# Patient Record
Sex: MALE | Race: ASIAN | NOT HISPANIC OR LATINO | Employment: FULL TIME | URBAN - METROPOLITAN AREA
[De-identification: names, ages, dates, MRNs, and addresses within clinical notes are randomized per-mention and may not be internally consistent; named-entity substitution may affect disease eponyms.]

---

## 2019-02-13 ENCOUNTER — OFFICE VISIT - HEALTHEAST (OUTPATIENT)
Dept: FAMILY MEDICINE | Facility: CLINIC | Age: 44
End: 2019-02-13

## 2019-02-13 ENCOUNTER — COMMUNICATION - HEALTHEAST (OUTPATIENT)
Dept: FAMILY MEDICINE | Facility: CLINIC | Age: 44
End: 2019-02-13

## 2019-02-13 DIAGNOSIS — R03.0 BORDERLINE HIGH BLOOD PRESSURE: ICD-10-CM

## 2019-02-13 DIAGNOSIS — M54.6 ACUTE MIDLINE THORACIC BACK PAIN: ICD-10-CM

## 2019-02-13 ASSESSMENT — MIFFLIN-ST. JEOR: SCORE: 1853

## 2019-02-20 ENCOUNTER — HOSPITAL ENCOUNTER (OUTPATIENT)
Dept: PHYSICAL MEDICINE AND REHAB | Facility: CLINIC | Age: 44
Discharge: HOME | End: 2019-02-20
Attending: FAMILY MEDICINE

## 2019-02-20 DIAGNOSIS — R20.2 PARESTHESIA: ICD-10-CM

## 2019-02-20 DIAGNOSIS — M54.41 ACUTE BILATERAL LOW BACK PAIN WITH BILATERAL SCIATICA: ICD-10-CM

## 2019-02-20 DIAGNOSIS — M54.42 ACUTE BILATERAL LOW BACK PAIN WITH BILATERAL SCIATICA: ICD-10-CM

## 2019-02-22 ENCOUNTER — RECORDS - HEALTHEAST (OUTPATIENT)
Dept: ADMINISTRATIVE | Facility: OTHER | Age: 44
End: 2019-02-22

## 2019-02-22 ENCOUNTER — TRANSFERRED RECORDS (OUTPATIENT)
Dept: HEALTH INFORMATION MANAGEMENT | Facility: CLINIC | Age: 44
End: 2019-02-22

## 2019-02-27 ENCOUNTER — RECORDS - HEALTHEAST (OUTPATIENT)
Dept: ADMINISTRATIVE | Facility: OTHER | Age: 44
End: 2019-02-27

## 2019-02-28 ENCOUNTER — COMMUNICATION - HEALTHEAST (OUTPATIENT)
Dept: ADMINISTRATIVE | Facility: CLINIC | Age: 44
End: 2019-02-28

## 2019-03-05 ENCOUNTER — HOSPITAL ENCOUNTER (OUTPATIENT)
Dept: PHYSICAL MEDICINE AND REHAB | Facility: CLINIC | Age: 44
Discharge: HOME | End: 2019-03-05
Attending: PHYSICIAN ASSISTANT

## 2019-03-05 DIAGNOSIS — M54.42 ACUTE BILATERAL LOW BACK PAIN WITH BILATERAL SCIATICA: ICD-10-CM

## 2019-03-05 DIAGNOSIS — M54.41 ACUTE BILATERAL LOW BACK PAIN WITH BILATERAL SCIATICA: ICD-10-CM

## 2019-03-06 ENCOUNTER — COMMUNICATION - HEALTHEAST (OUTPATIENT)
Dept: PHYSICAL MEDICINE AND REHAB | Facility: CLINIC | Age: 44
End: 2019-03-06

## 2019-03-08 ENCOUNTER — COMMUNICATION - HEALTHEAST (OUTPATIENT)
Dept: PHYSICAL MEDICINE AND REHAB | Facility: CLINIC | Age: 44
End: 2019-03-08

## 2019-03-08 DIAGNOSIS — M54.50 LUMBAR PAIN: ICD-10-CM

## 2019-03-11 ENCOUNTER — HOSPITAL ENCOUNTER (OUTPATIENT)
Dept: PHYSICAL MEDICINE AND REHAB | Facility: CLINIC | Age: 44
Discharge: HOME | End: 2019-03-11
Attending: NURSE PRACTITIONER

## 2019-03-11 DIAGNOSIS — R20.2 PARESTHESIA: ICD-10-CM

## 2019-03-11 DIAGNOSIS — M54.42 ACUTE BILATERAL LOW BACK PAIN WITH BILATERAL SCIATICA: ICD-10-CM

## 2019-03-11 DIAGNOSIS — M54.50 LUMBAR PAIN: ICD-10-CM

## 2019-03-11 DIAGNOSIS — M79.18 MYOFASCIAL PAIN ON RIGHT SIDE: ICD-10-CM

## 2019-03-11 DIAGNOSIS — M54.41 ACUTE BILATERAL LOW BACK PAIN WITH BILATERAL SCIATICA: ICD-10-CM

## 2019-03-26 ENCOUNTER — HOSPITAL ENCOUNTER (OUTPATIENT)
Dept: PHYSICAL MEDICINE AND REHAB | Facility: CLINIC | Age: 44
Discharge: HOME | End: 2019-03-26
Attending: PHYSICIAN ASSISTANT

## 2019-03-26 DIAGNOSIS — M54.16 LUMBAR RADICULAR PAIN: ICD-10-CM

## 2019-03-27 ENCOUNTER — RECORDS - HEALTHEAST (OUTPATIENT)
Dept: RADIOLOGY | Facility: CLINIC | Age: 44
End: 2019-03-27

## 2019-04-09 ENCOUNTER — HOSPITAL ENCOUNTER (OUTPATIENT)
Dept: PHYSICAL MEDICINE AND REHAB | Facility: CLINIC | Age: 44
Discharge: HOME | End: 2019-04-09
Attending: PHYSICIAN ASSISTANT

## 2019-04-09 DIAGNOSIS — M54.16 LUMBAR RADICULAR PAIN: ICD-10-CM

## 2019-04-24 ENCOUNTER — HOSPITAL ENCOUNTER (OUTPATIENT)
Dept: PHYSICAL MEDICINE AND REHAB | Facility: CLINIC | Age: 44
Discharge: HOME | End: 2019-04-24
Attending: PHYSICIAN ASSISTANT

## 2019-04-24 DIAGNOSIS — M54.41 ACUTE BILATERAL LOW BACK PAIN WITH BILATERAL SCIATICA: ICD-10-CM

## 2019-04-24 DIAGNOSIS — M54.16 LUMBAR RADICULAR PAIN: ICD-10-CM

## 2019-04-24 DIAGNOSIS — R20.2 PARESTHESIA: ICD-10-CM

## 2019-04-24 DIAGNOSIS — M51.26 LUMBAR DISC HERNIATION: ICD-10-CM

## 2019-04-24 DIAGNOSIS — M54.2 CERVICALGIA: ICD-10-CM

## 2019-04-24 DIAGNOSIS — M54.42 ACUTE BILATERAL LOW BACK PAIN WITH BILATERAL SCIATICA: ICD-10-CM

## 2019-04-30 ENCOUNTER — HOSPITAL ENCOUNTER (OUTPATIENT)
Dept: PHYSICAL MEDICINE AND REHAB | Facility: CLINIC | Age: 44
Discharge: HOME | End: 2019-04-30
Attending: PHYSICIAN ASSISTANT

## 2019-04-30 DIAGNOSIS — M54.2 CERVICALGIA: ICD-10-CM

## 2019-04-30 DIAGNOSIS — M51.26 LUMBAR DISC HERNIATION: ICD-10-CM

## 2019-04-30 DIAGNOSIS — M53.3 CHRONIC RIGHT SACROILIAC JOINT PAIN: ICD-10-CM

## 2019-04-30 DIAGNOSIS — M54.16 LUMBAR RADICULAR PAIN: ICD-10-CM

## 2019-04-30 DIAGNOSIS — G89.29 CHRONIC RIGHT SACROILIAC JOINT PAIN: ICD-10-CM

## 2019-05-22 ENCOUNTER — AMBULATORY - HEALTHEAST (OUTPATIENT)
Dept: PHYSICAL MEDICINE AND REHAB | Facility: CLINIC | Age: 44
End: 2019-05-22

## 2019-05-22 ENCOUNTER — HOSPITAL ENCOUNTER (OUTPATIENT)
Dept: PHYSICAL MEDICINE AND REHAB | Facility: CLINIC | Age: 44
Discharge: HOME | End: 2019-05-22
Attending: PHYSICIAN ASSISTANT

## 2019-05-22 DIAGNOSIS — G89.29 CHRONIC RIGHT SACROILIAC JOINT PAIN: ICD-10-CM

## 2019-05-22 DIAGNOSIS — M53.3 CHRONIC RIGHT SACROILIAC JOINT PAIN: ICD-10-CM

## 2019-05-22 DIAGNOSIS — M54.16 LUMBAR RADICULAR PAIN: ICD-10-CM

## 2019-05-24 ENCOUNTER — RECORDS - HEALTHEAST (OUTPATIENT)
Dept: ADMINISTRATIVE | Facility: OTHER | Age: 44
End: 2019-05-24

## 2019-05-29 ENCOUNTER — RECORDS - HEALTHEAST (OUTPATIENT)
Dept: ADMINISTRATIVE | Facility: OTHER | Age: 44
End: 2019-05-29

## 2019-06-03 ENCOUNTER — HOSPITAL ENCOUNTER (OUTPATIENT)
Dept: PHYSICAL MEDICINE AND REHAB | Facility: CLINIC | Age: 44
Discharge: HOME | End: 2019-06-03
Attending: PHYSICIAN ASSISTANT

## 2019-06-03 DIAGNOSIS — M51.26 LUMBAR DISC HERNIATION: ICD-10-CM

## 2019-06-03 DIAGNOSIS — M54.16 LUMBAR RADICULAR PAIN: ICD-10-CM

## 2019-06-03 DIAGNOSIS — M53.3 PAIN OF RIGHT SACROILIAC JOINT: ICD-10-CM

## 2019-06-03 DIAGNOSIS — M25.551 HIP PAIN, RIGHT: ICD-10-CM

## 2019-06-04 ENCOUNTER — COMMUNICATION - HEALTHEAST (OUTPATIENT)
Dept: PHYSICAL MEDICINE AND REHAB | Facility: CLINIC | Age: 44
End: 2019-06-04

## 2019-06-07 ENCOUNTER — RECORDS - HEALTHEAST (OUTPATIENT)
Dept: ADMINISTRATIVE | Facility: OTHER | Age: 44
End: 2019-06-07

## 2019-06-10 ENCOUNTER — COMMUNICATION - HEALTHEAST (OUTPATIENT)
Dept: PHYSICAL MEDICINE AND REHAB | Facility: CLINIC | Age: 44
End: 2019-06-10

## 2019-06-10 ENCOUNTER — RECORDS - HEALTHEAST (OUTPATIENT)
Dept: ADMINISTRATIVE | Facility: OTHER | Age: 44
End: 2019-06-10

## 2019-06-10 ENCOUNTER — AMBULATORY - HEALTHEAST (OUTPATIENT)
Dept: PHYSICAL MEDICINE AND REHAB | Facility: CLINIC | Age: 44
End: 2019-06-10

## 2019-06-10 DIAGNOSIS — M53.3 SACROILIAC JOINT PAIN: ICD-10-CM

## 2019-06-12 ENCOUNTER — AMBULATORY - HEALTHEAST (OUTPATIENT)
Dept: LAB | Facility: HOSPITAL | Age: 44
End: 2019-06-12

## 2019-06-12 DIAGNOSIS — M53.3 SACROILIAC JOINT PAIN: ICD-10-CM

## 2019-06-12 LAB
C REACTIVE PROTEIN LHE: 0.1 MG/DL (ref 0–0.8)
ERYTHROCYTE [SEDIMENTATION RATE] IN BLOOD BY WESTERGREN METHOD: 7 MM/HR (ref 0–15)

## 2019-06-18 LAB
B LOCUS: NORMAL
B27TEST METHOD: NORMAL

## 2019-06-20 ENCOUNTER — COMMUNICATION - HEALTHEAST (OUTPATIENT)
Dept: PHYSICAL MEDICINE AND REHAB | Facility: CLINIC | Age: 44
End: 2019-06-20

## 2019-06-24 ENCOUNTER — HOSPITAL ENCOUNTER (OUTPATIENT)
Dept: PHYSICAL MEDICINE AND REHAB | Facility: CLINIC | Age: 44
Discharge: HOME | End: 2019-06-24
Attending: PHYSICIAN ASSISTANT

## 2019-06-24 DIAGNOSIS — M53.3 SACROILIAC JOINT PAIN: ICD-10-CM

## 2019-06-24 DIAGNOSIS — M47.816 LUMBAR FACET ARTHROPATHY: ICD-10-CM

## 2019-06-24 DIAGNOSIS — R20.2 PARESTHESIA: ICD-10-CM

## 2019-06-24 DIAGNOSIS — M54.16 LUMBAR RADICULAR PAIN: ICD-10-CM

## 2019-07-01 ENCOUNTER — COMMUNICATION - HEALTHEAST (OUTPATIENT)
Dept: PHYSICAL MEDICINE AND REHAB | Facility: CLINIC | Age: 44
End: 2019-07-01

## 2019-07-02 ENCOUNTER — COMMUNICATION - HEALTHEAST (OUTPATIENT)
Dept: PHYSICAL MEDICINE AND REHAB | Facility: CLINIC | Age: 44
End: 2019-07-02

## 2019-07-02 ENCOUNTER — HOSPITAL ENCOUNTER (OUTPATIENT)
Dept: PHYSICAL MEDICINE AND REHAB | Facility: CLINIC | Age: 44
Discharge: HOME | End: 2019-07-02
Attending: PHYSICIAN ASSISTANT

## 2019-07-02 DIAGNOSIS — M47.816 LUMBAR FACET ARTHROPATHY: ICD-10-CM

## 2019-07-02 DIAGNOSIS — M53.3 SACROILIAC JOINT PAIN: ICD-10-CM

## 2019-07-02 DIAGNOSIS — M54.16 LUMBAR RADICULAR PAIN: ICD-10-CM

## 2019-07-02 DIAGNOSIS — R32 URINARY INCONTINENCE, UNSPECIFIED TYPE: ICD-10-CM

## 2019-07-08 ENCOUNTER — COMMUNICATION - HEALTHEAST (OUTPATIENT)
Dept: PHYSICAL MEDICINE AND REHAB | Facility: CLINIC | Age: 44
End: 2019-07-08

## 2019-07-08 ENCOUNTER — HOSPITAL ENCOUNTER (OUTPATIENT)
Dept: PHYSICAL MEDICINE AND REHAB | Facility: CLINIC | Age: 44
Discharge: HOME | End: 2019-07-08
Attending: PHYSICIAN ASSISTANT

## 2019-07-08 DIAGNOSIS — M54.16 LUMBAR RADICULAR PAIN: ICD-10-CM

## 2019-07-08 DIAGNOSIS — R20.2 PARESTHESIA: ICD-10-CM

## 2019-07-10 ENCOUNTER — TRANSFERRED RECORDS (OUTPATIENT)
Dept: HEALTH INFORMATION MANAGEMENT | Facility: CLINIC | Age: 44
End: 2019-07-10

## 2019-07-10 ENCOUNTER — HOSPITAL ENCOUNTER (OUTPATIENT)
Dept: PHYSICAL MEDICINE AND REHAB | Facility: CLINIC | Age: 44
Discharge: HOME | End: 2019-07-10
Attending: PHYSICIAN ASSISTANT

## 2019-07-10 DIAGNOSIS — M47.816 LUMBAR FACET ARTHROPATHY: ICD-10-CM

## 2019-07-15 ENCOUNTER — COMMUNICATION - HEALTHEAST (OUTPATIENT)
Dept: PHYSICAL MEDICINE AND REHAB | Facility: CLINIC | Age: 44
End: 2019-07-15

## 2019-07-15 DIAGNOSIS — M47.816 LUMBAR SPONDYLOSIS: ICD-10-CM

## 2019-07-18 ENCOUNTER — COMMUNICATION - HEALTHEAST (OUTPATIENT)
Dept: PHYSICAL MEDICINE AND REHAB | Facility: CLINIC | Age: 44
End: 2019-07-18

## 2019-07-19 ENCOUNTER — OFFICE VISIT - HEALTHEAST (OUTPATIENT)
Dept: FAMILY MEDICINE | Facility: CLINIC | Age: 44
End: 2019-07-19

## 2019-07-19 DIAGNOSIS — M54.16 LUMBAR RADICULOPATHY: Primary | ICD-10-CM

## 2019-07-19 DIAGNOSIS — I10 BENIGN ESSENTIAL HYPERTENSION: ICD-10-CM

## 2019-07-19 DIAGNOSIS — E66.811 OBESITY (BMI 30.0-34.9): ICD-10-CM

## 2019-07-19 DIAGNOSIS — M54.16 LUMBAR RADICULAR PAIN: ICD-10-CM

## 2019-07-19 DIAGNOSIS — J20.9 ACUTE BRONCHITIS, UNSPECIFIED ORGANISM: ICD-10-CM

## 2019-07-19 ASSESSMENT — MIFFLIN-ST. JEOR: SCORE: 1864.3

## 2019-07-22 ENCOUNTER — HOSPITAL ENCOUNTER (OUTPATIENT)
Dept: PHYSICAL MEDICINE AND REHAB | Facility: CLINIC | Age: 44
Discharge: HOME | End: 2019-07-22
Attending: PHYSICIAN ASSISTANT

## 2019-07-22 DIAGNOSIS — M47.816 LUMBAR SPONDYLOSIS: ICD-10-CM

## 2019-07-22 DIAGNOSIS — M47.816 LUMBAR FACET ARTHROPATHY: ICD-10-CM

## 2019-07-22 DIAGNOSIS — M53.3 SACROILIAC JOINT PAIN: ICD-10-CM

## 2019-07-25 ENCOUNTER — ANCILLARY PROCEDURE (OUTPATIENT)
Dept: GENERAL RADIOLOGY | Facility: CLINIC | Age: 44
End: 2019-07-25
Attending: NEUROLOGICAL SURGERY
Payer: OTHER MISCELLANEOUS

## 2019-07-25 ENCOUNTER — OFFICE VISIT (OUTPATIENT)
Dept: NEUROSURGERY | Facility: CLINIC | Age: 44
End: 2019-07-25
Payer: OTHER MISCELLANEOUS

## 2019-07-25 ENCOUNTER — ANCILLARY PROCEDURE (OUTPATIENT)
Dept: GENERAL RADIOLOGY | Facility: CLINIC | Age: 44
End: 2019-07-25
Attending: NEUROLOGICAL SURGERY

## 2019-07-25 VITALS
WEIGHT: 225.7 LBS | HEART RATE: 83 BPM | BODY MASS INDEX: 35.43 KG/M2 | SYSTOLIC BLOOD PRESSURE: 144 MMHG | HEIGHT: 67 IN | OXYGEN SATURATION: 93 % | DIASTOLIC BLOOD PRESSURE: 93 MMHG

## 2019-07-25 DIAGNOSIS — M54.16 LUMBAR RADICULOPATHY: ICD-10-CM

## 2019-07-25 DIAGNOSIS — M53.3 SACROILIAC JOINT PAIN: Primary | ICD-10-CM

## 2019-07-25 RX ORDER — BENZONATATE 200 MG/1
CAPSULE ORAL
Refills: 1 | COMMUNITY
Start: 2019-07-19 | End: 2021-09-23

## 2019-07-25 RX ORDER — ALBUTEROL SULFATE 90 UG/1
2 AEROSOL, METERED RESPIRATORY (INHALATION)
COMMUNITY
Start: 2019-07-19 | End: 2021-09-23

## 2019-07-25 RX ORDER — CYCLOBENZAPRINE HCL 5 MG
5-10 TABLET ORAL
COMMUNITY
Start: 2019-03-08 | End: 2021-09-23

## 2019-07-25 RX ORDER — ACETAMINOPHEN 500 MG
1000 TABLET ORAL
COMMUNITY
Start: 2018-12-09 | End: 2024-07-24

## 2019-07-25 RX ORDER — ONDANSETRON 4 MG/1
4-8 TABLET, ORALLY DISINTEGRATING ORAL
COMMUNITY
Start: 2012-11-15 | End: 2021-09-23

## 2019-07-25 RX ORDER — GABAPENTIN 300 MG/1
1200 CAPSULE ORAL
COMMUNITY
Start: 2019-04-24 | End: 2021-09-23

## 2019-07-25 RX ORDER — HYDROCODONE BITARTRATE AND ACETAMINOPHEN 5; 325 MG/1; MG/1
1-2 TABLET ORAL
COMMUNITY
Start: 2012-11-15 | End: 2021-09-23

## 2019-07-25 ASSESSMENT — ENCOUNTER SYMPTOMS
MUSCLE WEAKNESS: 0
POLYDIPSIA: 1
NECK PAIN: 0
TREMORS: 0
MYALGIAS: 1
FEVER: 0
DYSPNEA ON EXERTION: 0
FATIGUE: 1
HYPOTENSION: 0
DIZZINESS: 0
SPEECH CHANGE: 0
JOINT SWELLING: 0
COUGH: 1
PALPITATIONS: 0
BACK PAIN: 1
WEAKNESS: 1
LIGHT-HEADEDNESS: 1
SINUS CONGESTION: 1
TASTE DISTURBANCE: 0
CHILLS: 0
PARALYSIS: 0
HEMOPTYSIS: 0
ARTHRALGIAS: 1
SEIZURES: 0
SMELL DISTURBANCE: 1
INCREASED ENERGY: 0
WEIGHT GAIN: 0
SHORTNESS OF BREATH: 1
SLEEP DISTURBANCES DUE TO BREATHING: 1
HEADACHES: 1
LOSS OF CONSCIOUSNESS: 0
DISTURBANCES IN COORDINATION: 0
TROUBLE SWALLOWING: 0
SORE THROAT: 0
ORTHOPNEA: 1
STIFFNESS: 0
WHEEZING: 1
HALLUCINATIONS: 0
TINGLING: 1
SNORES LOUDLY: 0
ALTERED TEMPERATURE REGULATION: 0
SPUTUM PRODUCTION: 0
HOARSE VOICE: 0
LEG PAIN: 1
HYPERTENSION: 1
NUMBNESS: 1
POSTURAL DYSPNEA: 1
MUSCLE CRAMPS: 0
DECREASED APPETITE: 1
NECK MASS: 0
EXERCISE INTOLERANCE: 0
NIGHT SWEATS: 0
MEMORY LOSS: 0
WEIGHT LOSS: 1
COUGH DISTURBING SLEEP: 1
POLYPHAGIA: 0
SYNCOPE: 0
SINUS PAIN: 0

## 2019-07-25 ASSESSMENT — PATIENT HEALTH QUESTIONNAIRE - PHQ9
SUM OF ALL RESPONSES TO PHQ QUESTIONS 1-9: 15
SUM OF ALL RESPONSES TO PHQ QUESTIONS 1-9: 15

## 2019-07-25 ASSESSMENT — MIFFLIN-ST. JEOR: SCORE: 1872.4

## 2019-07-25 ASSESSMENT — PAIN SCALES - GENERAL: PAINLEVEL: EXTREME PAIN (8)

## 2019-07-25 NOTE — LETTER
7/25/2019       RE: Dalila Malone  [address]  [place] [address]     Dear Colleague,    Thank you for referring your patient, Dalila Malone, to the [facility] NEUROSURGERY at [facility]. Please see a copy of my visit note below.        Neurosurgery Clinic Note    Patient was mistakenly scheduled for my clinic for sacroiliac joint pain evaluation    On exam, he has positive FFT, PSIS TTP, SRIKANTH, thigh thrust, Gaenslen's  Negative lumbar spine imaging  His EOS xrays are with him leaning on a cane, he has no significant sagittal deformity on angular measurements and no significant findings on lumbar MRI to explain pain  He had prior positive response to SI joint injection at [facility]    I will refer him to Dr. Kevan Cheek of [facility] to discuss further management of SI joint pain.  No charge for this visit.      Radha Valverde MD    [facility] Department of Neurosurgery  Office: [phone]    7/25/2019  1:04 PM            Again, thank you for allowing me to participate in the care of your patient.      Sincerely,    Radha Valverde MD

## 2019-07-25 NOTE — NURSING NOTE
Chief Complaint   Patient presents with     New Patient     UMP NEW RIGHT SACROILIAC JOINT       Kat Gómez, EMT

## 2019-07-25 NOTE — PROGRESS NOTES
Neurosurgery Clinic Note    Patient was mistakenly scheduled for my clinic for sacroiliac joint pain evaluation    On exam, he has positive FFT, PSIS TTP, SRIKANTH, thigh thrust, Gaenslen's  Negative lumbar spine imaging  His EOS xrays are with him leaning on a cane, he has no significant sagittal deformity on angular measurements and no significant findings on lumbar MRI to explain pain  He had prior positive response to SI joint injection at [facility]    I will refer him to Dr. Kevan Cheek of [facility] to discuss further management of SI joint pain.  No charge for this visit.      Radha Valverde MD    [facility] Department of Neurosurgery  Office: [phone]    7/25/2019  1:04 PM

## 2019-07-25 NOTE — LETTER
Date:July 29, 2019      Patient was self referred, no letter generated. Do not send.        [facility] Health Information

## 2019-07-26 ASSESSMENT — PATIENT HEALTH QUESTIONNAIRE - PHQ9: SUM OF ALL RESPONSES TO PHQ QUESTIONS 1-9: 15

## 2019-07-26 NOTE — TELEPHONE ENCOUNTER
"RECORDS RECEIVED FROM: appt layla Helm from Neurosurgery- for SI joint pain. referred by Radha Valverde MD   DATE RECEIVED: Aug 8, 2019    NOTES STATUS DETAILS   OFFICE NOTE from referring provider Internal 7/25/19 Dr. Valverde   OFFICE NOTE from other specialist Care Everywhere  Requested  7/22/19 Yana LEDEZMA    EMG   DISCHARGE SUMMARY from hospital N/A    DISCHARGE REPORT from the ER N/A    OPERATIVE REPORT N/A    MEDICATION LIST Care Everywhere    IMPLANT RECORD/STICKER N/A    LABS     CBC/DIFF N/A    CULTURES N/A    INJECTIONS DONE IN RADIOLOGY Received  7/10/19, 5/22/19, 4/9/19   MRI Received  2/22/19 [facility]   CT SCAN N/A    XRAYS (IMAGES & REPORTS) Received  7/25/19 internal    HE 2/12/19   TUMOR     PATHOLOGY  Slides & report N/A      07/26/19   3:22 PM  Faxed request to  for EMG  Called  film room for images.  3:31 PM called [facility] for MRI  Unclear where injection images are, will call  [facility], referring clinic, on Monday AM. [phone]    08/06/19   9:35 AM  Called spine center. Transferred to [facility]'s larger records department. They state they will put the injection images \"in the queue\" to be transferred to us, but sound doubtful.  9:41 AM called [facility] film room just in case. They \"don't see\" any images.   9:43 AM called spine center. Spoke with RN who can see the injection images in PACS. She will check with the radiology techs and with the referrals team to have the images pushed.   10:04 AM spine center RN called back and they have no way of getting the images to us.  Audeliad Mary for help with images  "

## 2019-07-29 ENCOUNTER — HOSPITAL ENCOUNTER (OUTPATIENT)
Dept: PHYSICAL MEDICINE AND REHAB | Facility: CLINIC | Age: 44
Discharge: HOME | End: 2019-07-29
Attending: PHYSICIAN ASSISTANT

## 2019-07-29 ENCOUNTER — COMMUNICATION - HEALTHEAST (OUTPATIENT)
Dept: PHYSICAL MEDICINE AND REHAB | Facility: CLINIC | Age: 44
End: 2019-07-29

## 2019-07-29 DIAGNOSIS — M54.42 ACUTE BILATERAL LOW BACK PAIN WITH BILATERAL SCIATICA: ICD-10-CM

## 2019-07-29 DIAGNOSIS — M47.816 LUMBAR FACET ARTHROPATHY: ICD-10-CM

## 2019-07-29 DIAGNOSIS — M79.18 MYOFASCIAL PAIN: ICD-10-CM

## 2019-07-29 DIAGNOSIS — M54.41 ACUTE BILATERAL LOW BACK PAIN WITH BILATERAL SCIATICA: ICD-10-CM

## 2019-07-29 DIAGNOSIS — M53.3 SACROILIAC JOINT PAIN: ICD-10-CM

## 2019-07-29 DIAGNOSIS — R20.2 PARESTHESIA: ICD-10-CM

## 2019-07-30 ENCOUNTER — HOSPITAL ENCOUNTER (OUTPATIENT)
Dept: PHYSICAL MEDICINE AND REHAB | Facility: CLINIC | Age: 44
Discharge: HOME | End: 2019-07-30
Attending: PHYSICIAN ASSISTANT

## 2019-07-30 ENCOUNTER — COMMUNICATION - HEALTHEAST (OUTPATIENT)
Dept: PHYSICAL MEDICINE AND REHAB | Facility: CLINIC | Age: 44
End: 2019-07-30

## 2019-07-30 ENCOUNTER — TRANSFERRED RECORDS (OUTPATIENT)
Dept: HEALTH INFORMATION MANAGEMENT | Facility: CLINIC | Age: 44
End: 2019-07-30

## 2019-07-30 DIAGNOSIS — M47.816 LUMBAR SPONDYLOSIS: ICD-10-CM

## 2019-07-31 ENCOUNTER — COMMUNICATION - HEALTHEAST (OUTPATIENT)
Dept: PHYSICAL MEDICINE AND REHAB | Facility: CLINIC | Age: 44
End: 2019-07-31
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
## 2019-07-31 DIAGNOSIS — M54.50 CHRONIC RIGHT-SIDED LOW BACK PAIN WITHOUT SCIATICA: ICD-10-CM

## 2019-07-31 DIAGNOSIS — G89.29 CHRONIC RIGHT-SIDED LOW BACK PAIN WITHOUT SCIATICA: ICD-10-CM

## 2019-08-07 NOTE — PROGRESS NOTES
REASON FOR CONSULTATION: Right sacral iliac joint pain     REFERRING PHYSICIAN: Radha Valverde     PCP:No primary care provider on file.    History of Present Illness:  44/m, accompanied by C, presents with right lower back pain and right buttocks pain radiating to the posterior right thigh. The patient also reports tingling in the right posterolateral thigh. This pain started on 2/11/2019 following a work injury. The patient was working in a [employer] picking up a dock plate chain when he twisted to the right. He felt a pinch in the middle lower back. He was sitting down in his chair approximately 30 minutes later when he had severe pain when he attempted to stand up. He was seen by his PCP the next day and sent to the ED for further management. He had imaging of the upper spine as well as pain management prior to discharge. He was referred to the [facility] were an MRI was performed on 2/22/2019. This showed mild degenerative changes L2-3 and L4-5. He had his first injection following this on 4/9 (epidural) which helped for a few days. A second MRI was performed on 4/25, followed by SI joint injection on 5/22. This helped his pain for a few days. On 7/10 he had a R L3-5 medial branch block which helped for 4 hours. His last injection was 7/30, L4-5, L5-S1 facet injection, helped x1 week. These were done at the [facility] in [place], [place].     He states his back and leg pain have only become worse. He now needs to ambulate with a cane in a bent over position. He is unable to stand up straight due to the pain. The pain is localized to the lower back and the right buttocks. It radiates down the right posterior thigh. It does not go to the left side. He has been taking tylenol, ibuprofen, gabapentin, flexeril and diclofenac for his pain.    He has been working on light duty at the [employer].     Back 75%, Leg 25%,  Right Only  Worse: Getting up in the AM, twisting motion, walking long  "distance  Better: Shifting positions, bending over slightly    Previous treatment:   4/9/2019: Epidural injection L4-L5: helped a few hours  5/22/2019: R SI joint injection: helped for a few days  7/10/2019: R L3-5 medial branch block: helped for 4 hours  7/30/2019: L4-5, L5-S1 facet injection: helped x1 week    He has participated in PT, pool therapy      Oswestry (JO ANN) Questionnaire    OSWESTRY DISABILITY INDEX 7/25/2019   Count 10   Sum 41   Oswestry Score (%) 82          ROS:  A 12-point review of systems was completed and is negative except for otherwise noted above in the history of present illness.    Med Hx:  No past medical history on file.    Surg Hx:  No past surgical history on file.    Allergies:  No Known Allergies    Meds:  Current Outpatient Medications   Medication     acetaminophen (TYLENOL) 500 MG tablet     albuterol (PROVENTIL HFA) 108 (90 Base) MCG/ACT inhaler     aspirin-acetaminophen-caffeine (EXCEDRIN MIGRAINE) 250-250-65 MG tablet     benzonatate (TESSALON) 200 MG capsule     cyclobenzaprine (FLEXERIL) 5 MG tablet     diclofenac (VOLTAREN) 50 MG EC tablet     gabapentin (NEURONTIN) 300 MG capsule     HYDROcodone-acetaminophen (NORCO) 5-325 MG tablet     omeprazole (PRILOSEC) 20 MG DR capsule     ondansetron (ZOFRAN-ODT) 4 MG ODT tab     ranitidine (ZANTAC) 150 MG tablet     No current facility-administered medications for this visit.         Fam Hx:  No family history on file.    P/S Hx:  Social History     Tobacco Use     Smoking status: Current Some Day Smoker     Packs/day: 0.25     Types: Cigarettes     Smokeless tobacco: Never Used     Tobacco comment: six cigs a day   Substance Use Topics     Alcohol use: Not Currently           Physical Exam:  Very pleasant, healthy appearing, alert, oriented x 3, cooperative.  Normal mood and affect.  Not in cardiorespiratory distress.  Ht 1.702 m (5' 7\")   Wt 101.6 kg (224 lb)   BMI 35.08 kg/m    Stands bent over with cane in hand    Antalgic " gait with cane as assistive device. Walks in a hunched over position.  Unable to walk on toes and on heels .  Back: no deformity, no skin lesions or surgical scars.  Localizes pain at lower lumbar spine  Tenderness: (+) midline lower lumbar region, (+) paraspinal lower lumbar region, (+) R PSIS 9-0 L PSIS.  ROM: unable to perform ROM flexion and extension 2/2 pain     Neuro Exam:  Motor: 5/5 strength for all muscle groups in both LE's.  Sensory:  Intact to light touch in both LE's.   Reflexes:  Knee 2+ bilat.  Ankle 2+ bilat. (-) clonus.    Lower Extremity:  Equal leg lengths, full pulses  Full painless passive knee and ankle motion.  SRIKANTH/Shamar's: (+) right, on left aggravates the right sided pain   Right:  Gaenslen's: +  Thigh thrust: (+)  R Hip impingement test causes pain in lateral hip, not in the groin   + R trochanteric pain  + Compression in right   + Sacral thrust     Andrew test R and L not concerning for hip flexion.  It should be noted, however, that patient was in significant discomfort throughout the entire examination procedure.        Imaging:  EOS full body AP lateral standing x-rays from 7/25/2019 were reviewed.  These show very interesting sagittal deformity such that his sagittal vertical axis is significantly displaced forward.  At the same time however he is pelvis is severely anteverted that is likely contributing to the forward SVA translation.  His lumbar spine is actually hyperlordosis and there is a negative for PI-LL mismatch.  His L4-S1 S1 level likewise has good alignment at 41 degrees, ideally 42 degrees.  It is difficult to tell whether the primary deformity here is pelvic anteversion 2' to hip flexion contractures, or lumbar hyperlordosis leading to compensatory pelvic anteversion.  Sagittal measurements:  LL 68  L4-S1 41, ideal 42  PI 64  PI-LL mismatch -4  PT 1  SVA +14.5cm  TPA 14  GT 20  T10-L2 flex 1    EOS lumbar ap-lat x-rays today show similar deformity as above.    MRI  Lumbar spine 4/25/2019:  No significant spinal stenosis.  No disc herniation, no sign of nerve compression.  There are mild degenerative disc changes primarily at L4-5.    MRI Pelvic 6/7/2019 Outside read: Unremarkable SI joints, per radiology read.      Impression:   - Presumptive right sacroiliac joint pain (with > 3 positive SIJ provocation tests), 2' to work injury (DOI: 2/11/2019).  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    Plan:   Had good long discussion with patient and [facility].  Initially I was quite concerned regarding his spinal sagittal malalignment as could be seen on his radiographs.  However after talking to him and evaluating him clinically, I am now beginning to suspect that his deformity is reactive in nature, such that he is hunching forward because of the pain and not as a primary deformity.  Reasons why this suspect so include his account that he used to be able to stand straight prior to this injury, his own admission that he is standing this way because it hurts to stand straight up, and multiple positive sacroiliac joint provocation tests, and no finding of hip flexion contracture on Andrew test.  As such, I do not think that spinal deformity correction surgery is warranted more likely to be beneficial.    Regarding his pain source, his lumbar MRI scans do not show significant injury such as a fracture or instability in his lumbar spine.  I also do not find evidence of nerve compression.  Moreover, his exam is very suggestive of SI joint pathology.  Even though his pelvis MRI was read as having unremarkable sacroiliac joints, it is well-known that SI joint problems do not usually show up on MRI scans.    -- Ablation (facet joints) did seem to help his pain. [facility] may have been considering an ablation (due to previous medial branch block). Patient has an appointment with them next week, first step to inquire if this would be the next best step.  - CT guided diagnostic  right SI joint injection. If positive response, may consider MIS SI joint fusion. Discussed post-operative course which consists of crutches and 50% partial weight bearing x4 weeks. Total recovery about 6 months. If injection is negative and unable to have ablation, would need to reevaluate etiology of pain.      RTC prn.    Isis Oakley MD  Orthopedic Surgery PGY1  Pager: [phone]    Attestation:  I (Dr. Kevan Cheek - Spine Surgeon) have personally evaluated patient with PGY-1 Adin, and agree with findings and plan outlined in the note, which I also edited.  I discussed at length with the patient/family, explained the nature of spinal condition, and formulated workup and/or treatment plan together.  All questions were answered to the best of my ability and to patient's apparent satisfaction.    All questions and concerns were answered to the patient's apparent satisfaction before leaving the clinic.     Total visit time > 45 mins, > 50% counseling and coordination of care.    Respectfully,    Kevan Cheek MD    Orthopaedic Spine Surgery  Dept Orthopaedic Surgery, [facility]  [phone] office, [phone] pager  [email or web address]    Answers for HPI/ROS submitted by the patient on 8/8/2019   General Symptoms: Yes  Skin Symptoms: No  HENT Symptoms: No  EYE SYMPTOMS: No  HEART SYMPTOMS: No  LUNG SYMPTOMS: No  INTESTINAL SYMPTOMS: No  URINARY SYMPTOMS: No  REPRODUCTIVE SYMPTOMS: No  SKELETAL SYMPTOMS: Yes  BLOOD SYMPTOMS: No  NERVOUS SYSTEM SYMPTOMS: Yes  MENTAL HEALTH SYMPTOMS: No  Fever: No  Loss of appetite: Yes  Weight loss: Yes  Weight gain: No  Fatigue: No  Night sweats: No  Chills: No  Increased stress: No  Excessive hunger: No  Excessive thirst: No  Feeling hot or cold when others believe the temperature is normal: No  Loss of height: No  Post-operative complications: No  Hallucinations: No  Change in or Loss of Energy: Yes  Hyperactivity:  No  Confusion: No  Back pain: Yes  Muscle aches: No  Neck pain: No  Swollen joints: No  Joint pain: No  Bone pain: No  Muscle cramps: No  Muscle weakness: Yes  Joint stiffness: No  Bone fracture: No  Trouble with coordination: No  Dizziness or trouble with balance: No  Fainting or black-out spells: No  Memory loss: No  Headache: No  Seizures: No  Speech problems: No  Tingling: Yes  Tremor: No  Weakness: Yes  Difficulty walking: Yes  Paralysis: No  Numbness: Yes

## 2019-08-08 ENCOUNTER — PRE VISIT (OUTPATIENT)
Dept: ORTHOPEDICS | Facility: CLINIC | Age: 44
End: 2019-08-08

## 2019-08-08 ENCOUNTER — ANCILLARY PROCEDURE (OUTPATIENT)
Dept: GENERAL RADIOLOGY | Facility: CLINIC | Age: 44
End: 2019-08-08
Attending: ORTHOPAEDIC SURGERY
Payer: OTHER MISCELLANEOUS

## 2019-08-08 ENCOUNTER — OFFICE VISIT (OUTPATIENT)
Dept: ORTHOPEDICS | Facility: CLINIC | Age: 44
End: 2019-08-08
Attending: NEUROLOGICAL SURGERY
Payer: OTHER MISCELLANEOUS

## 2019-08-08 VITALS — BODY MASS INDEX: 35.16 KG/M2 | WEIGHT: 224 LBS | HEIGHT: 67 IN

## 2019-08-08 DIAGNOSIS — M53.3 SACROILIAC JOINT PAIN: Primary | ICD-10-CM

## 2019-08-08 DIAGNOSIS — M53.3 SACROILIAC JOINT PAIN: ICD-10-CM

## 2019-08-08 ASSESSMENT — ENCOUNTER SYMPTOMS
DECREASED APPETITE: 1
SPEECH CHANGE: 0
MUSCLE CRAMPS: 0
DISTURBANCES IN COORDINATION: 0
PARALYSIS: 0
HALLUCINATIONS: 0
FEVER: 0
WEAKNESS: 1
NECK PAIN: 0
NUMBNESS: 1
JOINT SWELLING: 0
FATIGUE: 0
TREMORS: 0
ARTHRALGIAS: 0
WEIGHT LOSS: 1
MYALGIAS: 0
MEMORY LOSS: 0
INCREASED ENERGY: 1
MUSCLE WEAKNESS: 1
NIGHT SWEATS: 0
CHILLS: 0
DIZZINESS: 0
HEADACHES: 0
POLYPHAGIA: 0
STIFFNESS: 0
SEIZURES: 0
ALTERED TEMPERATURE REGULATION: 0
LOSS OF CONSCIOUSNESS: 0
WEIGHT GAIN: 0
POLYDIPSIA: 0
TINGLING: 1
BACK PAIN: 1

## 2019-08-08 ASSESSMENT — MIFFLIN-ST. JEOR: SCORE: 1864.69

## 2019-08-08 NOTE — LETTER
8/8/2019      RE: Dalila Malone  [address]  [place] [address]       REASON FOR CONSULTATION: Right sacral iliac joint pain     REFERRING PHYSICIAN: Radha Valverde     PCP:No primary care provider on file.    History of Present Illness:  44/m, accompanied by C, presents with right lower back pain and right buttocks pain radiating to the posterior right thigh. The patient also reports tingling in the right posterolateral thigh. This pain started on 2/11/2019 following a work injury. The patient was working in a [employer] picking up a dock plate chain when he twisted to the right. He felt a pinch in the middle lower back. He was sitting down in his chair approximately 30 minutes later when he had severe pain when he attempted to stand up. He was seen by his PCP the next day and sent to the ED for further management. He had imaging of the upper spine as well as pain management prior to discharge. He was referred to the [facility] were an MRI was performed on 2/22/2019. This showed mild degenerative changes L2-3 and L4-5. He had his first injection following this on 4/9 (epidural) which helped for a few days. A second MRI was performed on 4/25, followed by SI joint injection on 5/22. This helped his pain for a few days. On 7/10 he had a R L3-5 medial branch block which helped for 4 hours. His last injection was 7/30, L4-5, L5-S1 facet injection, helped x1 week. These were done at the [facility] in [place], [place].     He states his back and leg pain have only become worse. He now needs to ambulate with a cane in a bent over position. He is unable to stand up straight due to the pain. The pain is localized to the lower back and the right buttocks. It radiates down the right posterior thigh. It does not go to the left side. He has been taking tylenol, ibuprofen, gabapentin, flexeril and diclofenac for his pain.    He has been working on light duty at the [employer].     Back 75%, Leg 25%,  " Right Only  Worse: Getting up in the AM, twisting motion, walking long distance  Better: Shifting positions, bending over slightly    Previous treatment:   4/9/2019: Epidural injection L4-L5: helped a few hours  5/22/2019: R SI joint injection: helped for a few days  7/10/2019: R L3-5 medial branch block: helped for 4 hours  7/30/2019: L4-5, L5-S1 facet injection: helped x1 week    He has participated in PT, pool therapy    Oswestry (JO ANN) Questionnaire    OSWESTRY DISABILITY INDEX 7/25/2019   Count 10   Sum 41   Oswestry Score (%) 82        ROS:  A 12-point review of systems was completed and is negative except for otherwise noted above in the history of present illness.    Med Hx:  No past medical history on file.    Surg Hx:  No past surgical history on file.    Allergies:  No Known Allergies    Meds:  Current Outpatient Medications   Medication     acetaminophen (TYLENOL) 500 MG tablet     albuterol (PROVENTIL HFA) 108 (90 Base) MCG/ACT inhaler     aspirin-acetaminophen-caffeine (EXCEDRIN MIGRAINE) 250-250-65 MG tablet     benzonatate (TESSALON) 200 MG capsule     cyclobenzaprine (FLEXERIL) 5 MG tablet     diclofenac (VOLTAREN) 50 MG EC tablet     gabapentin (NEURONTIN) 300 MG capsule     HYDROcodone-acetaminophen (NORCO) 5-325 MG tablet     omeprazole (PRILOSEC) 20 MG DR capsule     ondansetron (ZOFRAN-ODT) 4 MG ODT tab     ranitidine (ZANTAC) 150 MG tablet     No current facility-administered medications for this visit.         Fam Hx:  No family history on file.    P/S Hx:  Social History     Tobacco Use     Smoking status: Current Some Day Smoker     Packs/day: 0.25     Types: Cigarettes     Smokeless tobacco: Never Used     Tobacco comment: six cigs a day   Substance Use Topics     Alcohol use: Not Currently       Physical Exam:  Very pleasant, healthy appearing, alert, oriented x 3, cooperative.  Normal mood and affect.  Not in cardiorespiratory distress.  Ht 1.702 m (5' 7\")   Wt 101.6 kg (224 lb)   BMI " 35.08 kg/m     Stands bent over with cane in hand    Antalgic gait with cane as assistive device. Walks in a hunched over position.  Unable to walk on toes and on heels .  Back: no deformity, no skin lesions or surgical scars.  Localizes pain at lower lumbar spine  Tenderness: (+) midline lower lumbar region, (+) paraspinal lower lumbar region, (+) R PSIS 9-0 L PSIS.  ROM: unable to perform ROM flexion and extension 2/2 pain     Neuro Exam:  Motor: 5/5 strength for all muscle groups in both LE's.  Sensory:  Intact to light touch in both LE's.   Reflexes:  Knee 2+ bilat.  Ankle 2+ bilat. (-) clonus.    Lower Extremity:  Equal leg lengths, full pulses  Full painless passive knee and ankle motion.  SRIKANTH/Shamar's: (+) right, on left aggravates the right sided pain   Right:  Gaenslen's: +  Thigh thrust: (+)  R Hip impingement test causes pain in lateral hip, not in the groin   + R trochanteric pain  + Compression in right   + Sacral thrust     Andrew test R and L not concerning for hip flexion.  It should be noted, however, that patient was in significant discomfort throughout the entire examination procedure.        Imaging:  EOS full body AP lateral standing x-rays from 7/25/2019 were reviewed.  These show very interesting sagittal deformity such that his sagittal vertical axis is significantly displaced forward.  At the same time however he is pelvis is severely anteverted that is likely contributing to the forward SVA translation.  His lumbar spine is actually hyperlordosis and there is a negative for PI-LL mismatch.  His L4-S1 S1 level likewise has good alignment at 41 degrees, ideally 42 degrees.  It is difficult to tell whether the primary deformity here is pelvic anteversion 2' to hip flexion contractures, or lumbar hyperlordosis leading to compensatory pelvic anteversion.  Sagittal measurements:  LL 68  L4-S1 41, ideal 42  PI 64  PI-LL mismatch -4  PT 1  SVA +14.5cm  TPA 14  GT 20  T10-L2 flex 1    EOS lumbar  ap-lat x-rays today show similar deformity as above.    MRI Lumbar spine 4/25/2019:  No significant spinal stenosis.  No disc herniation, no sign of nerve compression.  There are mild degenerative disc changes primarily at L4-5.    MRI Pelvic 6/7/2019 Outside read: Unremarkable SI joints, per radiology read.    Impression:   - Presumptive right sacroiliac joint pain (with > 3 positive SIJ provocation tests), 2' to work injury (DOI: 2/11/2019).  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    Plan:   Had good long discussion with patient and [facility].  Initially I was quite concerned regarding his spinal sagittal malalignment as could be seen on his radiographs.  However after talking to him and evaluating him clinically, I am now beginning to suspect that his deformity is reactive in nature, such that he is hunching forward because of the pain and not as a primary deformity.  Reasons why this suspect so include his account that he used to be able to stand straight prior to this injury, his own admission that he is standing this way because it hurts to stand straight up, and multiple positive sacroiliac joint provocation tests, and no finding of hip flexion contracture on Andrew test.  As such, I do not think that spinal deformity correction surgery is warranted more likely to be beneficial.    Regarding his pain source, his lumbar MRI scans do not show significant injury such as a fracture or instability in his lumbar spine.  I also do not find evidence of nerve compression.  Moreover, his exam is very suggestive of SI joint pathology.  Even though his pelvis MRI was read as having unremarkable sacroiliac joints, it is well-known that SI joint problems do not usually show up on MRI scans.    -- Ablation (facet joints) did seem to help his pain. [facility] may have been considering an ablation (due to previous medial branch block). Patient has an appointment with them next week, first step to inquire if  this would be the next best step.  - CT guided diagnostic right SI joint injection. If positive response, may consider MIS SI joint fusion. Discussed post-operative course which consists of crutches and 50% partial weight bearing x4 weeks. Total recovery about 6 months. If injection is negative and unable to have ablation, would need to reevaluate etiology of pain.      RTC prn.    Isis Oakley MD  Orthopedic Surgery PGY1  Pager: [phone]    Attestation:  I (Dr. Kevan Cheek - Spine Surgeon) have personally evaluated patient with PGY-1 Adin, and agree with findings and plan outlined in the note, which I also edited.  I discussed at length with the patient/family, explained the nature of spinal condition, and formulated workup and/or treatment plan together.  All questions were answered to the best of my ability and to patient's apparent satisfaction.    All questions and concerns were answered to the patient's apparent satisfaction before leaving the clinic.     Total visit time > 45 mins, > 50% counseling and coordination of care.    Respectfully,    Kevan Cheek MD    Orthopaedic Spine Surgery  Dept Orthopaedic Surgery, [facility]  [phone] office, [phone] pager  [email or web address]

## 2019-08-08 NOTE — LETTER
8/8/2019       RE: Dalila Malone  [address]  [place] [address]     Dear Colleague,    Thank you for referring your patient, Dalila Malone, to the [facility]. Please see a copy of my visit note below.    REASON FOR CONSULTATION: Right sacral iliac joint pain     REFERRING PHYSICIAN: Radha Valverde     PCP:No primary care provider on file.    History of Present Illness:  44/m, accompanied by C, presents with right lower back pain and right buttocks pain radiating to the posterior right thigh. The patient also reports tingling in the right posterolateral thigh. This pain started on 2/11/2019 following a work injury. 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 He felt a pinch in the middle lower back. He was sitting down in his chair approximately 30 minutes later when he had severe pain when he attempted to stand up. He was seen by his PCP the next day and sent to the ED for further management. He had imaging of the upper spine as well as pain management prior to discharge. He was referred to the [facility] were an MRI was performed on 2/22/2019. This showed mild degenerative changes L2-3 and L4-5. He had his first injection following this on 4/9 (epidural) which helped for a few days. A second MRI was performed on 4/25, followed by SI joint injection on 5/22. This helped his pain for a few days. On 7/10 he had a R L3-5 medial branch block which helped for 4 hours. His last injection was 7/30, L4-5, L5-S1 facet injection, helped x1 week. These were done at the [facility] in [place], [place].     He states his back and leg pain have only become worse. He now needs to ambulate with a cane in a bent over position. He is unable to stand up straight due to the pain. The pain is localized to the lower back and the right buttocks. It radiates down the right posterior thigh. It  does not go to the left side. He has been taking tylenol, ibuprofen, gabapentin, flexeril and diclofenac for his pain.    He has been working on light duty at the [employer].     Back 75%, Leg 25%,  Right Only  Worse: Getting up in the AM, twisting motion, walking long distance  Better: Shifting positions, bending over slightly    Previous treatment:   4/9/2019: Epidural injection L4-L5: helped a few hours  5/22/2019: R SI joint injection: helped for a few days  7/10/2019: R L3-5 medial branch block: helped for 4 hours  7/30/2019: L4-5, L5-S1 facet injection: helped x1 week    He has participated in PT, pool therapy    Oswestry (JO ANN) Questionnaire    OSWESTRY DISABILITY INDEX 7/25/2019   Count 10   Sum 41   Oswestry Score (%) 82        ROS:  A 12-point review of systems was completed and is negative except for otherwise noted above in the history of present illness.    Med Hx:  No past medical history on file.    Surg Hx:  No past surgical history on file.    Allergies:  No Known Allergies    Meds:  Current Outpatient Medications   Medication     acetaminophen (TYLENOL) 500 MG tablet     albuterol (PROVENTIL HFA) 108 (90 Base) MCG/ACT inhaler     aspirin-acetaminophen-caffeine (EXCEDRIN MIGRAINE) 250-250-65 MG tablet     benzonatate (TESSALON) 200 MG capsule     cyclobenzaprine (FLEXERIL) 5 MG tablet     diclofenac (VOLTAREN) 50 MG EC tablet     gabapentin (NEURONTIN) 300 MG capsule     HYDROcodone-acetaminophen (NORCO) 5-325 MG tablet     omeprazole (PRILOSEC) 20 MG DR capsule     ondansetron (ZOFRAN-ODT) 4 MG ODT tab     ranitidine (ZANTAC) 150 MG tablet     No current facility-administered medications for this visit.         Fam Hx:  No family history on file.    P/S Hx:  Social History     Tobacco Use     Smoking status: Current Some Day Smoker     Packs/day: 0.25     Types: Cigarettes     Smokeless tobacco: Never Used     Tobacco comment: six cigs a day   Substance Use Topics     Alcohol use: Not Currently  "      Physical Exam:  Very pleasant, healthy appearing, alert, oriented x 3, cooperative.  Normal mood and affect.  Not in cardiorespiratory distress.  Ht 1.702 m (5' 7\")   Wt 101.6 kg (224 lb)   BMI 35.08 kg/m     Stands bent over with cane in hand    Antalgic gait with cane as assistive device. Walks in a hunched over position.  Unable to walk on toes and on heels .  Back: no deformity, no skin lesions or surgical scars.  Localizes pain at lower lumbar spine  Tenderness: (+) midline lower lumbar region, (+) paraspinal lower lumbar region, (+) R PSIS 9-0 L PSIS.  ROM: unable to perform ROM flexion and extension 2/2 pain     Neuro Exam:  Motor: 5/5 strength for all muscle groups in both LE's.  Sensory:  Intact to light touch in both LE's.   Reflexes:  Knee 2+ bilat.  Ankle 2+ bilat. (-) clonus.    Lower Extremity:  Equal leg lengths, full pulses  Full painless passive knee and ankle motion.  SRIKANTH/Shamar's: (+) right, on left aggravates the right sided pain   Right:  Gaenslen's: +  Thigh thrust: (+)  R Hip impingement test causes pain in lateral hip, not in the groin   + R trochanteric pain  + Compression in right   + Sacral thrust     Andrew test R and L not concerning for hip flexion.  It should be noted, however, that patient was in significant discomfort throughout the entire examination procedure.        Imaging:  EOS full body AP lateral standing x-rays from 7/25/2019 were reviewed.  These show very interesting sagittal deformity such that his sagittal vertical axis is significantly displaced forward.  At the same time however he is pelvis is severely anteverted that is likely contributing to the forward SVA translation.  His lumbar spine is actually hyperlordosis and there is a negative for PI-LL mismatch.  His L4-S1 S1 level likewise has good alignment at 41 degrees, ideally 42 degrees.  It is difficult to tell whether the primary deformity here is pelvic anteversion 2' to hip flexion contractures, or " lumbar hyperlordosis leading to compensatory pelvic anteversion.  Sagittal measurements:  LL 68  L4-S1 41, ideal 42  PI 64  PI-LL mismatch -4  PT 1  SVA +14.5cm  TPA 14  GT 20  T10-L2 flex 1    EOS lumbar ap-lat x-rays today show similar deformity as above.    MRI Lumbar spine 4/25/2019:  No significant spinal stenosis.  No disc herniation, no sign of nerve compression.  There are mild degenerative disc changes primarily at L4-5.    MRI Pelvic 6/7/2019 Outside read: Unremarkable SI joints, per radiology read.      Impression:   - Presumptive right sacroiliac joint pain (with > 3 positive SIJ provocation tests), 2' to work injury (DOI: 2/11/2019).  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    Plan:   Had good long discussion with patient and C.  Initially I was quite concerned regarding his spinal sagittal malalignment as could be seen on his radiographs.  However after talking to him and evaluating him clinically, I am now beginning to suspect that his deformity is reactive in nature, such that he is hunching forward because of the pain and not as a primary deformity.  Reasons why this suspect so include his account that he used to be able to stand straight prior to this injury, his own admission that he is standing this way because it hurts to stand straight up, and multiple positive sacroiliac joint provocation tests, and no finding of hip flexion contracture on Andrew test.  As such, I do not think that spinal deformity correction surgery is warranted more likely to be beneficial.    Regarding his pain source, his lumbar MRI scans do not show significant injury such as a fracture or instability in his lumbar spine.  I also do not find evidence of nerve compression.  Moreover, his exam is very suggestive of SI joint pathology.  Even though his pelvis MRI was read as having unremarkable sacroiliac joints, it is well-known that SI joint problems do not usually show up on MRI scans.    -- Ablation (facet  joints) did seem to help his pain. [facility] may have been considering an ablation (due to previous medial branch block). Patient has an appointment with them next week, first step to inquire if this would be the next best step.  - CT guided diagnostic right SI joint injection. If positive response, may consider MIS SI joint fusion. Discussed post-operative course which consists of crutches and 50% partial weight bearing x4 weeks. Total recovery about 6 months. If injection is negative and unable to have ablation, would need to reevaluate etiology of pain.      RTC prn.    Isis Oakley MD  Orthopedic Surgery PGY1  Pager: [phone]    Attestation:  I (Dr. Kevan Cheek - Spine Surgeon) have personally evaluated patient with PGY-1 Adin, and agree with findings and plan outlined in the note, which I also edited.  I discussed at length with the patient/family, explained the nature of spinal condition, and formulated workup and/or treatment plan together.  All questions were answered to the best of my ability and to patient's apparent satisfaction.    All questions and concerns were answered to the patient's apparent satisfaction before leaving the clinic.     Total visit time > 45 mins, > 50% counseling and coordination of care.    Respectfully,    Kevan Cheek MD    Orthopaedic Spine Surgery  Dept Orthopaedic Surgery, [facility]  [phone] office, [phone] pager  [email or web address]

## 2019-08-08 NOTE — NURSING NOTE
"Reason For Visit:   Chief Complaint   Patient presents with     Consult     W/C Low back pain        Primary MD: Ayah Hayes      ? No  Occupation Desk work/ usually works in the [employer]  Currently working? Yes.  Work status?  Part-time.  Date of injury: 2/11/19  Type of injury: WC.  Date of surgery: 2001  Type of surgery L5-S1 Laminectomy  Smoker: Yes    Ht 1.702 m (5' 7\")   Wt 101.6 kg (224 lb)   BMI 35.08 kg/m      Pain Assessment  Patient Currently in Pain: Yes  0-10 Pain Scale: 8  Primary Pain Location: Back  Pain Descriptors: Discomfort    Oswestry (JO ANN) Questionnaire    OSWESTRY DISABILITY INDEX 7/25/2019   Count 10   Sum 41   Oswestry Score (%) 82          Visual Analog Pain Scale  Back Pain Scale 0-10: 7.5  Right leg pain: 0  Left leg pain: 0    Promis 10 Assessment    PROMIS 10 8/8/2019   In general, would you say your health is: Fair   In general, would you say your quality of life is: Good   In general, how would you rate your physical health? Very good   In general, how would you rate your mental health, including your mood and your ability to think? Very good   In general, how would you rate your satisfaction with your social activities and relationships? Fair   In general, please rate how well you carry out your usual social activities and roles Poor   To what extent are you able to carry out your everyday physical activities such as walking, climbing stairs, carrying groceries, or moving a chair? A little   How often have you been bothered by emotional problems such as feeling anxious, depressed or irritable? Sometimes   How would you rate your fatigue on average? Severe   How would you rate your pain on average?   0 = No Pain  to  10 = Worst Imaginable Pain 7   In general, would you say your health is: 2   In general, would you say your quality of life is: 3   In general, how would you rate your physical health? 4   In general, how would you rate your mental health, including your " mood and your ability to think? 4   In general, how would you rate your satisfaction with your social activities and relationships? 2   In general, please rate how well you carry out your usual social activities and roles. (This includes activities at home, at work and in your community, and responsibilities as a parent, child, spouse, employee, friend, etc.) 1   To what extent are you able to carry out your everyday physical activities such as walking, climbing stairs, carrying groceries, or moving a chair? 2   In the past 7 days, how often have you been bothered by emotional problems such as feeling anxious, depressed, or irritable? 3   In the past 7 days, how would you rate your fatigue on average? 4   In the past 7 days, how would you rate your pain on average, where 0 means no pain, and 10 means worst imaginable pain? 7   Global Mental Health Score 12   Global Physical Health Score 10   PROMIS TOTAL - SUBSCORES 22   Some recent data might be hidden                Soraya Zarate LPN

## 2019-08-14 ENCOUNTER — OFFICE VISIT - HEALTHEAST (OUTPATIENT)
Dept: OCCUPATIONAL THERAPY | Facility: REHABILITATION | Age: 44
End: 2019-08-14

## 2019-08-14 ENCOUNTER — HOSPITAL ENCOUNTER (OUTPATIENT)
Dept: PHYSICAL MEDICINE AND REHAB | Facility: CLINIC | Age: 44
Discharge: HOME | End: 2019-08-14
Attending: PHYSICIAN ASSISTANT

## 2019-08-14 DIAGNOSIS — M47.816 LUMBAR FACET ARTHROPATHY: ICD-10-CM
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
## 2019-08-14 DIAGNOSIS — Z78.9 IMPAIRED INSTRUMENTAL ACTIVITIES OF DAILY LIVING (IADL): ICD-10-CM

## 2019-08-14 DIAGNOSIS — M54.50 CHRONIC RIGHT-SIDED LOW BACK PAIN WITHOUT SCIATICA: ICD-10-CM

## 2019-08-14 DIAGNOSIS — Z78.9 DECREASED ACTIVITIES OF DAILY LIVING (ADL): ICD-10-CM

## 2019-08-14 DIAGNOSIS — G89.29 CHRONIC RIGHT-SIDED LOW BACK PAIN WITHOUT SCIATICA: ICD-10-CM

## 2019-08-14 DIAGNOSIS — M53.3 SACROILIAC JOINT PAIN: ICD-10-CM

## 2019-08-22 ENCOUNTER — COMMUNICATION - HEALTHEAST (OUTPATIENT)
Dept: PHYSICAL MEDICINE AND REHAB | Facility: CLINIC | Age: 44
End: 2019-08-22

## 2019-08-26 ENCOUNTER — COMMUNICATION - HEALTHEAST (OUTPATIENT)
Dept: PHYSICAL MEDICINE AND REHAB | Facility: CLINIC | Age: 44
End: 2019-08-26

## 2019-08-27 ENCOUNTER — HOSPITAL ENCOUNTER (OUTPATIENT)
Dept: PHYSICAL MEDICINE AND REHAB | Facility: CLINIC | Age: 44
Discharge: HOME | End: 2019-08-27
Attending: PHYSICIAN ASSISTANT

## 2019-08-27 DIAGNOSIS — M54.16 LUMBAR RADICULAR PAIN: ICD-10-CM

## 2019-08-27 DIAGNOSIS — M54.41 CHRONIC RIGHT-SIDED LOW BACK PAIN WITH RIGHT-SIDED SCIATICA: ICD-10-CM

## 2019-08-27 DIAGNOSIS — G89.29 CHRONIC RIGHT-SIDED LOW BACK PAIN WITH RIGHT-SIDED SCIATICA: ICD-10-CM

## 2019-09-04 ENCOUNTER — OFFICE VISIT - HEALTHEAST (OUTPATIENT)
Dept: OCCUPATIONAL THERAPY | Facility: REHABILITATION | Age: 44
End: 2019-09-04

## 2019-09-04 DIAGNOSIS — Z78.9 DECREASED ACTIVITIES OF DAILY LIVING (ADL): ICD-10-CM

## 2019-09-04 DIAGNOSIS — G89.29 CHRONIC RIGHT-SIDED LOW BACK PAIN WITHOUT SCIATICA: ICD-10-CM

## 2019-09-04 DIAGNOSIS — M54.50 CHRONIC RIGHT-SIDED LOW BACK PAIN WITHOUT SCIATICA: ICD-10-CM

## 2019-09-04 DIAGNOSIS — Z78.9 IMPAIRED INSTRUMENTAL ACTIVITIES OF DAILY LIVING (IADL): ICD-10-CM

## 2019-09-11 ENCOUNTER — OFFICE VISIT - HEALTHEAST (OUTPATIENT)
Dept: OCCUPATIONAL THERAPY | Facility: REHABILITATION | Age: 44
End: 2019-09-11

## 2019-09-11 ENCOUNTER — HOSPITAL ENCOUNTER (OUTPATIENT)
Dept: PHYSICAL MEDICINE AND REHAB | Facility: CLINIC | Age: 44
Discharge: HOME | End: 2019-09-11
Attending: PHYSICIAN ASSISTANT
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
## 2019-09-11 DIAGNOSIS — Z78.9 IMPAIRED INSTRUMENTAL ACTIVITIES OF DAILY LIVING (IADL): ICD-10-CM

## 2019-09-11 DIAGNOSIS — G89.29 CHRONIC RIGHT-SIDED LOW BACK PAIN WITHOUT SCIATICA: ICD-10-CM

## 2019-09-11 DIAGNOSIS — Z78.9 DECREASED ACTIVITIES OF DAILY LIVING (ADL): ICD-10-CM

## 2019-09-11 DIAGNOSIS — M54.50 CHRONIC RIGHT-SIDED LOW BACK PAIN WITHOUT SCIATICA: ICD-10-CM

## 2019-10-02 ENCOUNTER — OFFICE VISIT - HEALTHEAST (OUTPATIENT)
Dept: OCCUPATIONAL THERAPY | Facility: REHABILITATION | Age: 44
End: 2019-10-02

## 2019-10-02 DIAGNOSIS — Z78.9 IMPAIRED INSTRUMENTAL ACTIVITIES OF DAILY LIVING (IADL): ICD-10-CM

## 2019-10-02 DIAGNOSIS — Z78.9 DECREASED ACTIVITIES OF DAILY LIVING (ADL): ICD-10-CM

## 2019-10-02 DIAGNOSIS — M54.50 CHRONIC RIGHT-SIDED LOW BACK PAIN WITHOUT SCIATICA: ICD-10-CM

## 2019-10-02 DIAGNOSIS — G89.29 CHRONIC RIGHT-SIDED LOW BACK PAIN WITHOUT SCIATICA: ICD-10-CM

## 2019-10-07 ENCOUNTER — OFFICE VISIT - HEALTHEAST (OUTPATIENT)
Dept: PHYSICAL THERAPY | Facility: CLINIC | Age: 44
End: 2019-10-07

## 2019-10-07 DIAGNOSIS — M53.3 SACROILIAC JOINT PAIN: ICD-10-CM

## 2019-10-07 DIAGNOSIS — G89.29 CHRONIC RIGHT-SIDED LOW BACK PAIN WITHOUT SCIATICA: ICD-10-CM

## 2019-10-07 DIAGNOSIS — M54.50 CHRONIC RIGHT-SIDED LOW BACK PAIN WITHOUT SCIATICA: ICD-10-CM

## 2019-10-09 ENCOUNTER — OFFICE VISIT - HEALTHEAST (OUTPATIENT)
Dept: PHYSICAL THERAPY | Facility: CLINIC | Age: 44
End: 2019-10-09

## 2019-10-09 ENCOUNTER — COMMUNICATION - HEALTHEAST (OUTPATIENT)
Dept: FAMILY MEDICINE | Facility: CLINIC | Age: 44
End: 2019-10-09

## 2019-10-09 DIAGNOSIS — M54.50 CHRONIC RIGHT-SIDED LOW BACK PAIN WITHOUT SCIATICA: ICD-10-CM

## 2019-10-09 DIAGNOSIS — G89.29 CHRONIC RIGHT-SIDED LOW BACK PAIN WITHOUT SCIATICA: ICD-10-CM

## 2019-10-09 DIAGNOSIS — M53.3 SACROILIAC JOINT PAIN: ICD-10-CM

## 2019-10-11 ENCOUNTER — OFFICE VISIT - HEALTHEAST (OUTPATIENT)
Dept: PHYSICAL THERAPY | Facility: CLINIC | Age: 44
End: 2019-10-11

## 2019-10-11 DIAGNOSIS — G89.29 CHRONIC RIGHT-SIDED LOW BACK PAIN WITHOUT SCIATICA: ICD-10-CM

## 2019-10-11 DIAGNOSIS — M54.50 CHRONIC RIGHT-SIDED LOW BACK PAIN WITHOUT SCIATICA: ICD-10-CM

## 2019-10-11 DIAGNOSIS — M53.3 SACROILIAC JOINT PAIN: ICD-10-CM

## 2019-10-17 ENCOUNTER — HOSPITAL ENCOUNTER (OUTPATIENT)
Dept: PHYSICAL MEDICINE AND REHAB | Facility: CLINIC | Age: 44
Discharge: HOME | End: 2019-10-17
Attending: PHYSICIAN ASSISTANT

## 2019-10-17 DIAGNOSIS — M54.50 CHRONIC RIGHT-SIDED LOW BACK PAIN WITHOUT SCIATICA: ICD-10-CM

## 2019-10-17 DIAGNOSIS — M53.3 SACROILIAC JOINT PAIN: ICD-10-CM

## 2019-10-17 DIAGNOSIS — G89.29 CHRONIC RIGHT-SIDED LOW BACK PAIN WITHOUT SCIATICA: ICD-10-CM
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
## 2019-10-17 DIAGNOSIS — M47.816 LUMBAR FACET ARTHROPATHY: ICD-10-CM

## 2019-10-17 ASSESSMENT — MIFFLIN-ST. JEOR: SCORE: 1868.84

## 2019-10-24 ENCOUNTER — COMMUNICATION - HEALTHEAST (OUTPATIENT)
Dept: PHYSICAL THERAPY | Facility: CLINIC | Age: 44
End: 2019-10-24

## 2019-11-01 ENCOUNTER — OFFICE VISIT - HEALTHEAST (OUTPATIENT)
Dept: PHYSICAL THERAPY | Facility: CLINIC | Age: 44
End: 2019-11-01

## 2019-11-01 DIAGNOSIS — G89.29 CHRONIC RIGHT-SIDED LOW BACK PAIN WITHOUT SCIATICA: ICD-10-CM

## 2019-11-01 DIAGNOSIS — M54.50 CHRONIC RIGHT-SIDED LOW BACK PAIN WITHOUT SCIATICA: ICD-10-CM

## 2019-11-01 DIAGNOSIS — M53.3 SACROILIAC JOINT PAIN: ICD-10-CM

## 2019-11-08 ENCOUNTER — OFFICE VISIT - HEALTHEAST (OUTPATIENT)
Dept: PHYSICAL THERAPY | Facility: CLINIC | Age: 44
End: 2019-11-08

## 2019-11-08 DIAGNOSIS — M53.3 SACROILIAC JOINT PAIN: ICD-10-CM

## 2019-11-08 DIAGNOSIS — M54.50 CHRONIC RIGHT-SIDED LOW BACK PAIN WITHOUT SCIATICA: ICD-10-CM

## 2019-11-08 DIAGNOSIS — G89.29 CHRONIC RIGHT-SIDED LOW BACK PAIN WITHOUT SCIATICA: ICD-10-CM

## 2019-11-18 ENCOUNTER — HOSPITAL ENCOUNTER (OUTPATIENT)
Dept: PHYSICAL MEDICINE AND REHAB | Facility: CLINIC | Age: 44
Discharge: HOME | End: 2019-11-18
Attending: PHYSICIAN ASSISTANT
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
## 2019-11-18 DIAGNOSIS — R20.2 PARESTHESIA: ICD-10-CM

## 2019-11-18 DIAGNOSIS — G89.29 CHRONIC RIGHT-SIDED LOW BACK PAIN WITHOUT SCIATICA: ICD-10-CM

## 2019-11-18 DIAGNOSIS — M54.16 LUMBAR RADICULAR PAIN: ICD-10-CM

## 2019-11-18 DIAGNOSIS — M53.3 SACROILIAC JOINT PAIN: ICD-10-CM

## 2019-11-18 DIAGNOSIS — M54.41 ACUTE BILATERAL LOW BACK PAIN WITH BILATERAL SCIATICA: ICD-10-CM

## 2019-11-18 DIAGNOSIS — M54.42 ACUTE BILATERAL LOW BACK PAIN WITH BILATERAL SCIATICA: ICD-10-CM

## 2019-11-18 DIAGNOSIS — M47.816 LUMBAR FACET ARTHROPATHY: ICD-10-CM

## 2019-11-18 DIAGNOSIS — M54.50 CHRONIC RIGHT-SIDED LOW BACK PAIN WITHOUT SCIATICA: ICD-10-CM

## 2019-11-18 ASSESSMENT — MIFFLIN-ST. JEOR: SCORE: 1877

## 2019-11-20 ENCOUNTER — COMMUNICATION - HEALTHEAST (OUTPATIENT)
Dept: PHYSICAL MEDICINE AND REHAB | Facility: CLINIC | Age: 44
End: 2019-11-20

## 2019-11-25 ENCOUNTER — OFFICE VISIT - HEALTHEAST (OUTPATIENT)
Dept: PHYSICAL THERAPY | Facility: CLINIC | Age: 44
End: 2019-11-25

## 2019-11-25 DIAGNOSIS — M53.3 SACROILIAC JOINT PAIN: ICD-10-CM

## 2019-11-25 DIAGNOSIS — M54.50 CHRONIC RIGHT-SIDED LOW BACK PAIN WITHOUT SCIATICA: ICD-10-CM

## 2019-11-25 DIAGNOSIS — G89.29 CHRONIC RIGHT-SIDED LOW BACK PAIN WITHOUT SCIATICA: ICD-10-CM

## 2019-12-06 ENCOUNTER — OFFICE VISIT - HEALTHEAST (OUTPATIENT)
Dept: PHYSICAL THERAPY | Facility: CLINIC | Age: 44
End: 2019-12-06

## 2019-12-06 DIAGNOSIS — M53.3 SACROILIAC JOINT PAIN: ICD-10-CM

## 2019-12-06 DIAGNOSIS — M54.50 CHRONIC RIGHT-SIDED LOW BACK PAIN WITHOUT SCIATICA: ICD-10-CM

## 2019-12-06 DIAGNOSIS — G89.29 CHRONIC RIGHT-SIDED LOW BACK PAIN WITHOUT SCIATICA: ICD-10-CM

## 2019-12-17 ENCOUNTER — HOSPITAL ENCOUNTER (OUTPATIENT)
Dept: PHYSICAL MEDICINE AND REHAB | Facility: CLINIC | Age: 44
Discharge: HOME | End: 2019-12-17
Attending: PHYSICIAN ASSISTANT

## 2019-12-17 DIAGNOSIS — F41.9 ANXIETY: ICD-10-CM

## 2019-12-17 DIAGNOSIS — M47.816 LUMBAR FACET ARTHROPATHY: ICD-10-CM

## 2019-12-18 ENCOUNTER — OFFICE VISIT - HEALTHEAST (OUTPATIENT)
Dept: OCCUPATIONAL THERAPY | Facility: REHABILITATION | Age: 44
End: 2019-12-18

## 2019-12-18 DIAGNOSIS — G89.29 CHRONIC RIGHT-SIDED LOW BACK PAIN WITHOUT SCIATICA: ICD-10-CM

## 2019-12-18 DIAGNOSIS — M54.50 CHRONIC RIGHT-SIDED LOW BACK PAIN WITHOUT SCIATICA: ICD-10-CM

## 2019-12-18 DIAGNOSIS — Z78.9 DECREASED ACTIVITIES OF DAILY LIVING (ADL): ICD-10-CM

## 2019-12-18 DIAGNOSIS — Z78.9 IMPAIRED INSTRUMENTAL ACTIVITIES OF DAILY LIVING (IADL): ICD-10-CM

## 2019-12-22 ENCOUNTER — COMMUNICATION - HEALTHEAST (OUTPATIENT)
Dept: PHYSICAL MEDICINE AND REHAB | Facility: CLINIC | Age: 44
End: 2019-12-22

## 2019-12-23 ENCOUNTER — HOSPITAL ENCOUNTER (OUTPATIENT)
Dept: PHYSICAL MEDICINE AND REHAB | Facility: CLINIC | Age: 44
Discharge: HOME | End: 2019-12-23
Attending: NURSE PRACTITIONER

## 2019-12-23 DIAGNOSIS — M54.50 LUMBAR PAIN: ICD-10-CM

## 2019-12-23 DIAGNOSIS — G89.29 CHRONIC RIGHT-SIDED LOW BACK PAIN WITHOUT SCIATICA: ICD-10-CM
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
## 2019-12-23 DIAGNOSIS — M47.816 LUMBAR FACET ARTHROPATHY: ICD-10-CM

## 2019-12-23 DIAGNOSIS — M54.50 CHRONIC RIGHT-SIDED LOW BACK PAIN WITHOUT SCIATICA: ICD-10-CM

## 2019-12-23 DIAGNOSIS — M53.3 SACROILIAC JOINT PAIN: ICD-10-CM

## 2019-12-23 ASSESSMENT — MIFFLIN-ST. JEOR: SCORE: 1873.38

## 2019-12-27 ENCOUNTER — COMMUNICATION - HEALTHEAST (OUTPATIENT)
Dept: PHYSICAL MEDICINE AND REHAB | Facility: CLINIC | Age: 44
End: 2019-12-27

## 2019-12-29 ENCOUNTER — COMMUNICATION - HEALTHEAST (OUTPATIENT)
Dept: PHYSICAL MEDICINE AND REHAB | Facility: CLINIC | Age: 44
End: 2019-12-29

## 2019-12-30 ENCOUNTER — COMMUNICATION - HEALTHEAST (OUTPATIENT)
Dept: PHYSICAL THERAPY | Facility: CLINIC | Age: 44
End: 2019-12-30

## 2019-12-31 ENCOUNTER — HOSPITAL ENCOUNTER (OUTPATIENT)
Dept: PHYSICAL MEDICINE AND REHAB | Facility: CLINIC | Age: 44
Discharge: HOME | End: 2019-12-31
Attending: NURSE PRACTITIONER

## 2019-12-31 ENCOUNTER — COMMUNICATION - HEALTHEAST (OUTPATIENT)
Dept: PHYSICAL MEDICINE AND REHAB | Facility: CLINIC | Age: 44
End: 2019-12-31

## 2019-12-31 DIAGNOSIS — M53.3 SACROILIAC JOINT PAIN: ICD-10-CM

## 2019-12-31 DIAGNOSIS — G89.29 CHRONIC RIGHT-SIDED LOW BACK PAIN WITHOUT SCIATICA: ICD-10-CM

## 2019-12-31 DIAGNOSIS — M54.50 CHRONIC RIGHT-SIDED LOW BACK PAIN WITHOUT SCIATICA: ICD-10-CM

## 2019-12-31 DIAGNOSIS — M47.816 LUMBAR FACET ARTHROPATHY: ICD-10-CM

## 2020-01-02 ENCOUNTER — HOSPITAL ENCOUNTER (OUTPATIENT)
Dept: PHYSICAL MEDICINE AND REHAB | Facility: CLINIC | Age: 45
Discharge: HOME | End: 2020-01-02
Attending: PHYSICIAN ASSISTANT

## 2020-01-02 DIAGNOSIS — M47.816 LUMBAR FACET ARTHROPATHY: ICD-10-CM

## 2020-01-07 ENCOUNTER — HOSPITAL ENCOUNTER (OUTPATIENT)
Dept: MRI IMAGING | Facility: HOSPITAL | Age: 45
Discharge: HOME | End: 2020-01-07
Attending: PHYSICIAN ASSISTANT

## 2020-01-07 ENCOUNTER — COMMUNICATION - HEALTHEAST (OUTPATIENT)
Dept: PHYSICAL MEDICINE AND REHAB | Facility: CLINIC | Age: 45
End: 2020-01-07

## 2020-01-07 ENCOUNTER — HOSPITAL ENCOUNTER (OUTPATIENT)
Dept: PHYSICAL MEDICINE AND REHAB | Facility: CLINIC | Age: 45
Discharge: HOME | End: 2020-01-07
Attending: PHYSICIAN ASSISTANT

## 2020-01-07 DIAGNOSIS — M54.16 LUMBAR RADICULITIS: ICD-10-CM

## 2020-01-07 DIAGNOSIS — M47.816 LUMBAR FACET ARTHROPATHY: ICD-10-CM

## 2020-01-07 DIAGNOSIS — R32 URINARY INCONTINENCE, UNSPECIFIED TYPE: ICD-10-CM

## 2020-01-08 ENCOUNTER — COMMUNICATION - HEALTHEAST (OUTPATIENT)
Dept: PHYSICAL MEDICINE AND REHAB | Facility: CLINIC | Age: 45
End: 2020-01-08

## 2020-01-08 DIAGNOSIS — M54.41 CHRONIC RIGHT-SIDED LOW BACK PAIN WITH RIGHT-SIDED SCIATICA: ICD-10-CM

## 2020-01-08 DIAGNOSIS — G89.29 CHRONIC RIGHT-SIDED LOW BACK PAIN WITH RIGHT-SIDED SCIATICA: ICD-10-CM

## 2020-01-09 ENCOUNTER — COMMUNICATION - HEALTHEAST (OUTPATIENT)
Dept: PHYSICAL MEDICINE AND REHAB | Facility: CLINIC | Age: 45
End: 2020-01-09

## 2020-01-13 ENCOUNTER — OFFICE VISIT - HEALTHEAST (OUTPATIENT)
Dept: PHYSICAL THERAPY | Facility: CLINIC | Age: 45
End: 2020-01-13

## 2020-01-13 DIAGNOSIS — G89.29 CHRONIC RIGHT-SIDED LOW BACK PAIN WITHOUT SCIATICA: ICD-10-CM

## 2020-01-13 DIAGNOSIS — M53.3 SACROILIAC JOINT PAIN: ICD-10-CM

## 2020-01-13 DIAGNOSIS — M54.50 CHRONIC RIGHT-SIDED LOW BACK PAIN WITHOUT SCIATICA: ICD-10-CM

## 2020-01-16 ENCOUNTER — HOSPITAL ENCOUNTER (OUTPATIENT)
Dept: PHYSICAL MEDICINE AND REHAB | Facility: CLINIC | Age: 45
Discharge: HOME | End: 2020-01-16
Attending: PHYSICIAN ASSISTANT

## 2020-01-16 DIAGNOSIS — M54.16 LUMBAR RADICULITIS: ICD-10-CM

## 2020-01-16 DIAGNOSIS — M54.41 CHRONIC RIGHT-SIDED LOW BACK PAIN WITH RIGHT-SIDED SCIATICA: ICD-10-CM

## 2020-01-16 DIAGNOSIS — M47.816 LUMBAR FACET ARTHROPATHY: ICD-10-CM

## 2020-01-16 DIAGNOSIS — G89.29 CHRONIC RIGHT-SIDED LOW BACK PAIN WITH RIGHT-SIDED SCIATICA: ICD-10-CM
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
## 2020-02-26 ENCOUNTER — OFFICE VISIT - HEALTHEAST (OUTPATIENT)
Dept: FAMILY MEDICINE | Facility: CLINIC | Age: 45
End: 2020-02-26

## 2020-02-26 DIAGNOSIS — M54.16 LUMBAR RADICULAR PAIN: ICD-10-CM

## 2020-02-26 DIAGNOSIS — E66.01 MORBID OBESITY (H): ICD-10-CM

## 2020-02-26 DIAGNOSIS — I10 BENIGN ESSENTIAL HYPERTENSION: ICD-10-CM

## 2020-02-26 ASSESSMENT — MIFFLIN-ST. JEOR: SCORE: 1909.12

## 2020-02-27 ENCOUNTER — COMMUNICATION - HEALTHEAST (OUTPATIENT)
Dept: FAMILY MEDICINE | Facility: CLINIC | Age: 45
End: 2020-02-27

## 2020-03-02 ENCOUNTER — OFFICE VISIT - HEALTHEAST (OUTPATIENT)
Dept: FAMILY MEDICINE | Facility: CLINIC | Age: 45
End: 2020-03-02

## 2020-03-02 DIAGNOSIS — G89.29 CHRONIC BILATERAL LOW BACK PAIN WITHOUT SCIATICA: ICD-10-CM

## 2020-03-02 DIAGNOSIS — Z00.00 ENCOUNTER FOR PREVENTIVE CARE: ICD-10-CM

## 2020-03-02 DIAGNOSIS — Z23 NEED FOR IMMUNIZATION AGAINST INFLUENZA: ICD-10-CM

## 2020-03-02 DIAGNOSIS — M54.50 CHRONIC BILATERAL LOW BACK PAIN WITHOUT SCIATICA: ICD-10-CM

## 2020-03-02 DIAGNOSIS — I10 BENIGN ESSENTIAL HYPERTENSION: ICD-10-CM

## 2020-03-02 DIAGNOSIS — J32.0 CHRONIC MAXILLARY SINUSITIS: ICD-10-CM

## 2020-03-02 LAB
ERYTHROCYTE [DISTWIDTH] IN BLOOD BY AUTOMATED COUNT: 11.4 % (ref 11–14.5)
HCT VFR BLD AUTO: 49.7 % (ref 40–54)
HGB BLD-MCNC: 16.9 G/DL (ref 14–18)
MCH RBC QN AUTO: 31.9 PG (ref 27–34)
MCHC RBC AUTO-ENTMCNC: 33.9 G/DL (ref 32–36)
MCV RBC AUTO: 94 FL (ref 80–100)
PLATELET # BLD AUTO: 242 THOU/UL (ref 140–440)
PMV BLD AUTO: 8.2 FL (ref 7–10)
RBC # BLD AUTO: 5.28 MILL/UL (ref 4.4–6.2)
WBC: 10.7 THOU/UL (ref 4–11)

## 2020-03-02 ASSESSMENT — MIFFLIN-ST. JEOR: SCORE: 1883.9

## 2020-03-03 ENCOUNTER — COMMUNICATION - HEALTHEAST (OUTPATIENT)
Dept: FAMILY MEDICINE | Facility: CLINIC | Age: 45
End: 2020-03-03

## 2020-03-03 LAB
ALBUMIN SERPL-MCNC: 4.4 G/DL (ref 3.5–5)
ALP SERPL-CCNC: 98 U/L (ref 45–120)
ALT SERPL W P-5'-P-CCNC: 66 U/L (ref 0–45)
ANION GAP SERPL CALCULATED.3IONS-SCNC: 11 MMOL/L (ref 5–18)
AST SERPL W P-5'-P-CCNC: 32 U/L (ref 0–40)
BILIRUB SERPL-MCNC: 0.6 MG/DL (ref 0–1)
BUN SERPL-MCNC: 13 MG/DL (ref 8–22)
CALCIUM SERPL-MCNC: 9.5 MG/DL (ref 8.5–10.5)
CHLORIDE BLD-SCNC: 102 MMOL/L (ref 98–107)
CHOLEST SERPL-MCNC: 296 MG/DL
CO2 SERPL-SCNC: 27 MMOL/L (ref 22–31)
CREAT SERPL-MCNC: 1.15 MG/DL (ref 0.7–1.3)
FASTING STATUS PATIENT QL REPORTED: NO
GFR SERPL CREATININE-BSD FRML MDRD: >60 ML/MIN/1.73M2
GLUCOSE BLD-MCNC: 79 MG/DL (ref 70–125)
HDLC SERPL-MCNC: 42 MG/DL
LDLC SERPL CALC-MCNC: ABNORMAL MG/DL
POTASSIUM BLD-SCNC: 3.7 MMOL/L (ref 3.5–5)
PROT SERPL-MCNC: 8 G/DL (ref 6–8)
SODIUM SERPL-SCNC: 140 MMOL/L (ref 136–145)
TRIGL SERPL-MCNC: 423 MG/DL

## 2020-03-04 ENCOUNTER — COMMUNICATION - HEALTHEAST (OUTPATIENT)
Dept: FAMILY MEDICINE | Facility: CLINIC | Age: 45
End: 2020-03-04

## 2020-03-05 ENCOUNTER — COMMUNICATION - HEALTHEAST (OUTPATIENT)
Dept: FAMILY MEDICINE | Facility: CLINIC | Age: 45
End: 2020-03-05

## 2020-03-05 LAB
ALLERGEN HOUSE DUST GREER: <0.35 KU/L
ALLERGEN HOUSE DUST HOLLISTER: <0.35 KU/L

## 2020-03-28 ENCOUNTER — COMMUNICATION - HEALTHEAST (OUTPATIENT)
Dept: FAMILY MEDICINE | Facility: CLINIC | Age: 45
End: 2020-03-28

## 2020-04-25 ENCOUNTER — COMMUNICATION - HEALTHEAST (OUTPATIENT)
Dept: FAMILY MEDICINE | Facility: CLINIC | Age: 45
End: 2020-04-25

## 2020-04-25 DIAGNOSIS — I10 BENIGN ESSENTIAL HYPERTENSION: ICD-10-CM

## 2020-04-27 ENCOUNTER — COMMUNICATION - HEALTHEAST (OUTPATIENT)
Dept: FAMILY MEDICINE | Facility: CLINIC | Age: 45
End: 2020-04-27

## 2020-04-28 ENCOUNTER — COMMUNICATION - HEALTHEAST (OUTPATIENT)
Dept: FAMILY MEDICINE | Facility: CLINIC | Age: 45
End: 2020-04-28

## 2020-04-28 ENCOUNTER — AMBULATORY - HEALTHEAST (OUTPATIENT)
Dept: NURSING | Facility: CLINIC | Age: 45
End: 2020-04-28

## 2020-04-29 ENCOUNTER — COMMUNICATION - HEALTHEAST (OUTPATIENT)
Dept: FAMILY MEDICINE | Facility: CLINIC | Age: 45
End: 2020-04-29

## 2020-05-03 ENCOUNTER — COMMUNICATION - HEALTHEAST (OUTPATIENT)
Dept: FAMILY MEDICINE | Facility: CLINIC | Age: 45
End: 2020-05-03

## 2020-05-03 DIAGNOSIS — J32.0 CHRONIC MAXILLARY SINUSITIS: ICD-10-CM

## 2020-06-22 ENCOUNTER — COMMUNICATION - HEALTHEAST (OUTPATIENT)
Dept: FAMILY MEDICINE | Facility: CLINIC | Age: 45
End: 2020-06-22

## 2020-06-23 ENCOUNTER — AMBULATORY - HEALTHEAST (OUTPATIENT)
Dept: FAMILY MEDICINE | Facility: CLINIC | Age: 45
End: 2020-06-23

## 2020-06-23 DIAGNOSIS — B35.3 TINEA PEDIS, UNSPECIFIED LATERALITY: ICD-10-CM

## 2020-06-24 ENCOUNTER — COMMUNICATION - HEALTHEAST (OUTPATIENT)
Dept: FAMILY MEDICINE | Facility: CLINIC | Age: 45
End: 2020-06-24

## 2020-07-22 ENCOUNTER — COMMUNICATION - HEALTHEAST (OUTPATIENT)
Dept: FAMILY MEDICINE | Facility: CLINIC | Age: 45
End: 2020-07-22

## 2020-07-22 DIAGNOSIS — G89.29 CHRONIC PAIN OF RIGHT KNEE: ICD-10-CM

## 2020-07-22 DIAGNOSIS — M25.561 CHRONIC PAIN OF RIGHT KNEE: ICD-10-CM

## 2020-08-08 ENCOUNTER — COMMUNICATION - HEALTHEAST (OUTPATIENT)
Dept: FAMILY MEDICINE | Facility: CLINIC | Age: 45
End: 2020-08-08

## 2020-08-08 DIAGNOSIS — B35.3 TINEA PEDIS, UNSPECIFIED LATERALITY: ICD-10-CM

## 2020-08-08 DIAGNOSIS — G89.29 CHRONIC BILATERAL LOW BACK PAIN WITHOUT SCIATICA: ICD-10-CM

## 2020-08-08 DIAGNOSIS — M54.50 LUMBAR PAIN: ICD-10-CM

## 2020-08-08 DIAGNOSIS — M54.50 CHRONIC BILATERAL LOW BACK PAIN WITHOUT SCIATICA: ICD-10-CM

## 2020-09-01 ENCOUNTER — COMMUNICATION - HEALTHEAST (OUTPATIENT)
Dept: FAMILY MEDICINE | Facility: CLINIC | Age: 45
End: 2020-09-01

## 2020-09-02 ENCOUNTER — COMMUNICATION - HEALTHEAST (OUTPATIENT)
Dept: FAMILY MEDICINE | Facility: CLINIC | Age: 45
End: 2020-09-02

## 2020-10-18 ENCOUNTER — COMMUNICATION - HEALTHEAST (OUTPATIENT)
Dept: PHYSICAL MEDICINE AND REHAB | Facility: CLINIC | Age: 45
End: 2020-10-18

## 2020-10-18 DIAGNOSIS — M47.816 LUMBAR FACET ARTHROPATHY: ICD-10-CM

## 2020-11-29 ENCOUNTER — COMMUNICATION - HEALTHEAST (OUTPATIENT)
Dept: FAMILY MEDICINE | Facility: CLINIC | Age: 45
End: 2020-11-29

## 2020-11-29 DIAGNOSIS — B35.3 TINEA PEDIS, UNSPECIFIED LATERALITY: ICD-10-CM

## 2020-12-04 ENCOUNTER — COMMUNICATION - HEALTHEAST (OUTPATIENT)
Dept: FAMILY MEDICINE | Facility: CLINIC | Age: 45
End: 2020-12-04

## 2021-03-07 ENCOUNTER — COMMUNICATION - HEALTHEAST (OUTPATIENT)
Dept: FAMILY MEDICINE | Facility: CLINIC | Age: 46
End: 2021-03-07

## 2021-03-07 DIAGNOSIS — J32.0 CHRONIC MAXILLARY SINUSITIS: ICD-10-CM

## 2021-03-31 ENCOUNTER — COMMUNICATION - HEALTHEAST (OUTPATIENT)
Dept: FAMILY MEDICINE | Facility: CLINIC | Age: 46
End: 2021-03-31

## 2021-04-09 ENCOUNTER — COMMUNICATION - HEALTHEAST (OUTPATIENT)
Dept: FAMILY MEDICINE | Facility: CLINIC | Age: 46
End: 2021-04-09

## 2021-04-09 DIAGNOSIS — J32.0 CHRONIC MAXILLARY SINUSITIS: ICD-10-CM

## 2021-04-09 DIAGNOSIS — I10 BENIGN ESSENTIAL HYPERTENSION: ICD-10-CM

## 2021-04-14 ENCOUNTER — COMMUNICATION - HEALTHEAST (OUTPATIENT)
Dept: FAMILY MEDICINE | Facility: CLINIC | Age: 46
End: 2021-04-14

## 2021-04-19 ENCOUNTER — AMBULATORY - HEALTHEAST (OUTPATIENT)
Dept: FAMILY MEDICINE | Facility: CLINIC | Age: 46
End: 2021-04-19

## 2021-04-19 ENCOUNTER — COMMUNICATION - HEALTHEAST (OUTPATIENT)
Dept: FAMILY MEDICINE | Facility: CLINIC | Age: 46
End: 2021-04-19

## 2021-04-19 ENCOUNTER — HOSPITAL ENCOUNTER (OUTPATIENT)
Dept: ADMINISTRATIVE | Facility: OTHER | Age: 46
End: 2021-04-19

## 2021-04-19 DIAGNOSIS — R05.9 COUGH: ICD-10-CM

## 2021-04-20 ENCOUNTER — COMMUNICATION - HEALTHEAST (OUTPATIENT)
Dept: FAMILY MEDICINE | Facility: CLINIC | Age: 46
End: 2021-04-20

## 2021-04-20 LAB
SARS-COV-2 PCR COMMENT: NORMAL
SARS-COV-2 RNA SPEC QL NAA+PROBE: NEGATIVE
SARS-COV-2 VIRUS SPECIMEN SOURCE: NORMAL

## 2021-04-21 ENCOUNTER — COMMUNICATION - HEALTHEAST (OUTPATIENT)
Dept: FAMILY MEDICINE | Facility: CLINIC | Age: 46
End: 2021-04-21

## 2021-04-21 ENCOUNTER — COMMUNICATION - HEALTHEAST (OUTPATIENT)
Dept: SCHEDULING | Facility: CLINIC | Age: 46
End: 2021-04-21

## 2021-05-27 VITALS
TEMPERATURE: 97.9 F | DIASTOLIC BLOOD PRESSURE: 84 MMHG | SYSTOLIC BLOOD PRESSURE: 136 MMHG | OXYGEN SATURATION: 96 % | HEART RATE: 73 BPM

## 2021-05-27 NOTE — PATIENT INSTRUCTIONS - HE
Please follow up two weeks post procedure with Lydia to evaluate plan of care.       DISCHARGE INSTRUCTIONS    During office hours (8:00 a.m.- 4:30 p.m.) questions or concerns may be answered  by calling Spine Navigation Nurses at  [phone].     If you experience any problems after hours  please call [phone] and you will be connected to [facility].     All Patients:    ? You may experience an increase in your symptoms for the first 2 days (It may take anywhere between 2 days- 2 weeks for the steroid to have maximum effect).    ? You may use ice on the injection site, as frequently as 20 minutes each hour if needed.    ? You may take your pain medicine.    ? You may continue taking your regular medication after your injection. If you have had a Medial Branch Block you may resume pain medication once your pain diary is completed.    ? You may shower. No swimming, tub bath or hot tub for 48 hours.  You may remove your bandaid/bandage as soon as you are home.    ? You may resume light activities, as tolerated.    ? Resume your usual diet as tolerated.    ? It is strongly advised that you do not drive for 1-3 hours post injection.    ? If you have had oral sedation:  Do not drive for 8 hours post injection.      ? If you have had IV sedation:  Do not drive for 24 hours post injection.  Do not operate hazardous machinery or make important personal/business decisions for 24 hours.      POSSIBLE STEROID SIDE EFFECTS (If steroid/cortisone was used for your procedure)    -If you experience these symptoms, it should only last for a short period      Swelling of the legs                Skin redness (flushing)       Mouth (oral) irritation     Blood sugar (glucose) levels              Sweats                      Mood changes    Headache    Sleeplessness         POSSIBLE PROCEDURE SIDE EFFECTS  -Call the Spine Center if you are concerned    Increased Pain             Increased numbness/tingling         Nausea/Vomiting            Bruising/bleeding at site        Redness or swelling                                                Difficulty walking        Weakness             Fever greater than 100.5    *In the event of a severe headache after an epidural steroid injection that is relieved by lying down, please call the [facility] to speak with a clinical staff member*

## 2021-05-27 NOTE — PROGRESS NOTES
Assessment:   Dalila Malone is a 44 y.o. y.o. male with past medical history significant for obesity, nicotine use who presents today for follow-up regarding right greater than left low back pain with paresthesias radiating into the buttocks as well as left upper extremity paresthesias which began as result of a work injury on February 11, 2019.  MRI lumbar spine shows disc bulges at L2-3 and L4-5.  Patient had almost 100% resolution of his pain physical therapy and prednisone initially, but patient's pain flared up on March 7, 2019.  He was evaluated in our clinic on March 11, 2019 and given a second course of steroids.  Steroids have provided only partial relief of his pain.  Patient continues to have significant right-sided low back pain with paresthesias radiating into the buttock and posterior thigh.  He is neurologically intact on exam.       Plan:     A shared decision making plan was used.  The patient's values and choices were respected.  The following represents what was discussed and decided upon by the physician assistant and the patient.      1.  DIAGNOSTIC TESTS: I reviewed the MRI lumbar spine.  No further diagnostic tests were ordered.    2.  PHYSICAL THERAPY: The patient is currently in physical therapy.  He should continue with the physical therapy program in his home exercises.  I signed a progress note from his physical therapist authorizing 8 additional sessions of physical therapy.    3.  MEDICATIONS: No changes are made to the patient's medications.  He will continue using gabapentin 900 mg 3 times daily, cyclobenzaprine at bedtime as needed, and ibuprofen as needed.    4.  INTERVENTIONS: I offer the patient a right L4-5, L5-S1 transforaminal epidural steroid injection.  Pain has been present for 6 weeks and it is been refractory to conservative treatment including physical therapy, oral steroids x2, neuropathic pain medications, NSAIDs, muscle relaxants.  Patient indicated he would like to  proceed and an order was placed.    5.  PATIENT EDUCATION: I provided a work note indicating that the patient will remain on the work restrictions outlined by complete healthy, CMP including no lifting more than 10 pounds, avoid repetitive bending and twisting, must be able to alternate sit to stand every 10-15 minutes, 4-hour shifts.  Today we will be able to loosen work restrictions following his epidural steroid injection.  -Patient is in agreement the above plan.  All questions were answered.    6.  FOLLOW-UP: I will see patient back in the clinic for a 2-week post procedure follow-up after his right L4-5, L5-S1 transforaminal epidural steroid injection.  If he has any questions or concerns in the meantime, he should not hesitate to call.    Subjective:     Dalila Malone is a 44 y.o. male who presents today for follow-up regarding low back pain which began as a result of a work injury on February 11, 2019.  Patient felt low back pain with numbness and tingling into both buttocks as well as paresthesias in the left upper extremity.  I last saw the patient on March 5, 2019.  At that time he reported 100% resolution of his symptoms with prednisone and physical therapy.  Unfortunately, his pain flared back up on March 7, 2019.  He was seen in our clinic by Margie Reyes CNP CNP on March 11, 2019 and given a course of oral steroids.  Patient reports that the steroids provided partial relief of his pain.  He continues to have significant, daily pain.    Patient complains of right low back pain.  Pain spans along the right lumbar area and extends into the upper buttock and lateral hip.  He denies any pain further down the leg, but he experiences numbness and tingling in the buttock and right posterior thigh.  No paresthesias distal to the knee.  Patient describes the back pain as a stabbing pain.  He rates his pain today is a 3 or 4 out of 10.  At its worst it is a 6 out of 10.  At its best it is a 2 out of 10.  Pain  is aggravated with prolonged sitting, prolonged standing, twisting, pulling, pushing, carrying, and lifting.  Patient reports that walking is limited by pain.  He has to stop and lean against something after walking for short period of time.  He experiences weakness in the leg with prolonged walking.  His pain is alleviated with getting into a crouched position, sitting while leaning to the side, and leaning against something of standing up.  He denies any new symptoms since he was last seen.  He reports that the left upper extremity paresthesias that he experienced with his initial work injury have resolved completely.    Patient is currently in physical therapy.  He is doing his home exercises.  He is using gabapentin 900 mg 3 times daily, cyclobenzaprine at bedtime, and ibuprofen as needed.    Past medical history is reviewed and is unchanged in the interim.    Review of Systems:  Positive for numbness/tingling, weakness, headache, neck pain, trips.  Negative for loss of bowel/bladder control, inability to urinate, difficulty swallowing, difficulty with hand skills, fevers, unintentional weight loss.     Objective:   CONSTITUTIONAL:  Vital signs as above.  No acute distress.  The patient is well nourished and well groomed.    PSYCHIATRIC:  The patient is awake, alert, oriented to person, place and time.  The patient is answering questions appropriately with clear speech.  Normal affect.  HEENT: Normocephalic, atraumatic.  Sclera clear.    SKIN:  Skin over the face, posterior torso, bilateral upper and lower extremities is clean, dry, intact without rashes.  MUSCULOSKELETAL:  Gait is guarded and he ambulates with a flexed forward posture at the hips.    Mild tenderness over the right lower lumbar paraspinal muscles most pronounced at L4-5.    Lumbar flexion moderately restricted.  Lumbar extension severely restricted.  Patient has difficulty even getting to a neutral posture due to increased pain.  The patient has  5/5 strength for the bilateral hip flexors, knee flexors/extensors, ankle dorsiflexors/plantar flexors, ankle evertors/invertors.    NEUROLOGICAL: 1-2+ patellar, achilles reflexes which are symmetric bilaterally.  No ankle clonus bilaterally.  Sensation to light touch is intact in the bilateral L4, L5, and S1 dermatomes.       RESULTS: I reviewed the MRI lumbar spine from [facility] dated February 22, 2019.  At L2-3 there is a disc bulge but no neural compromise.  At L4-5 there is degeneration and a generalized disc bulge which results in mild bilateral foraminal stenosis.  There is also facet arthropathy in the lower lumbar levels and degenerative change in the bilateral SI joints.

## 2021-05-28 NOTE — PROGRESS NOTES
Assessment:   Dalila Malone is a 44 y.o. y.o. male with past medical history significant for obesity, nicotine use who presents today for follow-up regarding right low back pain with radiation into the right buttocks and right lower extremity paresthesias which began as a result of a work injury on February 11 and MRI lumbar spine from February 22, 2019 showed a disc bulge at L4-5.  There is also a small disc bulge at L2-3.  Patient initially had 100% resolution of his pain with physical therapy and prednisone, but the patient's pain flared back up on March 7, 2019.  He underwent a right L4-5, L5-S1 transforaminal epidural steroid injection on April 9, 2018 which did not provide any relief of his pain.  I am concerned that the patient may have a larger disc herniation at this point causing his persistent pain.  Given the distribution of his numbness and tingling, I am concerned that the disc bulge at L2-3 may have progressed.  - Patient also reports intermittent right neck pain and headaches which have been present since his injection.  They were severe 2 days after the injection for improving.  Pain comes intermittently and last for about 2 minutes and resolve spontaneously.  This is not consistent with a spinal headache.  It is not positional.  I suspect it is likely a side effect of the steroid, and I expect that it would improve.  He does not have any cervical radicular symptoms.  -Patient reported a slight sensory deficit in the right anterolateral proximal thigh.  Otherwise, he was neurologically intact.       Plan:     A shared decision making plan was used.  The patient's values and choices were respected.  The following represents what was discussed and decided upon by the physician assistant and the patient.  The patient's [facility] is present for today's visit.    1.  DIAGNOSTIC TESTS: I reviewed the MRI lumbar spine.  I placed an order for an updated MRI lumbar spine for further evaluation.  Patient is [facility]  request that this be an external referral.  She will work on getting it authorized.    2.  PHYSICAL THERAPY: Patient had been going to physical therapy at [facility].  He is disappointed he has not been making progress.  His [facility] suggested that he try morphology.  Patient is interested in this.  I entered a referral for him to try physical therapy at [facility].    3.  MEDICATIONS:    -Patient is using gabapentin 900 mg 3 times daily.  He is tolerating this well.  We will have him increase to 1200 mg 3 times daily.  - The patient is almost out of Flexeril.  He is not sure if it has been helpful.  We will have him try methocarbamol 500 to 1000 mg 3 times daily as needed.  600 mg twice daily.  -Patient can begin using Tylenol as needed.    4.  INTERVENTIONS: No interventions were ordered.  We will await the results of the updated MRI.    5.  PATIENT EDUCATION: I told the patient that his MRI scan looks similar, I would recommend physical therapy and also behavioral health services to work on fear avoidance behavior.  - That if the MRI shows a new or worse problem, he may benefit from additional injections versus a surgical consultation.    6.  WORK: I am going to keep the patient on his same light duty work restrictions.  Please see letter in the letter tab.    7.  FOLLOW-UP: I will see the patient back in the clinic in about 1 week to review the updated MRI lumbar spine.  If he has any questions or concerns in the meantime, he should not hesitate to contact our clinic.    Subjective:     Dalila Malone is a 44 y.o. male who presents today for follow-up regarding right low back pain with radiation into the right buttocks and right lower extremity paresthesias which began as result of a work injury on February 11, 2019.  Patient underwent a right L4-5 and L5-S1 transforaminal epidural steroid injection on April 9, 2019.  Patient reports that he had 100% relief of his pain for the first day and a half after the injection.  After  that, his same pain returned.    Patient complains of right-sided low back pain.  Pain radiates into the right buttock and into the proximal posterolateral thigh.  He has numbness and tingling in the proximal right anterolateral thigh.  He states the right leg feels weaker than the left.  He rates his pain today as a 2.5 out of 10.  At its worst it is a 6 out of 10.  At its best it is a 1.5 out of 10.  Patient states that his current pain is different than the pain he had before his injection.  He says that it feels more sharp and like there is a pressure in his back.  Pain is aggravated trying to stand up straight and lifting.  Pain is alleviated with laying down on his side in a curled up position and applying a hot pack.  He denies any fevers, chills, sweats.  Denies loss of bowel or bladder control.  Denies any left-sided symptoms.    Patient also states that since his injection he has been experiencing intermittent right-sided neck pain and headaches.  He states that it first occurred 2 days after the injection.  He felt the pain radiate up from his neck to the back of his head.  He states that the pain was severe.  He felt like his head was going to explode.  It then improved.  Since then, the neck pain and headaches have continued to improve, but he still experiences episodes 2-3 times per day which he feels pain in the right side of his neck spread up into the head causing a headache.  He states that it only lasts 2 to 3 minutes.  It resolved spontaneously.  He denies any pain radiating the shoulders or down the legs.  Headache is not positional.  He says it does not matter what he is doing or what position he is in.    Patient has been going to physical therapy at [facility].  He does not feel like he is making progress.  He states that his physical therapist wants him to be in certain positions to do exercises but those positions cause pain.  He is using gabapentin 900 mg 3 times daily, ibuprofen 600 mg 2  tabs daily, and Tylenol as needed.  He only has 2 tabs of Flexeril left.  He is not sure if it is helpful.    The patient is working part-time with light duty work restrictions.    Past medical history is reviewed and is unchanged.    Review of Systems:  Positive for numbness/[place], weakness, headache, night pain, trip/stumble/falls.  Negative for loss of bowel/bladder control, inability to urinate, difficulty swallowing, difficulty with hand skills, fevers, unintentional weight loss.     Objective:   CONSTITUTIONAL:  Vital signs as above.  Patient appears uncomfortable and switches positions frequently.  The patient is well nourished and well groomed.    PSYCHIATRIC:  The patient is awake, alert, oriented to person, place and time.  The patient is answering questions appropriately with clear speech.  Normal affect.  HEENT: Normocephalic, atraumatic.  Sclera clear.    SKIN:  Skin over the face, posterior torso, bilateral upper and lower extremities is clean, dry, intact without rashes.  Injection sites without erythema, edema, drainage.  MUSCULOSKELETAL:  Gait is antalgic, favoring the right.  He ambulates with a flexed forward posture at the hips.  The patient is able to heel and toe walk without any difficulty.  Mild tenderness over the right lower lumbar paraspinal muscles.      The patient has 5/5 strength for the bilateral hip flexors, knee flexors/extensors, ankle dorsiflexors/plantar flexors, ankle evertors/invertors.  Mild tenderness palpation right cervical paraspinous muscles with hypertonicity in the right upper trapezius muscle.  Cervical range of motion is full.  5/5 strength bilateral shoulder abductors, elbow flexors/extensors, wrist extensors, finger flexors/abductors.  Straight leg is positive on the right, negative on the left.  NEUROLOGICAL: 1-2+ biceps, triceps, brachial radialis, patellar, achilles reflexes which are symmetric bilaterally.  No ankle clonus bilaterally.  Subjective diminished  sensation right anterolateral thigh.  Sensation to light touch is intact in the bilateral upper extremities throughout and in the bilateral L4, L5, and S1 dermatomes.       RESULTS:  I reviewed the MRI lumbar spine from [facility] imaging dated February 22, 2019.  At L2-3 there is a disc bulge but no neural compromise.  At L4-5 there is degeneration and a generalized disc bulge which results in mild bilateral foraminal stenosis.  There is also facet arthropathy in the lower lumbar levels and degenerative change in the bilateral SI joints.

## 2021-05-28 NOTE — PROGRESS NOTES
Assessment:   Dalila Malone is a 44 y.o. y.o. male with past medical history significant for obesity, nicotine use who presents today for follow-up regarding right low back pain with radiation into the right buttocks and right lower extremity paresthesias which began as result of a work injury on February 11, 2019.  MRI lumbar spine from February 22, 2019 showed a disc bulge at L4-5 and a small disc bulge at L2-3.  Patient initially had 100% resolution of his pain with physical therapy and prednisone, but the patient's pain flared up on March 7, 2019.  He then underwent a right L4-5, L5-S1 transforaminal epidural steroid injection on April 9, 2019 which did not provide any relief of his pain.  Follow-up MRI lumbar spine is stable.  There is no central canal or neuroforaminal stenosis.  I suspect that the patient was initially symptomatic from the lumbar disc bulge particularly at L4-5, but he is now symptomatic from a different pathology.  On exam today, patient seems most symptomatic from right SI joint dysfunction.  He had reproduction of his pain with SI joint provocative maneuvers x4 and significant tenderness palpation of the right SI joint.  -Patient continues to have intermittent right neck pain radiating up to the head causing headache.  I believe this is related to somatic dysfunction of the cervical spine likely due to abnormal posture/gait related to lower back pain.       Plan:     A shared decision making plan was used.  The patient's values and choices were respected.  The following represents what was discussed and decided upon by the physician assistant and the patient.  The patient's [facility] is present for today's visit.    1.  DIAGNOSTIC TESTS: I reviewed the updated MRI lumbar spine.  No further diagnostic tests were ordered.  -If neck pain fails to improve, may need to obtain imaging of the cervical spine as well.    2.  PHYSICAL THERAPY: Patient is scheduled to begin physical therapy at [facility] this  week.    3.  MEDICATIONS: No changes are made to the patient's medications.    -He will continue using gabapentin 1200 mg 3 times daily.  He is tolerating this dose well.  -He will continue using methocarbamol 500 mg as needed.  He finds this more effective than cyclobenzaprine.  - He can continue using Tylenol and ibuprofen needed.  -He can continue using over-the-counter topical pain relieving medications as needed.    4.  INTERVENTIONS: I offer the patient a right sacroiliac joint injection.  I explained how this would be different than the epidural steroid injection.  Patient indicated he would like to proceed and an order was placed.    5.  PATIENT EDUCATION: We could consider a referral to an SI belt if needed.    6.  WORK: I provided an updated work note maintaining his same work restrictions/hours.  Please see letter tab for details.  Hopefully we will be able to loosen his work restrictions and increase his work hours after the SI joint injection.  Patient is very eager to return to full-time work.    7.  FOLLOW-UP: I will see patient back in clinic for a 2-week post procedure follow-up after his right SI joint injection.  If he has any questions or concerns in the meantime, he should not hesitate to call.    Subjective:     Dalila Malone is a 44 y.o. male who presents today for follow-up regarding right low back pain with radiation into the right buttocks and right lower extremity paresthesias which began as result of a work injury on February 11, 2019.  I last saw the patient on April 24, 2019.  At that time he reported no improvement in his pain following a right L4-5, L5-S1 transforaminal epidural steroid injection.  I ordered an updated MRI lumbar spine which will be described below.  He returns today to review the results and discuss treatment options.    Patient denies any change in his pain since he was last seen.  Patient complains of right-sided low back pain.  Pain extends into the right upper  buttock.  Patient points with one finger to the right SI joint pointing to his pain.  He states the pain spreads across the right upper buttock toward the right lateral hip.  Most severe he feels it extend approximately skilled nursing down the right anterolateral thigh.  He continues to have intermittent numbness and tingling in the right buttock and proximal posterior thigh.  He feels that the right leg is weaker than the left.  He rates his pain today as a 2 out of 10.  At its worst it is a 4-10.  At its best it is a 1 out of 10.  Pain is aggravated with standing up straight, sitting for more than 10 to 15 minutes standing more than 10 minutes, rolling over in bed, transitioning in and out of bed.  Pain is alleviated with laying on his right side in a fetal position.  He states that when he lays on his right side he feels more stable in the lower back.    Patient continues to have intermittent headaches since the injection.  States that he feels pain began in the right posterior neck and radiate up to the back of the head causing headaches.  It occurs about 2-3 times per day.  He has found that with cervical flexion the pain resolves.    Patient is scheduled to begin physical therapy at [facility] on Friday, May 3.  He is using gabapentin 1200 mg 3 times daily.  He tolerates this well.  He is using methocarbamol 500 mg at bedtime.  He finds this to be more effective than cyclobenzaprine.  He continues to use Tylenol and ibuprofen as needed.  He is also using topical pain relieving medications as needed.    Past medical history is reviewed and is unchanged.    Review of Systems:  Positive for numbness/tingling, weakness, headache, night pain, trip/stumble/falls.  Negative loss of bowel/bladder control, inability to urinate, difficulty swallowing, difficulty with hand skills, fevers, unintentional weight loss.     Objective:   CONSTITUTIONAL:  Vital signs as above.  Patient appears to be in pain.  The patient is well  nourished and well groomed.    PSYCHIATRIC:  The patient is awake, alert, oriented to person, place and time.  The patient is answering questions appropriately with clear speech.  Normal affect.  HEENT: Normocephalic, atraumatic.  Sclera clear.    SKIN:  Skin over the face, posterior torso, bilateral upper and lower extremities is clean, dry, intact without rashes.  MUSCULOSKELETAL:  Gait is antalgic favoring the right and he ambulates with a flexed forward posture at the hips. Mild tenderness over the right lower lumbar paraspinal muscles.  Severe tenderness palpation right sacroiliac joint.  Patient reports that with palpation of the right sacroiliac joint he feels a reproduction of his typical spreading pain throughout the right buttock.   The patient has 5/5 strength for the bilateral hip flexors, knee flexors/extensors, ankle dorsiflexors/plantar flexors, ankle evertors/invertors.  Positive pelvic distraction test reproducing right sacroiliac joint pain.  Positive thigh thrust right.  Positive Shamar's test right with reproduction of pain localizing to the right SI joint..  Patient reports that with Shamar's maneuver he feels as if his right leg is going to disconnect.  Positive Gaenslen's test right.  NEUROLOGICAL: 1-2+ patellar, achilles reflexes which are symmetric bilaterally.  No ankle clonus bilaterally.  Sensation to light touch is intact in the bilateral L4, L5, and S1 dermatomes.       RESULTS:    I reviewed the updated MRI lumbar spine from [facility] imaging dated April 25, 2019.  This shows a shallow disc bulge at L1-L2 without neural impingement.  There is a shallow disc bulge at L2-3 with mild facet arthropathy but the spinal canal and neuroforaminal are patent.  There is minimal disc bulge at L3-4 with mild facet hypertrophy, no neural impingement.  At L4-5 there is a shallow broad-based disc protrusion.  Spinal canal is patent.  The neural foramen are patent.  At L5-S1 there is mild to moderate  facet hypertrophy bilaterally.  No impingement.  Please see report for further details.     I reviewed the MRI lumbar spine from [facility] imaging dated February 22, 2019.  At L2-3 there is a disc bulge but no neural compromise.  At L4-5 there is degeneration and a generalized disc bulge which results in mild bilateral foraminal stenosis.  There is also facet arthropathy in the lower lumbar levels and degenerative change in the bilateral SI joints.

## 2021-05-29 NOTE — TELEPHONE ENCOUNTER
Called [facility] radiology and spoke with radiologist.  Confirmed that I want the MRI pelvis (not separate MRI scans of the SI joints and hips).

## 2021-05-29 NOTE — TELEPHONE ENCOUNTER
Call to pt with provider's results and recommendations. Pt verbalized understanding. Pt aware that lab orders are in place and he verbalized that he will complete these.

## 2021-05-29 NOTE — TELEPHONE ENCOUNTER
"Call to pt with results. Pt verbalized understanding. Pt states \"my pain is the same. Nothing has changed at all, it hasn't gotten better or worse\". Will update provider.   "

## 2021-05-29 NOTE — TELEPHONE ENCOUNTER
Call from Aleyda at [facility]. They just received order for MRI of pelvis. She states the diagnoses are SI joint pain and right hip pain. She is wondering from provider if they would like images dedicated to the SI joint and to the hip vs. the whole pelvis. Please advise. Call back # is [phone]

## 2021-05-29 NOTE — TELEPHONE ENCOUNTER
Per provider Lydia OLIVO: Could you please call patient and let him know that I reviewed his case with radiology and Dr. Garces.  We could try repeating the SI joint injection.  This injection provided 90% relief but only lasted 3 days.  I cannot guarantee a repeat injection will last longer, but if we are trying to do everything we can to avoid more invasive treatment, I think repeating the injection is reasonable.  Please enter order if patient agrees.  I am also happy to see the patient back in the clinic to discuss.     Patient called in stating he would like to come in and see someone today has he is having a flare of pain again. Information from provider shared. He would like to come in and see her. Assisted pt with scheduling the appointment for this AM.

## 2021-05-29 NOTE — PATIENT INSTRUCTIONS - HE
Gabapentin 300mg Dosing Chart    DATE  MORNING AFTERNOON BEDTIME    Day 1 0 0 1    Day 2 0 0 1    Day 3 0 0 1    Day 4 1 0 1    Day 5 1 0 1    Day 6 1 0 1    Day 7 1 1 1    Day 8 1 1 1    Day 9 1 1 1    Day 10 1 1 2    Day 11 1 1 2    Day 12 1 1 2    Day 13 2 1 2    Day 14 2 1 2    Day 15 2 1 2    Day 16 2 2 2    Day 17 2 2 2    Day 18 2 2 2    Day 19 2 2 3    Day 20 2 2 3    Day 21 2 2 3    Day 22 3 2 3    Day 23 3 2 3    Day 24 3 2 3    Day 25 3 3 3    Day 26 3 3 3    Day 27 3 3 3   Then increase to 4 capsules three times per day.    Continue medication, taking 4 capsules three times daily    Please call if you have any questions regarding how to take your medication  Clinic Phone # [phone]

## 2021-05-29 NOTE — PATIENT INSTRUCTIONS - HE
Follow-up visit in 2 weeks with Lydia OLIVO to discuss injection outcome and determine care plan going forward.       DISCHARGE INSTRUCTIONS    During office hours (8:00 a.m.- 4:30 p.m.) questions or concerns may be answered  by calling Spine Navigation Nurses at  [phone].     If you experience any problems after hours  please call [phone] and you will be connected to [facility] Connection.     All Patients:    ? You may experience an increase in your symptoms for the first 2 days (It may take anywhere between 2 days- 2 weeks for the steroid to have maximum effect).    ? You may use ice on the injection site, as frequently as 20 minutes each hour if needed.    ? You may take your pain medicine.    ? You may continue taking your regular medication after your injection. If you have had a Medial Branch Block you may resume pain medication once your pain diary is completed.    ? You may shower. No swimming, tub bath or hot tub for 48 hours.  You may remove your bandaid/bandage as soon as you are home.    ? You may resume light activities, as tolerated.    ? Resume your usual diet as tolerated.    ? It is strongly advised that you do not drive for 1-3 hours post injection.    ? If you have had oral sedation:  Do not drive for 8 hours post injection.      ? If you have had IV sedation:  Do not drive for 24 hours post injection.  Do not operate hazardous machinery or make important personal/business decisions for 24 hours.      POSSIBLE STEROID SIDE EFFECTS (If steroid/cortisone was used for your procedure)    -If you experience these symptoms, it should only last for a short period      Swelling of the legs                Skin redness (flushing)       Mouth (oral) irritation     Blood sugar (glucose) levels              Sweats                      Mood changes    Headache    Sleeplessness         POSSIBLE PROCEDURE SIDE EFFECTS  -Call the Spine Center if you are concerned    Increased Pain              Increased numbness/tingling        Nausea/Vomiting            Bruising/bleeding at site        Redness or swelling                                                Difficulty walking        Weakness             Fever greater than 100.5    *In the event of a severe headache after an epidural steroid injection that is relieved by lying down, please call the [facility] to speak with a clinical staff member*

## 2021-05-29 NOTE — PROGRESS NOTES
Assessment:   Dalila Malone is a 44 y.o. y.o. male with past medical history significant for obesity, nicotine use who presents today for follow-up regarding right low back pain with radiation into the right buttocks and right lower extremity paresthesias which began as result of a work injury on February 11, 2019.  MRI lumbar spine from February 22, 2019 showed a disc bulge at L4-5 and a small disc bulge at L2-3.  Patient initially had 100% resolution of his pain with physical therapy and prednisone, but then the patient's pain flared back up on March 7, 2019.  He underwent a right L4-5, L5-S1 transforaminal epidural steroid injection on April 9, 2019 which did not provide any relief of his pain.  Follow-up MRI lumbar spine is stable.  There is no central canal or neuroforaminal stenosis.  When I evaluated the patient on April 30, I felt that he was symptomatic from the sacroiliac joint dysfunction.  He patient is status post a right sacroiliac joint injection on May 22, 2019 which provided 90% relief of his pain but only lasted 2 to 3 days.  -Patient notes that right neck pain and headache that he experienced previously has resolved.       Plan:     A shared decision making plan was used.  The patient's values and choices were respected.  The following represents what was discussed and decided upon by the physician assistant and the patient.  The patient's QRC is present for today's visit.    1.  DIAGNOSTIC TESTS: I reviewed the MRI lumbar spine.  I ordered an MRI of the pelvis for further evaluation.    2.  PHYSICAL THERAPY: Patient completed physical therapy at [facility].  He did not tolerate land therapy well.  He was in a pool on vacation and found not to be therapeutic.  I entered a new referral for the patient begin pool therapy.  This will be at [facility].    3.  MEDICATIONS:    -Patient discontinued gabapentin the day after his injection, when his pain was gone.  I recommended he restart  gabapentin.  He may titrate his dose all the way up to the dose he was at previously, 1200 mg 3 times daily.  - Patient can continue using methocarbamol as needed.  -Patient can continue using Tylenol as needed.  -Patient can continue using ibuprofen as needed.    -I also provided a prescription for an SI belt.    4.  INTERVENTIONS: No further interventions were ordered.    5.  REFERRALS: Entered a referral for the patient to see Dr. Valverde at [facility].    6.  WORK: I provided a work note indicating that the patient will maintain his same work restrictions.  Please see letter tab for details.    7.  FOLLOW-UP: A nurse to call the patient with the results of his MRI.  If that shows different pathology, we may need to send him to a different specialist (such as orthopedics) or we could potentially have additional interventions to try here.  If it does not show any additional pathology to expand the patient's pain, we will have the patient see Dr. Valverde and await her recommendations.    Subjective:     Dalila Malone is a 44 y.o. male who presents today for follow-up regarding right low back pain with radiation into the right buttocks and right lower externally paresthesias which began as result of a work injury on February 11, 2019.  Patient status post a right sacroiliac joint injection on May 22, 2019.  Patient reports the injection provided 90% relief of his pain but only lasted about 2 to 3 days.  After that, his pain returned.    Patient complains of right low back pain.  Pain extends into the right buttock.  The right buttock is the most severe area of pain for the patient.  He states that when his wife applies topical pain relieving medications to the right upper buttock, that is the most tender area for him.  Pain extends into the proximal right thigh.  He states that it affects the anterior, lateral, and posterior aspect of the right thigh.  He denies any pain in the groin.  Patient states that he has  noticed some similar, but much milder pain on the left which he attributes to compensating for his pain on the right.  Patient states that he has intermittent numbness and tingling in the same to be high.  He feels that the right leg is weaker than the left.  He is needing to walk with a cane for assistance.  He rates his pain today as a 5-6 out of 10.  At its worst it is an 8 out of 10.  At its best it is a 3 out of 10.  Patient's pain is aggravated with walking, prolonged sitting, prolonged standing.  Pain is alleviated with frequent repositioning.  Patient states that he had 2 episodes of urinary leaking since his injection.  He states that with both of these instances he felt the urge to urinate but cannot make it to the bathroom in time.  He had a small amount of leakage but was unable to stop his stream.  He denies any loss of bowel control.  Denies any fever since the injection.    Patient completed physical therapy at [facility].  He did not tolerate land therapy well.  He notes that he was on vacation and in the pool his pain was under much better control.  Patient stopped gabapentin the day after his injection (when his pain was gone).  He is currently using methocarbamol 500 mg at bedtime as needed, and Tylenol and ibuprofen as needed.    Patient has been working with light duty work restrictions.  He states there is one day that his pain was severe and he could not tolerate it so he went home.    Past medical history is reviewed and is unchanged.    Review of Systems:  Positive for numbness/tingling, weakness, loss of bladder control, pain much worse at night, trip/stumble's.  Negative for loss of bowel control, inability to urinate, headache, difficulty swallowing, difficulty with hand skills, fevers, unintentional weight loss.     Objective:   CONSTITUTIONAL:  Vital signs as above.  No acute distress.  The patient is well nourished and well groomed.    PSYCHIATRIC:  The patient is awake, alert, oriented  to person, place and time.  The patient is answering questions appropriately with clear speech.  Normal affect.  HEENT: Normocephalic, atraumatic.  Sclera clear.    SKIN:  Skin over the face, posterior torso, bilateral upper and lower extremities is clean, dry, intact without rashes.  MUSCULOSKELETAL:  Gait is severely antalgic, favoring the right.  Significant tenderness over the right lower lumbar paraspinal muscles.    Severe tenderness palpation right sacral iliac joint.  The patient has 5/5 strength for the bilateral hip flexors, knee flexors/extensors, ankle dorsiflexors/plantar flexors, ankle evertors/invertors.    NEUROLOGICAL: 1-2+ patellar, achilles reflexes which are symmetric bilaterally.  No ankle clonus bilaterally.  Sensation to light touch is intact in the bilateral L4, L5, and S1 dermatomes.       RESULTS:    I reviewed the updated MRI lumbar spine from [facility] imaging dated April 25, 2019.  This shows a shallow disc bulge at L1-L2 without neural impingement.  There is a shallow disc bulge at L2-3 with mild facet arthropathy but the spinal canal and neuroforaminal are patent.  There is minimal disc bulge at L3-4 with mild facet hypertrophy, no neural impingement.  At L4-5 there is a shallow broad-based disc protrusion.  Spinal canal is patent.  The neural foramen are patent.  At L5-S1 there is mild to moderate facet hypertrophy bilaterally.  No impingement.  Please see report for further details.      I reviewed the MRI lumbar spine from [facility] imaging dated February 22, 2019.  At L2-3 there is a disc bulge but no neural compromise.  At L4-5 there is degeneration and a generalized disc bulge which results in mild bilateral foraminal stenosis.  There is also facet arthropathy in the lower lumbar levels and degenerative change in the bilateral SI joints.

## 2021-05-29 NOTE — TELEPHONE ENCOUNTER
----- Message from Lydia Millan PA-C sent at 6/10/2019 12:34 PM CDT -----  Regarding: results  Please call patient let him know that his MRI pelvis was unremarkable.  No abnormality seen.  I am going to check blood work for possible inflammatory condition which could be causing pain.

## 2021-05-29 NOTE — TELEPHONE ENCOUNTER
----- Message from Lydia Millan PA-C sent at 6/20/2019  7:24 AM CDT -----  Please call patient and let him know his lab work is normal.  Could you please find out how he is doing with his pain?

## 2021-05-29 NOTE — PROGRESS NOTES
Assessment:   Dalila Malone is a 44 y.o. y.o. male with past medical history significant for obesity, nicotine use who presents today for follow-up regarding right low back and right buttock pain with right lower extremity paresthesias and subjective weakness which began as result of a work injury on February 11, 2019.  MRI lumbar spine from February 22 showed a disc bulge at L4-5 and a small disc bulge at L2-3.  Patient initially had 100% resolution of his pain with physical therapy and prednisone, but then the patient's pain flared back up on March 7, 2019.  He underwent a right L4-5, L5-S1 transforaminal epidural steroid injection on April 9, 2019 which did not provide any relief of his pain.  Follow-up MRI lumbar spine with stable.  There is no central canal or neuroforaminal stenosis.  When I reevaluated the patient on April 30, I felt he was most symptomatic from sacroiliac joint dysfunction.  Patient underwent a right sacroiliac joint injection on May 22, 2019 which provided 90% relief of his pain but only lasted 2 to 3 days.  Pain flared up again last night with no injury.  Today, patient was most tender over the lower lumbar facet joints.  -Patient is progressing much lower than the typical patient with this type of problem.  Patient did demonstrate Francisco's signs on exam today including tenderness to superficial touch and over reaction including grimacing, moaning during physical exam.       Plan:     A shared decision making plan was used.  The patient's values and choices were respected.  The following represents what was discussed and decided upon by the physician assistant and the patient.      1.  DIAGNOSTIC TESTS: I reviewed the MRI lumbar spine and MRI pelvis.  I ordered an EMG right lower extremity to further evaluate complaints of weakness in the right leg and numbness in the right leg.  There is no significant right neural impingement in the lumbar spine and no sign of neural impingement in the  pelvis.    2.  PHYSICAL THERAPY: Patient is currently in physical therapy at [facility].  He is doing pool therapy.  He just had his initial evaluation last week.   -Patient may benefit from occupational therapy with Dinorah Durbin.  -Patient may also benefit from behavioral health for fear avoidance behaviors.    3.  MEDICATIONS: Diclofenac 50 mill grams 3 times daily as needed was prescribed.  He will use this instead of ibuprofen.  He can continue using gabapentin 600 mg in the morning, 600 mg in the afternoon, 900 mg at bedtime.  He can continue using Tylenol as needed.  He can continue using methocarbamol as needed.    4.  INTERVENTIONS:    -I recommended a right L3, L4, L5 medial branch block as the next step to determine if pain may be facet mediated.  - If he had a positive response to medial branch blocks, I recommend a right L4-5, L5-S1 facet joint injection.  -If he fails the medial branch blocks, I would like the patient to follow-up with me to determine next steps.  This may involve trying the right sacroiliac joint injection again versus trying another epidural steroid injection.    5.  WORK: Patient reports that he had to leave work 1 hour early today due to his pain.  He states that his pain was severe.  He tried lying down in the bathroom but as soon as he got back up again his pain returned.  I provided a work note indicating that he should be excused from work from June 24 through June 26, 2019.  He may then return to light-duty work restrictions/reduce work hours on June 27, 2019.    6.  FOLLOW-UP: Patient will return to the clinic for his EMG.  We will await those results.  He will also return to the clinic for the medial branch blocks.  If he has any questions or concerns in the meantime, he should not hesitate to call.    Subjective:     Dalila Malone is a 44 y.o. male who presents today for follow-up regarding right low back pain with radiation into the right buttock and proximal thigh with associated  paresthesias and subjective weakness.  Pain began as a result of a work injury on February 11, 2019.  I last saw the patient on Angelica 3, 2019.  At that time I ordered an MRI of the pelvis which was normal.  I also asked him to restart his gabapentin and I referred him to pool therapy.  Patient has had one session of pool therapy so far.  Patient reports that his pain flared up last night.  He reports that prior to last night his pain was at a level 2-4 out of 10.  Last night after getting in bed his pain worsened.  He denies any change in activity yesterday to cause the increased pain.  He states that his pain remains severe today.  He states that he had to leave work early today because of the severity of the pain.    Patient complains of right low back pain.  Today, the most severe area of pain is in the lower lumbar region.  Pain extends into the right buttock and into the proximal posterior lateral thigh.  He states that he has intermittent numbness and tingling in the same distribution as his pain extending about MCC down the posterior lateral thigh.  He continues to feel weak in the right leg.  He states it feels like it is going to give out from under him.  He rates his pain today as a 9-10 out of 10.  At its worst it is a 10 out of 10.  At its best it is a 6 out of 10.  Pain is aggravated with standing and sitting and alleviated with lying down on his side.  He denies any new symptoms since he was last seen.  He states that he has had some urine leakage because he has not been able to get to the bathroom in time.  He denies loss of bowel control.    Patient completed physical therapy at [facility].  He did not tolerate land therapy well.  He is now starting pool therapy at [facility].  He has had one session so far.  He is using gabapentin 600 mg in the morning, 600 mg in the afternoon, and 900 mg at bedtime.  He uses Tylenol and ibuprofen as needed.  He also uses methocarbamol as needed.  Patient does not  "feel that his pain is under adequate control.    Patient states that he had to leave work 1 hour early today because of the severe pain.  He states that he was there for 3 hours.  He tried to lay down in the bathroom to alleviate his pain, but as soon as he got back up again, his pain worsened.    Past medical history is reviewed and is unchanged.    Review of Systems:  Positive for numbness/tingling, weakness, loss of bladder control, wetting pants, headache, pain worse at night, trip/stumble/falls.  Negative for loss of bowel control, inability to urinate, difficulty swallowing, difficulty with hand skills, fevers, unintentional weight loss.     Objective:   CONSTITUTIONAL:  Vital signs as above.  Patient appears to be in pain.  He switches positions frequently throughout the visit.  The patient is well nourished and well groomed.    PSYCHIATRIC:  The patient is awake, alert, oriented to person, place and time.  The patient is answering questions appropriately with clear speech.  Normal affect.  HEENT: Normocephalic, atraumatic.  Sclera clear.    SKIN:  Skin over the face, posterior torso, bilateral upper and lower extremities is clean, dry, intact without rashes.  MUSCULOSKELETAL:  Gait is severely antalgic, favoring the right.  He ambulates using a cane.  He has a flexed forward posture at the hips.  The patient is able to rise onto toes and heels bilaterally with support.  Patient reports severe tenderness to mild palpation over the right lower lumbar paraspinous muscles.  Moderate tenderness palpation right sacroiliac joint.  No significant tenderness palpation left lower lumbar paraspinous muscles or left sacroiliac joint.  Patient reports that he is not able to stand even with neutral posture because his pain will \"shoot up.\"  He refuses to try lumbar extension during the physical exam.  He demonstrates adequate range of motion with lumbar flexion.  The patient has 5/5 strength for the bilateral hip flexors, " knee flexors/extensors, ankle dorsiflexors/plantar flexors, ankle evertors/invertors.  Patient grimaces and moans throughout strength testing, despite demonstrating adequate strength.  NEUROLOGICAL: 1-2+ patellar, achilles reflexes which are symmetric bilaterally.  No ankle clonus bilaterally.  Sensation to light touch is intact in the bilateral L4, L5, and S1 dermatomes.       RESULTS:    I reviewed the MRI pelvis from [facility] dated June 7, 2019.  This shows normal sacroiliac joints.  No evidence for degenerative or inflammatory sacroiliitis.  No fracture or contusion.  No osseous lesion.  No effusion or intra-articular body in the hip joints.  No tendon tear, tendinosis, muscular atrophy, or edema.  No intramuscular mass or hematoma.  There is no edema, mass, fluid collection or abscess in the soft tissues.  No evidence for trochanteric bursitis.  There is no evidence for pelvic mass, free fluid, lymphadenopathy, or inflammatory changes.  Exam is unremarkable.  Please see report for further details.    I reviewed the updated MRI lumbar spine from [facility] imaging dated April 25, 2019.  This shows a shallow disc bulge at L1-L2 without neural impingement.  There is a shallow disc bulge at L2-3 with mild facet arthropathy but the spinal canal and neuroforaminal are patent.  There is minimal disc bulge at L3-4 with mild facet hypertrophy, no neural impingement.  At L4-5 there is a shallow broad-based disc protrusion.  Spinal canal is patent.  The neural foramen are patent.  At L5-S1 there is mild to moderate facet hypertrophy bilaterally.  No impingement.  Please see report for further details.      I reviewed the MRI lumbar spine from [facility] imaging dated February 22, 2019.  At L2-3 there is a disc bulge but no neural compromise.  At L4-5 there is degeneration and a generalized disc bulge which results in mild bilateral foraminal stenosis.  There is also facet arthropathy in the lower lumbar levels and  degenerative change in the bilateral SI joints.

## 2021-05-30 NOTE — PATIENT INSTRUCTIONS - HE
Wt Readings from Last 5 Encounters:   07/19/19 (!) 225 lb (102.1 kg)   07/02/19 (!) 229 lb (103.9 kg)   06/24/19 (!) 229 lb (103.9 kg)   06/03/19 (!) 229 lb (103.9 kg)   04/30/19 (!) 229 lb (103.9 kg)      BP Readings from Last 5 Encounters:   07/19/19 140/90   07/10/19 (!) 150/94   07/02/19 (!) 140/92   06/24/19 (!) 144/96   06/03/19 153/90

## 2021-05-30 NOTE — TELEPHONE ENCOUNTER
----- Message from Lydia Millan PA-C sent at 7/8/2019  3:33 PM CDT -----  Regarding: EMG results  Please call patient let him know his EMG was normal.  No evidence of nerve injury.  We will see how he does with his medial branch blocks later this week.   6

## 2021-05-30 NOTE — PROGRESS NOTES
Assessment:   Dalila Malone is a 44 y.o. y.o. male with past medical history significant for obesity, nicotine use who presents today for follow-up regarding right low back pain with right buttock pain which began as result of a work injury on February 11, 2019.  Determining etiology of pain has been challenging.  MRI lumbar spine shows small disc bulges at L2-3 and L4-5.  MRI pelvis was unremarkable.  EMG right lower extremity was normal.  Patient underwent a right L4-5, L5-S1 transforaminal epidural steroid injection on April 9, 2019 which did not provide any relief of his pain.  He then underwent a right sacroiliac joint injection on May 22, 2018 which provided 90% relief of his pain but only lasted 2 to 3 days.  Patient's pain may be related to lumbar facet arthropathy.  He did have a positive response to a right L3, L4, L5 medial branch block and he is scheduled for a right L4-5, L5-S1 facet joint injection on July 30, 2019.   -Patient's back pain has been worse over the past 1 week due to forceful coughing related to acute bronchitis (presumed viral).  -This patient is progressing much slower than the typical patient with this type of problem.  -Patient was neurologically intact on exam today.       Plan:     A shared decision making plan was used.  The patient's values and choices were respected.  The following represents what was discussed and decided upon by the physician assistant and the patient.      1.  DIAGNOSTIC TESTS: I reviewed MRI lumbar spine, MRI pelvis, EMG right lower extremity.  No further diagnostic tests were ordered.    2.  PHYSICAL THERAPY: Patient reports that he has not gone to physical therapy for the past 3 weeks.  He states that his physical therapist said that he could not return to physical therapy because he had an episode of urinary incontinence.  Patient states that he has not had any episodes of urinary incontinence over the past 3 weeks.  I would like him to return to physical  therapy.  I told the patient if his physical therapist will still not allow him back, he should call and let me know and I will discuss this case with his physical therapist.    3.  MEDICATIONS:    -Patient will continue using gabapentin 900 mg in the morning, 900 mg in the afternoon, 900 mg at bedtime.  -Patient can continue using methocarbamol as needed.  -Patient can continue using Tylenol as needed.  -Patient can continue using diclofenac 50 milligrams 3 times daily.  He will use this instead of ibuprofen.  Patient had been taking both medications.  I explained that he should not use both ibuprofen and diclofenac.  He needs to choose 1.  He states that he will use the diclofenac.    4.  INTERVENTIONS: Patient is scheduled for a right L4-5 and L5-S1 facet joint injection on July 30, 2019.    5.  WORK: Patient's primary care provider provided a work note excusing him from work from July 17 through July 19 for cough.  I provided a note for him to be excused from work today since he was seen here at the spine center.  If he needs additional days off related to his cough, he needs to follow-up with his primary care provider.  Otherwise, he will continue with the same light duty work restrictions.  Please see letter tab for details.    6.  FOLLOW-UP:  -Patient is scheduled to see Dr. Valverde at [facility] on Thursday, July 25.  We will await her recommendations.  - I will see the patient back in clinic for a 2-week post procedure follow-up visit after his right L4-5, L5-S1 facet joint injection.    Subjective:     Dalila Malone is a 44 y.o. male who presents today for follow-up regarding right low back pain with radiation to the right buttock and right proximal thigh with associated paresthesias and subjective weakness.  Pain began as a result of work injury on February 11, 2019.  I last saw the patient on July 2, 2019.  At that time he complained of a flareup of pain after using a cherry  at work.  I  ordered medial branch blocks.  Patient did have a positive response to a right L3, L4, L5 medial branch block and he is scheduled for a right L4-5, L5-S1 facet joint injection on July 30, 2019.  Since the patient was last seen he developed a cough.  He states the forceful coughing exacerbated his back pain.  He was evaluated at his primary care clinic on July 19, 2019 and prescribed albuterol and Tessalon for acute bronchitis (presumed viral).  Patient reports that his cough persists and he continues to feel increased back pain with coughing.    Patient complains of right-sided low back pain.  Patient points to the lumbosacral junction when asked to point to his pain.  He states the pain radiates up toward the right thoracolumbar junction.  The pain also radiates into the right buttock.  He states that he is not having any pain radiating to the lateral hip or thigh.  He reports that he is not having any numbness or tingling.  He continues to feel weak in the right leg.  He rates his pain today as a 9 out of 10.  At its worst it is a 10 out of 10.  At its best it is a 7 out of 10.  Pain is aggravated with coughing and alleviated with lying down in a fetal position.  Patient reports that he has not had any episodes of urinary incontinence over the past 3 weeks.  He denies any episodes of bowel incontinence.    Patient had been going to pool therapy at [facility].  He states that he has not had any physical therapy for the past 3 weeks.  He states that his physical therapist told him that he could not continue with pool therapy because of his urinary incontinence.  He is using diclofenac 50 mg 3 times daily.  He takes gabapentin 900 mg 3 times daily.  He also uses Tylenol and methocarbamol as needed.  Patient reports that he is also been using ibuprofen up to 1200 mg every 4 hours.  This is been helping with his cough as well as his back pain.    Past medical history is reviewed and is pertinent for the evaluation  for cough.    Review of Systems:  Positive for numbness/tingling (not present currently), weakness, loss of bladder control (none for the past 3 weeks), pain worse at night, trip/stumble/falls.  Negative loss of bowel control, inability to urinate, headache, difficulty swallowing, difficulty with hand skills, fevers, unintentional weight loss.     Objective:   CONSTITUTIONAL:  Vital signs as above.  No acute distress.  The patient is well nourished and well groomed.    PSYCHIATRIC:  The patient is awake, alert, oriented to person, place and time.  The patient is answering questions appropriately with clear speech.  Normal affect.  HEENT: Normocephalic, atraumatic.  Sclera clear.    SKIN:  Skin over the face, posterior torso, bilateral upper and lower extremities is clean, dry, intact without rashes.  MUSCULOSKELETAL: Patient ambulates using a cane for assistance.  He ambulates with a flexed forward posture at the hips.   Tenderness most severe over the right lumbar paraspinous muscles of the lumbosacral junction.  Additional tenderness palpation right sacroiliac joint and right mid lumbar paraspinous muscles with hypertonicity.   The patient has 5/5 strength for the bilateral hip flexors, knee flexors/extensors, ankle dorsiflexors/plantar flexors, ankle evertors/invertors.  Straight leg raise on the right causes a pulling sensation in the right buttock.  Straight leg raise negative left.  Range of motion of the right hips is within normal limits.   NEUROLOGICAL: 1-2+ patellar, achilles reflexes which are symmetric bilaterally.  No ankle clonus bilaterally.  Sensation to light touch is intact in the bilateral L4, L5, and S1 dermatomes.       RESULTS:    EMG right lower extremity dated July 8, 2019:  Comment NCS: Normal study  1.  Normal nerve conduction studies right lower extremity.     Comment EMG: Normal study  1.  Normal needle EMG right lower extremity.     Interpretation: Normal study     1. There is no  electrodiagnostic evidence of lumbosacral radiculopathy, lumbosacral plexopathy, or focal neuropathy in the right lower extremity.    I reviewed the MRI pelvis from [facility] dated June 7, 2019.  This shows normal sacroiliac joints.  No evidence for degenerative or inflammatory sacroiliitis.  No fracture or contusion.  No osseous lesion.  No effusion or intra-articular body in the hip joints.  No tendon tear, tendinosis, muscular atrophy, or edema.  No intramuscular mass or hematoma.  There is no edema, mass, fluid collection or abscess in the soft tissues.  No evidence for trochanteric bursitis.  There is no evidence for pelvic mass, free fluid, lymphadenopathy, or inflammatory changes.  Exam is unremarkable.  Please see report for further details.     I reviewed the updated MRI lumbar spine from [facility] dated April 25, 2019.  This shows a shallow disc bulge at L1-L2 without neural impingement.  There is a shallow disc bulge at L2-3 with mild facet arthropathy but the spinal canal and neuroforaminal are patent.  There is minimal disc bulge at L3-4 with mild facet hypertrophy, no neural impingement.  At L4-5 there is a shallow broad-based disc protrusion.  Spinal canal is patent.  The neural foramen are patent.  At L5-S1 there is mild to moderate facet hypertrophy bilaterally.  No impingement.  Please see report for further details.      I reviewed the MRI lumbar spine from [facility] dated February 22, 2019.  At L2-3 there is a disc bulge but no neural compromise.  At L4-5 there is degeneration and a generalized disc bulge which results in mild bilateral foraminal stenosis.  There is also facet arthropathy in the lower lumbar levels and degenerative change in the bilateral SI joints.

## 2021-05-30 NOTE — TELEPHONE ENCOUNTER
Note written excusing him from work until 8/2/19. Please let patient know that I will not provide any additional work notes without him being seen at the spine center.

## 2021-05-30 NOTE — PATIENT INSTRUCTIONS - HE
Thank you for choosing the [facility] for your EMG testing.    The ordering provider will receive your final EMG results within the next few days.  Please follow up with your provider for the results and further treatment recommendations.

## 2021-05-30 NOTE — PATIENT INSTRUCTIONS - HE
DISCHARGE INSTRUCTIONS    During office hours (8:00 a.m.- 4:30 p.m.) questions or concerns may be answered  by calling Spine Navigation Nurses at  [phone].     If you experience any problems after hours  please call [phone] and you will be connected to [facility] Connection.     All Patients:    ? You may experience an increase in your symptoms for the first 2 days (It may take anywhere between 2 days- 2 weeks for the steroid to have maximum effect).    ? You may use ice on the injection site, as frequently as 20 minutes each hour if needed.    ? You may take your pain medicine.    ? You may continue taking your regular medication after your injection. If you have had a Medial Branch Block you may resume pain medication once your pain diary is completed.    ? You may shower. No swimming, tub bath or hot tub for 48 hours.  You may remove your bandaid/bandage as soon as you are home.    ? You may resume light activities, as tolerated.    ? Resume your usual diet as tolerated.    ? It is strongly advised that you do not drive for 1-3 hours post injection.    ? If you have had oral sedation:  Do not drive for 8 hours post injection.      ? If you have had IV sedation:  Do not drive for 24 hours post injection.  Do not operate hazardous machinery or make important personal/business decisions for 24 hours.      POSSIBLE STEROID SIDE EFFECTS (If steroid/cortisone was used for your procedure)    -If you experience these symptoms, it should only last for a short period      Swelling of the legs                Skin redness (flushing)       Mouth (oral) irritation     Blood sugar (glucose) levels              Sweats                      Mood changes    Headache    Sleeplessness         POSSIBLE PROCEDURE SIDE EFFECTS  -Call the Spine Center if you are concerned    Increased Pain             Increased numbness/tingling        Nausea/Vomiting            Bruising/bleeding at site        Redness or swelling                                                 Difficulty walking        Weakness             Fever greater than 100.5    *In the event of a severe headache after an epidural steroid injection that is relieved by lying down, please call the [facility] to speak with a clinical staff member*

## 2021-05-30 NOTE — TELEPHONE ENCOUNTER
"Call from pt requesting letter to be off work tomorrow 7/31 and potentially 8/1. Pt had injection today with Dr. Garces and reports \"I haven't been able to move much at all since the injection today. My wife has helped me to the bathroom and back but that's about all I can do. I'm supposed to return to light duty tomorrow but I'm not going to be able to at all with how I'm feeling right now. I know it takes a couple of days for the shot to start working\". Informed him that provider would be notified of his request and he will be contacted with a response.   "

## 2021-05-30 NOTE — TELEPHONE ENCOUNTER
"Call to pt with provider's response. \"The wireless customer you are trying to reach is not available. Please try again later\". Unable to leave message to return call.   "

## 2021-05-30 NOTE — TELEPHONE ENCOUNTER
The needle portion of the test uses a thin needle electrode with no shocks at that time.  Most people do quite well with this as the needle was very small more like acupuncture.  There is no numbing process as the numbing process would be more painful than the procedure itself.  Again, most people do just fine and the needle portion is a very quick/short portion of the exam.

## 2021-05-30 NOTE — PATIENT INSTRUCTIONS - HE
Please complete your pain diary and return the diary to the Spine Center at your earliest convenience.  The Spine Center will contact you to schedule your next appointment after your pain diary is reviewed by your doctor.  Thank you.    DISCHARGE INSTRUCTIONS    During office hours (8:00 a.m.- 4:30 p.m.) questions or concerns may be answered  by calling  [phone] or spine navigation [phone]. If you experience any problems after hours  please call [phone] and you will be connected to [facility] Connection     All Patients:  ? You may experience an increase in your symptoms for the first 2 days, once the numbing medication wears off.    ? You may resume your regular medication, no pain medication until you have completed your diary    ? You may shower. No swimming, tub bath or hot tub for 24 hours; remove bandage after 4 hours    ? Continue your activities that can cause you pain to test the blocks.                         ? You should not drive for the next 3 to 5 hours (have someone drive you)           POSSIBLE PROCEDURE SIDE EFFECTS  -Call Spine Center if concerned-    Increased Pain  Increased numbness/tingling     Nausea/Vomiting  Bruising/bleeding at site (hematoma)             Swelling at site (edema) Headache  Difficulty walking  Infection        Fever greater than 100.5

## 2021-05-30 NOTE — PROGRESS NOTES
"Patient presents at the request of Lydia Millan for right lower extremity EMG.  He has right gluteal and lower extremity/lateral thigh numbness and tingling with right hip and leg weakness for the past 3 months after his \"back injury \".    EMG/NCS  results: Please see scanned full report.    Comment NCS: Normal study  1.  Normal nerve conduction studies right lower extremity.    Comment EMG: Normal study  1.  Normal needle EMG right lower extremity.    Interpretation: Normal study    1. There is no electrodiagnostic evidence of lumbosacral radiculopathy, lumbosacral plexopathy, or focal neuropathy in the right lower extremity.    The testing was completed in its entirety by the physician.      It was our pleasure caring for your patient today, if there any questions or concerns please do not hesitate to contact us.        "

## 2021-05-30 NOTE — TELEPHONE ENCOUNTER
Call to pt with provider's results and recommendations. Pt verbalized understanding. He is currently scheduled to have medial branch blocks on 7/10.

## 2021-05-30 NOTE — TELEPHONE ENCOUNTER
Called patient to discuss pain diary for right L3,L4,L5 with Dr Tafoya. Patient with good results, order placed for Right L4-5, L5-S1 facet Joint injection. Patient agrees with plan of care. Discussed injection requirements and patient verbalized understanding.

## 2021-05-30 NOTE — PROGRESS NOTES
Assessment:   Dalila Malone is a 44 y.o. y.o. male with past medical history significant for obesity, nicotine use who presents today for follow-up regarding a flareup of pain, now affecting the bilateral lower back with radiation into the right lower extremity with associated numbness and tingling which began yesterday after using the cherry  at work.  Patient has had persistent right low back pain and right buttock pain with right lower extremity paresthesias and subjective weakness which began as result of a work injury on February 11 from 2019.  MRI lumbar spine from February 22 showed a small disc bulge at L2-3.  Patient initially had 100% resolution of his pain with physical therapy and prednisone, but then the patient's pain flared back up on March 7, 2019.  He underwent a right L4-5, L5-S1 transforaminal epidural steroid injection on April 9, 2019 which did not provide any relief of his pain.  Follow-up MRI lumbar spine was stable.  There was no central canal or neuroforaminal stenosis.  When I reevaluated the patient on April 30, I felt he was most symptom medic from sacroiliac joint dysfunction.  Patient underwent a right sacral iliac joint injection on May 22, 2019 which provided 90% relief of his pain but only lasted 2 to 3 days.  Pain flared up again on June 23 and I saw him on June 24.  At that visit I ordered diclofenac.  I also ordered medial branch blocks to determine if he may have facet mediated pain.  I also ordered a right lower extremity EMG.  Patient reports that the diclofenac helped to alleviate his pain that had flared up last week, until the flareup that began last night.  -Patient is progressing much slower than the typical patient with this type of problem.  The patient continues to demonstrate several Francisco's signs on exam including tenderness to superficial palpation, overreaction to physical exam testing, and stimulation test (patient reported reproduction of low back pain with  axial loading).       Plan:     A shared decision making plan was used.  The patient's values and choices were respected.  The following represents what was discussed and decided upon by the physician assistant and the patient.      1.  DIAGNOSTIC TESTS: I reviewed the MRI lumbar spine and MRI pelvis.  Lab work including ESR, CRP, HLA-B27 was normal.  He is scheduled for an EMG right lower externally on June 8, 2019.  No further diagnostic tests were ordered.    2.  PHYSICAL THERAPY: Patient is currently in physical therapy at [facility].  He is doing pool therapy.  He has had 2 sessions so far.  He does feel this is helping with his right leg pain.  This is the patient's third course of physical therapy.  If he fails to improve with this course of physical therapy he may need a functional capacity exam or work hardening.  -Patient may benefit from occupational therapy with Dinorah Durbin.  -Patient may also benefit from behavioral health for fear avoidance behaviors.    3.  MEDICATIONS:    -I provided a course of prednisone 50 mg daily x5 days.  He should not use diclofenac while he is taking the prednisone.  He may resume diclofenac after he completes the course of prednisone.  -Patient can continue using gabapentin 600 mg in the morning, 900 mg in the, 900 mg at bedtime.  -Patient can continue using methocarbamol as needed.  -Patient can continue using Tylenol as needed.  -I also provided a prescription for a walker.    4.  INTERVENTIONS:    -Patient is scheduled for a right L3, L4, L5 medial branch block on July 10, 2019.  -If he has a positive response to the medial branch blocks, I recommend a right L4-5, L5-S1 facet joint injection.  -If he fails the medial branch blocks, I would like the patient to follow-up with me to determine next steps.  This may involve trying a repeat right sacroiliac joint injection versus trying another epidural steroid injection.    5.  WORK: I provided a note indicating that he  should be excused from work July 2 through July 4.  He may return to work on July 5 with same light duty work restrictions plus a restriction of not using the cherry .  Please see letter tab for details.    6.  REFERRALS: I entered a referral to urology.  I cannot explain the urinary incontinence by patient's MRI scan.    7.  FOLLOW-UP: A nurse will call the patient with the results of his EMG.  We will see how the patient does with the medial branch blocks.  If he has any questions or concerns, he should not hesitate to call.    Subjective:     Dalila Malone is a 44 y.o. male who presents today for follow-up regarding right low back pain with radiation to the right buttock and proximal thigh with associated paresthesias and subjective weakness.  Pain began as a result of work injury on February 11, 2019.  I last saw the patient on June 24, 2019.  At that time he reported that his pain had flared up the day before.  At that visit I prescribed diclofenac.  I also ordered lumbar medial branch blocks which is scheduled for July 10 and a right lower extremity EMG which is scheduled for July 8.  Patient reports the diclofenac helped calm down the flareup that he had last week.  Patient reports that last night he began to experience a flareup of pain again, this time affecting both sides of his lower back.  He states that yesterday at work he had to use the cherry .  He states that as the cherry  lowers back to the ground it is jerky and causes a jarring motion to his lower back.  Patient reports that he called his manager let him know about his increased pain after his shift ended yesterday.  He did not go into work today due to the severity of the pain.    Patient complains of back pain which is now bilateral.  Pain spans across the lower back at the belt line.  Patient describes the pain as a throbbing pain.  He continues to have pain which extends into the right buttock.  He denies any pain further down  the right leg.  He continues to have a numb and weak sensation in the right leg.  He states that he is not sure if the right leg is actually weak or if it just feels weak because he is guarding it.  He denies any pain, numbness, tingling, or weakness down the left leg.  He rates his pain today as a 10 out of 10.  At its worst it is a 10 out of 10.  At its best it is an 8 out of 10.  Pain is aggravated with sitting and standing and alleviated with lying down on his right side.  Patient states that since he was last seen he had an episode of loss of bladder control.  He states this happened at work.  He states that he knew that he was holding his urine and had a full bladder but then he just started leaking.  He had one other occasion of urinary incontinence when he was at home last month.  Otherwise, he has not had consistent loss of bladder control.  He denies loss of bowel control.    Patient is in physical therapy at [facility].  He is doing pool therapy.  He has had 2 sessions so far.  He feels that this is helping his right leg.  He is taking diclofenac 50 mg 3 times daily, gabapentin 600 mg in the morning, 900 mg the afternoon, 900 mg at bedtime.  He also uses Tylenol and methocarbamol as needed.    Patient states that after his flareup of pain last week he was able to return to work Thursday and Friday.  Again, he went to work yesterday.  He states that he had to use the cherry  each of these days.  This seemed to aggravate his pain yesterday.  He called in to work today because his pain was so severe.    Past medical history is reviewed and is unchanged.    Review of Systems:  Positive for numbness/tingling, weakness, loss of bladder control, wetting pants, pain much worse at night, trip/stumble/falls.  Negative for loss of bowel control, inability to urinate, headache, difficulty swallowing, difficulty with hand skills, fevers, unintentional weight loss.     Objective:   CONSTITUTIONAL:  Vital  signs as above.  Patient appears to be in pain.  The patient is well nourished and well groomed.    PSYCHIATRIC:  The patient is awake, alert, oriented to person, place and time.  The patient is answering questions appropriately with clear speech.  Patient moans and grimaces throughout his exam today.  HEENT: Normocephalic, atraumatic.  Sclera clear.    SKIN:  Skin over the face, posterior torso, bilateral upper and lower extremities is clean, dry, intact without rashes.  MUSCULOSKELETAL: Patient ambulates with a significantly flexed forward posture at the hips.  He uses a cane for assistance.  He uses the cane incorrectly (holding it in the right), despite my recommendation to hold it in the left.  Lumbar extension is severely restricted.  Patient cannot even reach neutral posture due to increase in low back pain.  Patient reports severe tenderness to superficial palpation over the bilateral lower lumbar paraspinal muscles.  Patient reports reproduction of bilateral low back pain with axial loading.    The patient has 5/5 strength for the bilateral hip flexors, knee flexors/extensors, ankle dorsiflexors/plantar flexors, ankle evertors/invertors.  Patient demonstrates an exaggerated pain response during physical exam testing.  NEUROLOGICAL: 1-2+ patellar, achilles reflexes which are symmetric bilaterally.  No ankle clonus bilaterally.  Sensation to light touch is intact in the bilateral L4, L5, and S1 dermatomes.       RESULTS:    I reviewed the MRI pelvis from [facility] dated June 7, 2019.  This shows normal sacroiliac joints.  No evidence for degenerative or inflammatory sacroiliitis.  No fracture or contusion.  No osseous lesion.  No effusion or intra-articular body in the hip joints.  No tendon tear, tendinosis, muscular atrophy, or edema.  No intramuscular mass or hematoma.  There is no edema, mass, fluid collection or abscess in the soft tissues.  No evidence for trochanteric bursitis.  There is no  evidence for pelvic mass, free fluid, lymphadenopathy, or inflammatory changes.  Exam is unremarkable.  Please see report for further details.     I reviewed the updated [facility] dated April 25, 2019.  This shows a shallow disc bulge at L1-L2 without neural impingement.  There is a shallow disc bulge at L2-3 with mild facet arthropathy but the spinal canal and neuroforaminal are patent.  There is minimal disc bulge at L3-4 with mild facet hypertrophy, no neural impingement.  At L4-5 there is a shallow broad-based disc protrusion.  Spinal canal is patent.  The neural foramen are patent.  At L5-S1 there is mild to moderate facet hypertrophy bilaterally.  No impingement.  Please see report for further details.      I reviewed the [facility] February 22, 2019.  At L2-3 there is a disc bulge but no neural compromise.  At L4-5 there is degeneration and a generalized disc bulge which results in mild bilateral foraminal stenosis.  There is also facet arthropathy in the lower lumbar levels and degenerative change in the bilateral SI joints.

## 2021-05-30 NOTE — PROGRESS NOTES
Assessment:   Dalila Malone is a 44 y.o. y.o. male with past medical history significant for, nicotine use who presents today for follow-up regarding right low back and buttock pain which began as a result of work injury on February 11, 2019.  Determining etiology of pain has been challenging.  Patient did not have any relief after a right L4-5, and L5-S1 transforaminal epidural steroid injection on April 9, 2019.  He then underwent a right sacroiliac joint injection on May 22, 2018 which provided 90% relief of his pain but only lasted 2 to 3 days.  Patient may have some facet mediated pain.  Patient did have a positive response to a right L3, L4, L5 medial branch block and he is scheduled for a right L4-5, L5-S1 facet joint injection on July 30, 2019  -Patient's pain flared up over the past 1 to 2 weeks due to forceful coughing related to acute bronchitis (presumed viral).   - Pain flared up again yesterday after a trip and fall upstairs in which he landed on his right knee and alicia his lower back.  - Patient is awaiting a consultation with an orthopedic surgeon at [facility].  - This patient is progressing much slower than the typical patient with this type of problem.  -He continues to demonstrate several Francisco signs including tenderness to superficial palpation, overreaction to physical exam testing, and stimulation test (reproduction of low back pain with axial loading).  -Patient remains neurologically intact.       Plan:     A shared decision making plan was used.  The patient's values and choices were respected.  The following represents what was discussed and decided upon by the physician assistant and the patient.      1.  DIAGNOSTIC TESTS: I reviewed the MRI lumbar spine, MRI pelvis, EMG right lower extremity.  No further diagnostic tests were ordered.    2.  PHYSICAL THERAPY: Patient had been going to pool therapy.  He is to resume pool therapy in the near future.  It was put on hold after he  reported an episode of loss of bladder control.  Patient has not had any recent episodes of urinary incontinence.  In my opinion, he should return to pool therapy as soon as possible.    3.  MEDICATIONS:    -I refilled the patient's gabapentin.  He takes 900 mill grams in the morning, 900 mg the afternoon, and 1200 mg at bedtime.  - I refilled the patient's methocarbamol 500 to 1000 mg 3 times daily as needed.  -Patient completed a course of prednisone.  He should now resume diclofenac.    4.  INTERVENTIONS: Patient is scheduled for the right L4-5, L5-S1 facet joint injection tomorrow.    5.  WORK: I provided a work note excusing him from work today and tomorrow.  He was seen at our clinic today and will be back in the clinic for his injection tomorrow.  He may return to light-duty work on July 31, 2019 with the same restrictions (see letter tab for details).    6.  FOLLOW-UP: I will see the patient back in clinic for a 2-week post procedure follow-up after his right L4-5, L5-S1 facet joint injection.  If he has any questions or concerns, he should not hesitate to call.    Subjective:     Dalila Malone is a 44 y.o. male who presents today for follow-up regarding right-sided low back and buttock pain which began as a result of a work injury on February 11, 2019.  I last saw the patient on July 22, 2019.  At that time he reported a flareup of pain over the previous week related to forceful coughing due to acute bronchitis.  I excused him from work on July 22 and released him back to light-duty work the following day.  I did give him a prescription for prednisone for the flareup.  Patient reports the prednisone was helpful.  Unfortunately, patient returns today because his pain flared back up again yesterday.  Patient was walking up the stairs at his apartment and he tripped, falling forward and landing on his right knee.  He states that this alicia his lower back and cause more pain.  Patient reports that he felt hot  after this fall due to the increased pain.  He took his temperature and it was 101.2.  He then took a cool bath and laid down and felt much better.  He does not have a temperature here today.  Patient reports that when he woke up today his pain remained severe so he did not go to work.    Patient complains of right-sided low back pain.  Pain radiates to the right buttock and right lateral hip.  He denies any pain further down the leg.  He has numbness and tingling in the same dissipation as his pain.  His right leg feels weaker than his left.  He rates his pain today as a 9 out of 10.  At its worst it is a 9 out of 10.  At its best it is a 7 out of 10.  Pain is aggravated with walking, standing, and sometimes sitting.  Pain is alleviated with lying down on his right side.    Patient had been going to pool therapy at [facility].  This was put on hold when he told his physical therapist about an episode of loss of bladder control x1.  Patient reports that he called the physical therapist facility to see if he could return to physical therapy and they advised him to wait a couple of weeks.  If his pain is still there he is supposed to follow-up.  He is using gabapentin 900 mg in the morning, 900 mg the afternoon, and 1200 mg at bedtime.  He request a refill of methocarbamol.  He finishes prednisone.    Since the patient was last seen he had an appointment at the [facility] with Dr. Valverde.  She has referred him to see 1 of her partners, Dr. Hussein, regarding the sacroiliac joint pain.     Review of Systems:  Positive for numbness/tingling, weakness, pain worse at night, trip/stumble/falls, fevers.  Negative for loss of bowel/bladder control, inability to urinate, headache, difficulty swallowing, difficulty with hand skills.     Objective:   CONSTITUTIONAL:  Vital signs as above.  No acute distress.  The patient is well nourished and well groomed.    PSYCHIATRIC:  The patient is awake, alert, oriented to  person, place and time.  The patient is answering questions appropriately with clear speech.  Normal affect.  HEENT: Normocephalic, atraumatic.  Sclera clear.    SKIN:  Skin over the face, posterior torso, bilateral upper and lower extremities is clean, dry, intact without rashes.  MUSCULOSKELETAL: In the room, patient demonstrates a severely antalgic gait and utilizes a cane for assistance.  Patient reports severe tenderness to superficial palpation of the right lower lumbar paraspinous muscles and right sacroiliac joint.  Patient has an exaggerated pain response including groaning and breath-holding during the physical exam.  Patient reported reproduction of low back pain with axial loading.   The patient has 5/5 strength for the bilateral hip flexors, knee flexors/extensors, ankle dorsiflexors/plantar flexors, ankle evertors/invertors.    NEUROLOGICAL: 1-2+ patellar, achilles reflexes which are symmetric bilaterally.  No ankle clonus bilaterally.  Sensation to light touch is intact in the bilateral L4, L5, and S1 dermatomes.       RESULTS:    EMG right lower extremity dated July 8, 2019:  Comment NCS: Normal study  1.  Normal nerve conduction studies right lower extremity.     Comment EMG: Normal study  1.  Normal needle EMG right lower extremity.     Interpretation: Normal study     1. There is no electrodiagnostic evidence of lumbosacral radiculopathy, lumbosacral plexopathy, or focal neuropathy in the right lower extremity.     I reviewed the MRI pelvis from [facility] imaging dated June 7, 2019.  This shows normal sacroiliac joints.  No evidence for degenerative or inflammatory sacroiliitis.  No fracture or contusion.  No osseous lesion.  No effusion or intra-articular body in the hip joints.  No tendon tear, tendinosis, muscular atrophy, or edema.  No intramuscular mass or hematoma.  There is no edema, mass, fluid collection or abscess in the soft tissues.  No evidence for trochanteric bursitis.  There is no  evidence for pelvic mass, free fluid, lymphadenopathy, or inflammatory changes.  Exam is unremarkable.  Please see report for further details.     I reviewed the updated [facility], [facility].  This shows a shallow disc bulge at L1-L2 without neural impingement.  There is a shallow disc bulge at L2-3 with mild facet arthropathy but the spinal canal and neuroforaminal are patent.  There is minimal disc bulge at L3-4 with mild facet hypertrophy, no neural impingement.  At L4-5 there is a shallow broad-based disc protrusion.  Spinal canal is patent.  The neural foramen are patent.  At L5-S1 there is mild to moderate facet hypertrophy bilaterally.  No impingement.  Please see report for further details.      I reviewed the MRI lumbar [facility] February 22, 2019.  At L2-3 there is a disc bulge but no neural compromise.  At L4-5 there is degeneration and a generalized disc bulge which results in mild bilateral foraminal stenosis.  There is also facet arthropathy in the lower lumbar levels and degenerative change in the bilateral SI joints.

## 2021-05-30 NOTE — TELEPHONE ENCOUNTER
"Pt called to discuss his MyChart message. Pt states his back pain has flared up again. He states this began yesterday \"and now I can barely move today\". He would like to come in and see Lydia Millan today if possible. Ok from provider to place pt in hold spot. Assisted pt in scheduling.   "

## 2021-05-30 NOTE — PROGRESS NOTES
ASSESSMENT/PLAN:    1. Acute bronchitis, unspecified organism (presumed viral)  No s/sx pneumonia or other serious etiology at this time. Not acutely ill.  Will treat symptoms, especially given that coughing worsens his back pain.  - albuterol (PROAIR HFA;PROVENTIL HFA;VENTOLIN HFA) 90 mcg/actuation inhaler; Inhale 2 puffs every 6 (six) hours as needed for wheezing.  Dispense: 18 g; Refill: 11  - benzonatate (TESSALON) 200 MG capsule; Take 1 capsule (200 mg total) by mouth 3 (three) times a day as needed for cough.  Dispense: 20 capsule; Refill: 1    2. Benign essential hypertension  Patient has had many elevated and borderline blood pressure readings.  However, today is just borderline even though he's taking cold medicines.  Has lost 4 lbs since last visit.  Would like him to return in 2-3 weeks once bronchitis is resolved, for potential recheck.  BP Readings from Last 5 Encounters:   07/19/19 140/90   07/10/19 (!) 150/94   07/02/19 (!) 140/92   06/24/19 (!) 144/96   06/03/19 153/90        3. Obesity (BMI 30.0-34.9)  The following high BMI interventions were performed this visit: encouragement to exercise and prescribed dietary intake     4. Lumbar radicular pain  [facility] app[facility] with [facility] next [facility] for f/u.    Return in about 1 month (around 8/19/2019) for Blood pressure follow-up.     SUBJECTIVE:  Dalila Malone is a 44 y.o. male here for cough.      1.5 weeks of cough.  Family has similar sx.  Wife has pneumonia.  Dry cough.  Smoker, but this cough is different/worse  Throat itchy  No fever  Stuffy nose for months.  Uses nose spray.  Takes theraflu and cepacol drops, nyquil.  Symptoms were worse at night and caused him to get very little sleep the last two nights.  This is his third day of missed work.  Little better today.      Has chronic back pain and cough makes it much worse. Pain is mid and low, radiates to left leg.    He has been seeing the [facility] and they've tried meds and injections,  "plus more recently started him on pool therapy.  However, he reported an episode of urinary incontinence with no sensory awareness, so the physical therapist wants him to see the specialist again.  Additionally, the buoyancy of deep water pushes him into a fully upright position and that causes more pain.  Records reviewed:  They had him stop pool therapy due to reported urinary incontinence.    Has had several borderline and elevated blood pressures.  He was actually happy to see BP only borderline today, as sometimes it was even higher.    PHQ-2 Total Score: 0 (7/19/2019 10:36 AM)       The following portions of the patient's history were reviewed and updated as appropriate: allergies, current medications and problem list  Current Outpatient Medications on File Prior to Visit   Medication Sig     acetaminophen (TYLENOL) 325 MG tablet Take 650 mg by mouth every 6 (six) hours as needed for pain.     cyclobenzaprine (FLEXERIL) 5 MG tablet Take 1-2 tablets (5-10 mg total) by mouth at bedtime as needed for muscle spasms.     diclofenac (VOLTAREN) 50 MG EC tablet Take 1 tablet (50 mg total) by mouth 3 (three) times a day as needed.     gabapentin (NEURONTIN) 300 MG capsule Take 4 capsules (1,200 mg total) by mouth 3 (three) times a day.     ibuprofen (ADVIL,MOTRIN) 600 MG tablet Take 1 tablet (600 mg total) by mouth every 6 (six) hours as needed for pain.     methyl salicylate/menth/camph (SALONPAS TOP) Apply topically. salonpas patch OTC     [DISCONTINUED] walker Misc Use as needed for assistance with ambulation (Patient not taking: Reported on 7/19/2019      )     No current facility-administered medications on file prior to visit.         Social History     Tobacco Use   Smoking Status Current Every Day Smoker   Smokeless Tobacco Never Used       OBJECTIVE:  :  /90   Pulse 92   Temp 98.2  F (36.8  C) (Oral)   Resp 24   Ht 5' 7.32\" (1.71 m)   Wt (!) 225 lb (102.1 kg)   SpO2 94%   BMI 34.91 kg/m    Wt " Readings from Last 3 Encounters:   07/19/19 (!) 225 lb (102.1 kg)   07/02/19 (!) 229 lb (103.9 kg)   06/24/19 (!) 229 lb (103.9 kg)     BP Readings from Last 3 Encounters:   07/19/19 140/90   07/10/19 (!) 150/94   07/02/19 (!) 140/92        Gen:  A&A, NAD.  Maintains a hunched posture, moves with some difficulty.  HEENT: Bilateral ear canals clear, pearly gray tympanic membranes.  Oropharynx pink moist and benign. Nasal turbinates slightly erythematous. No tenderness over maxillary or frontal sinuses.  Neck:  supple, no sig LAD or thyromegally  CV:  HRRR, no M/R/G  Resp:  CTAB  Ext:  W&D, no edema

## 2021-05-31 NOTE — TELEPHONE ENCOUNTER
"Called pt and spoke with him. Pt states his pain is the exact same. \"I'm just really uncomfortable and nothing seems to be working. The medications aren't working. I don't want to waste Lydia's time, but I do want to be seen sooner if possible\". Did assist pt in scheduling sooner follow-up appt. Will update provider.   "

## 2021-05-31 NOTE — PROGRESS NOTES
[facility]   Occupational Therapy Initial Evaluation    Patient Name: Dalila Malone  Date of evaluation: 8/16/2019  Referral Diagnosis: Chronic right-sided low back pain without sciatica  Referring provider: Lydia Millan PA-C  Visit Diagnosis:     ICD-10-CM    1. Chronic right-sided low back pain without sciatica M54.5     G89.29    2. Decreased activities of daily living (ADL) R68.89    3. Impaired instrumental activities of daily living (IADL) R68.89        Assessment:      Pt is a 44 year old male who presents today with chief complaints of chronic pain in R low back resulting from a work related injury in February 2019.  Pt reports limited PMH.  Upon physical exam, patient does not exhibit signs of positive neural tension in R or L seated slump, suggesting no neural tension in sciatic nerve distribution.  Pt appears to self-limit trunk and R LE AROM due to pain and demonstrates significant antalgic gait.  Pt's pain interferes with his independence in ADLs such as dressing and showering, as well as IADLs including cooking, household management tasks, and dependent care of his 1 year old son.  Pt also experiences significant psychosocial (marital, family, job) stress as a result of his pain, which also likely contributes to his overall pain experience.  Pt was receptive to pain neuroscience education this date and appears motivated for improved self-management of symptoms.  Pt would benefit from neuromuscular re-education of movement via pain neuroscience education and/or graded motor imagery sequence of pre-movement interventions to alter possible cortical organization, body mechanics training and work station ergonomics, environmental modifications, energy conservation education and planning, sleep hygiene and positioning, sleep preparation routine establishment, neural mobilizing exercise via neural sliders/tensioners, stretching, yoga, qigong, jimena chi, cardiovascular exercise establishment to  decrease nervous system sensitivity, core and/or upper body strengthening, stress and time management for ADL/IADL engagement, pain flare management techniques, goal setting, establishing healthy habits and routines.    Impairments in  pain, ADL's  The POC is dynamic and will be modified on an ongoing basis.  Prognosis to achieve goals is  fair   Pt. is appropriate for skilled OT intervention as outlined in the Plan of Care (POC).    Performance deficits noted include:    Physical skills of endurance and functional mobility for ADL/IADLs.      Limitations/impairment in psychosocial skills of habits to support pain management, healthy routines and behaviors to prevent flare ups of pain, active use of coping strategies for bad pain days, active use of coping strategies for psychosocial stressors, environmental adaptations to develop skills for successful participation in everyday tasks and social situations.    Goals:  Pt will: independently verbalize and report at hoe utilization of 3 health management strategies to improve habits and routines for increased self-management of pain allowing increased participation in functional, every day activities in 12 weeks.  Pt will: utilize sleep hygiene and positioning strategies independently, with visual aids and compensatory tools, to improve sleep as reported by decrease in waking times per night due to pain and decreased time to fall asleep in 6 weeks.  Pt will: verbalize and demonstrate 2 stress management techniques that he utilizes to increase self-management of pain and allow for increased participation with  responsibilities in 6 weeks.      Goals are designed in collaboration and agreement with patient.      Barriers to Learning or Achieving Goals:  No Barriers.       Plan / Patient Instructions:      Plan of Care:   Authorization / Certification Start Date: 08/14/19  Authorization / Certification Number of Visits: up to 6  Communication with: Referral  Source  Patient Related Instruction: Expected outcome;Treatment plan and rationale;Basis of treatment  Times per Week: up to 1  Number of Weeks: up to 12  Number of Visits: up to 6  Discharge Planning: Pt will be discharged from skilled occupational therapy upon completion of functional goals or at plateau in progress towards goals.      Plan for next visit:     Sleep hygiene education    Initiate walking plan and gentle mobility routine    Pain neuroscience education: calming nerves, emotions and pain    Explore strategies to decrease nervous system sensitivity     Subjective:       History of Present Illness:    Dalila is a 44 y.o. male who presents to therapy today with complaints of chronic pain in R low back following a work related injury on 2019.  He was pulling up a chain and felt pain in his low back which turned into burning.  The pain got progressively worse over the next day and he went to the emergency room.  Pt presents with severe antalgic gait this date, favoring his R LE.  Pt has a  son (just turned 1) that he had to miss out of 6 months of helping with raising his son.      Symptoms are constant and getting worse. (Pain has gotten worse since a doctor appointment at the [facility] last week when the doctor was pulling on his leg).     Visual Analogue Scale (VAS) Pain Rating (0-100):68 mm - pt reports higher pain levels since visiting a doctor at the [facility] last week Friday where they pulled/completed PROM on his R LE.  Pain rating at best: 28 mm  Pain rating at worst: 72 mm - immediately following doctor appointment  Pain description: sharp and needles, deep    Activities that increase pain include: reaching, standing up straight, sitting greater than 10 minutes, standing/walking for long periods of time, stairs, work activities.   Activities that decrease pain include: biofreeze, salonpas (in the past), heating pad, positioning to put some pressure on R buttock, keep weight off R side  when walking.      Previous treatments:  Physical therapy - had reached a point where all the pain had gone away (end of March/early April)  4-5 injections - helps for a couple days.  Most recent injection helped for a week.     Functional limitations are described as occurring with:   ascending and descending stairs or curbs  bending  dependent care  lifting  personal cares dressing lower body, donning shoes and socks and washing body  reaching overhead and below waist  repetitive movements  performing routine daily activities  sitting greater than 10 minutes  sleeping  standing greater than 10 minutes  twisting or turning trunk  walking greater than 10 minutes  work Pt is currently restricted to light duty at work    Occupational Hx:   Living Situation:apartment with wife and 3 sons, youngest recently turned 1.   Social Supports: family is supportive with needs, but wife does get frustrated at times.    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   Co-morbid conditions affecting occupational performance: n/a   Past medical/surgical/imaging:   Past Medical History:   Diagnosis Date     Medical history non-contributory      Past Surgical History:   Procedure Laterality Date     NO PAST SURGERIES       [facility] RADIOLOGY  EXAM: XR THORACIC SPINE 3 VWS  LOCATION: [facility]  DATE/TIME: 2/12/2019 1:17 PM  INDICATION: Pain  COMPARISON: None.  FINDINGS: The vertebral bodies of the thoracic spine have normal stature and alignment without evidence of compression fracture. The disc spaces are well-maintained. No significant degenerative changes. Soft tissues unremarkable.     Performance Patterns:   Roles/Routines/Habits when pain is increased and coping with pain: pt states he typically lays in bed for the entire day when he is experiencing a pain flare up.  The only time he gets up is to use the bathroom.  His son will bring him  "food.  Water intake: Pt reports he drinks \"a lot\" of water.  He describes that he is often sweating because of the pain which may contribute to high water intake.   Skips meals: No.    Caffeine intake daily: Drinks 1 cup of coffee/day.  No caffeine throughout the rest of the day.   Smoker: Yes, 6-7 cigarettes/day.    Current Exercise: Not currently exercising.    Equipment/technology available to promote engagement in occupations: pt presents with SE cane, rolling walker that he uses at home.        Objective:      Patient-Reported Outcomes Measurement Information System (VSHART46 Profile v 2.0): is a reliable measure of patient-reported health status for physical, mental, and social well-being.  Today, the patient completed this questionnaire to provide both the patient and team with important patient-reported information about the effect of therapy and allow for better understanding of how various treatments might affect symptoms experienced and occupational performance.  Scores reported in standardized T-scores, where 50 is the mean and 10 is one standard deviation (SD).            Pain Self Efficacy Questionnaire (PSEQ)      Due to time constraints, objective measures not completed during session today but given to patient to complete and bring back when he returns for follow up session.              Examination  1. Chronic right-sided low back pain without sciatica     2. Decreased activities of daily living (ADL)     3. Impaired instrumental activities of daily living (IADL)       Precautions/Restrictions: None  Posture Observation:      General sitting posture is  poor.    ROM:  Pt demonstrates limited trunk extension and appears to self-limite trunk and R LE AROM due to pain    Strength: Not Tested    Functional Mobility: poor     Active neurodynamic screening of nervous system (screen positive if pain is elicited and movement pattern impacts function, may indicate underlying impairment contributing to " ongoing pain):  Upper limb: Median Nerve  R -, L -  Radial Nerve darell -  Ulnar Nerve R +, L +  Seated Slump darell -     Treatment Today:       Neuromuscular Re-education    Pain Neuroscience Education: Sensitive Nerves  Patient educated on the concept of the nervous system as the bodies alarm system, and the role of nociception to warn the body of danger. Peripheral nerve sensitization, hyperalgesia and allodynia were explained using metaphors to promote deep learning.     Homework: Patient encouraged to identify personal yellow flags, and explore/identify activity limitations as a result of nervous system hypersensitivity.     TREATMENT MINUTES COMMENTS   Evaluation (See chart below)   Type: Low  45    Self-care/ Home management     Manual therapy     Neuromuscular Re-education 10 Pain neuroscience education   Therapeutic Activity     Therapeutic Exercises     Cognitive Skills     Standardized cognitive performance testing                                           Blank areas are intentional and mean the treatment did not include these areas.       Total treatment time (including un-coded time) 55 For further activity detail, see above     Evaluation Type  Comorbities Profile/Hx Review Need Eval Modification # Tx Options Decision Making Perform. Deficits   Low  none Brief None None Low 1-3   Moderate multiple Expanded Min/Mod Several Moderate 3-5   High Multiple and impact performance Extensive Significant Multiple High >5        Dinorah Durbin  8/16/2019  2:03 PM

## 2021-05-31 NOTE — TELEPHONE ENCOUNTER
Per Lydia OLIVO: Could you please call the patient and let him know that I entered the wrong date on his RTW letter.  It should read that he should return to work on 8/1/19 (so that he gets one day off to recover after his procedure).      Phone call to patient to inform. Explained new note would be ready by noon today. Stated understanding. Note updated for provider review and signature.

## 2021-05-31 NOTE — TELEPHONE ENCOUNTER
----- Message from Lydia Millan PA-C sent at 7/31/2019  8:18 AM CDT -----  Regarding: follow up  Please call the patient and let him know that I would like him to begin occupational therapy with Dinorah Durbin to help with coping mechanisms for his pain.  I would also like him to see behavioral health.  Please let me know once you have spoken with the patient and I will enter the orders.

## 2021-05-31 NOTE — PROGRESS NOTES
Assessment:   Dalila Malone is a 44 y.o. y.o. male with past medical history significant for nicotine use who presents today for follow-up regarding chronic right low back pain extending into the right upper buttock which began as result of a work injury on February 11, 2019.  Determining etiology of pain has been challenging.  Patient did not have any relief after a right L4-5, L5-S1 transforaminal epidural steroid injection on April 9, 2019.  He then underwent a right sacroiliac joint injection on May 27, 2018 which provided 90% relief of his pain but only lasted 2 to 3 days.  Most recently patient tried a right L4-5, L5-S1 facet joint injection on July 30, 2019 which provided 80% relief of the pain but only lasted 1 week.  Patient reports the pain flared up on August 16, 2019 after he had a physical examination performed by an independent medical examiner.  -Patient has not making progress like a typical patient would make with the same type of problem.  There have been some yellow flags in regards to his pain which seems out of proportion to exam, Francisco signs on previous exams, failure to improve with physical therapy, and frequent flareups of pain requiring time off of work.  He has initiated occupational therapy treatment with Dinorah Durbin for neuromuscular reeducation.  I have also recommended behavioral health, but that is pending authorization from Worker's Compensation.  In my opinion, this patient should work with occupational therapy and possibly also behavioral therapy before pursuing interventional pain management.  If patient completes occupational therapy and worked with behavioral health and he continues to have significant pain, we could consider radio frequency ablation.       Plan:     A shared decision making plan was used.  The patient's values and choices were respected.  The following represents what was discussed and decided upon by the physician assistant and the patient.      1.  DIAGNOSTIC  TESTS: I reviewed the MRI lumbar spine, MRI pelvis, EMG right lower extremity.  No further diagnostic tests were ordered.    2.  PHYSICAL THERAPY: Patient has tried 3 different types of physical therapy.  He tried 2 rounds of land therapy.  He then tried pool therapy.  Pool therapy was put on hold after he reported an episode of urinary incontinence to his physical therapist.  My understanding was that he was going to return to pool therapy as that was an isolated incident and he did not have any further episodes of urinary incontinence.  However, patient reports that he did not call the pool therapy to schedule additional appointments.  After the patient left the clinic I spoke with his [facility].  I think at this point it would be best for the patient to see 1 of our physical therapists here at the spine center.  I entered this referral today.  The patient will be notified of this change.  Ultimately, if he could participate in the medics program I think that would be beneficial for him.  I do not think he would tolerate medics now.  They will need to start with gentle hands-on modalities and range of motion exercises and gradually progress as tolerated.    3.  MEDICATIONS:    -I provided a course of prednisone 50 mg daily x5 days for his flareup.  He will not use diclofenac or ibuprofen while he is using the prednisone.  After he completes the prednisone he can resume use of diclofenac or ibuprofen.  I told the patient he needs to choose 1 NSAID and stick to it.  -Patient can continue using gabapentin 1200 mg 3 times daily.  -Patient can continue using Tylenol as needed.  -She can continue using cyclobenzaprine at bedtime.  -She can continue using lidocaine patches.    4.  INTERVENTIONS: No interventions were ordered.    5.  WORK: Patient reports that he was laid off from his job on August 15, 2019.  He was told that they could no longer accommodate his restrictions.  I did provide an updated letter today maintaining  his same light duty work restrictions for now.    6.  FOLLOW-UP: Patient scheduled to follow-up with me in 2 weeks.  He will keep that appointment.  If he has questions or concerns in the meantime, he should not hesitate to call.      Subjective:     Dalila Malone is a 44 y.o. male who presents today for follow-up regarding *hronic right low back pain extending into the right buttock which began as result of a work injury.  I last saw the patient on August 14, 2019.  Patient reports that on August 16, 2019 he had a physical examination at the independent medical examiner's office.  Pain flared up after that exam.  He states that the examiner was manipulating his hip and back and his pain is been worse since then.    Patient complains of right-sided low back pain.  Pain is located in the right lower lumbar paraspinal region.  Pain extends into the right buttock.  He has some numbness and tingling in the same distribution as the pain in the buttock.  He denies any pain further down the leg.  He denies any numbness, tingling, or weakness down the leg.  He rates his pain today as a 9 or 10 out of 10.  At its worst it is a 10 out of 10.  At its best it is a 6 out of 10.  Pain is aggravated with prolonged sitting and activity.  He denies any alleviating factors for his pain.  He denies any new symptoms since he was last seen.    Patient has not had physical therapy since he went to pool therapy earlier this year.  That was put on hold when he reported an episode of urinary incontinence.  He did not have any additional episodes of urinary incontinence.  I have told the patient to go back to pool therapy, but this did not happen.  He did have one session of occupational therapy with Dinorah Durbin.  Patient is using gabapentin 1200 mg 3 times daily, Tylenol 2 tabs as needed, diclofenac 50 mg 3 times daily, ibuprofen 600 mg as needed, cyclobenzaprine 5 to 10 mg at bedtime, and lidocaine patches.    Past medical history is reviewed  and is pertinent for having the independent medical exam.  -Patient states that his employer laid him off of work on August 15, 2019.  He was told that they can no longer accommodate his restrictions.    Review of Systems:  Positive for numbness/tingling, pain worse at night, trip/stumble/falls.  Negative for weakness, loss of bowel/bladder control, inability to urinate, headache, difficulty swallowing, difficulty with hand skills, fevers, unintentional weight loss.     Objective:   CONSTITUTIONAL:  Vital signs as above.  No acute distress.  The patient is well nourished and well groomed.    PSYCHIATRIC:  The patient is awake, alert, oriented to person, place and time.  The patient is answering questions appropriately with clear speech.  Normal affect.  HEENT: Normocephalic, atraumatic.  Sclera clear.    SKIN:  Skin over the face, posterior torso, bilateral upper and lower extremities is clean, dry, intact without rashes.  MUSCULOSKELETAL:  Gait is antalgic, favoring the right..  Patient reports severe tenderness to light palpation over the right lower lumbar lumbar paraspinal muscles.      The patient has 5/5 strength for the bilateral hip flexors, knee flexors/extensors, ankle dorsiflexors/plantar flexors, ankle evertors/invertors.    NEUROLOGICAL: 1-2+ patellar, achilles reflexes which are symmetric bilaterally.  No ankle clonus bilaterally.  Sensation to light touch is intact in the bilateral L4, L5, and S1 dermatomes.       RESULTS:    EMG right lower extremity dated July 8, 2019:  Comment NCS: Normal study  1.  Normal nerve conduction studies right lower extremity.     Comment EMG: Normal study  1.  Normal needle EMG right lower extremity.     Interpretation: Normal study     1. There is no electrodiagnostic evidence of lumbosacral radiculopathy, lumbosacral plexopathy, or focal neuropathy in the right lower extremity.     I reviewed the MRI pelvis from [facility] imaging dated June 7, 2019.  This shows normal  sacroiliac joints.  No evidence for degenerative or inflammatory sacroiliitis.  No fracture or contusion.  No osseous lesion.  No effusion or intra-articular body in the hip joints.  No tendon tear, tendinosis, muscular atrophy, or edema.  No intramuscular mass or hematoma.  There is no edema, mass, fluid collection or abscess in the soft tissues.  No evidence for trochanteric bursitis.  There is no evidence for pelvic mass, free fluid, lymphadenopathy, or inflammatory changes.  Exam is unremarkable.  Please see report for further details.     I reviewed the updated MRI lumbar spine from [facility] imaging dated April 25, 2019.  This shows a shallow disc bulge at L1-L2 without neural impingement.  There is a shallow disc bulge at L2-3 with mild facet arthropathy but the spinal canal and neuroforaminal are patent.  There is minimal disc bulge at L3-4 with mild facet hypertrophy, no neural impingement.  At L4-5 there is a shallow broad-based disc protrusion.  Spinal canal is patent.  The neural foramen are patent.  At L5-S1 there is mild to moderate facet hypertrophy bilaterally.  No impingement.  Please see report for further details.      I reviewed the MRI lumbar spine from [facility] imaging dated February 22, 2019.  At L2-3 there is a disc bulge but no neural compromise.  At L4-5 there is degeneration and a generalized disc bulge which results in mild bilateral foraminal stenosis.  There is also facet arthropathy in the lower lumbar levels and degenerative change in the bilateral SI joints.

## 2021-05-31 NOTE — PROGRESS NOTES
Assessment:   Dalila Malone is a 44 y.o. y.o. male with past medical history significant for nicotine use who presents today for follow-up regarding right low back and buttock pain which began as result of work injury on February 11, 2019.  Determining etiology of pain has been challenging.  Patient did not have any relief after her right L4-5, L5-S1 transforaminal epidural steroid injection on April 9, 2019.  He then underwent a right sacroiliac joint injection on May 22, 2018 which provided 90% relief of his pain but only lasted 2 to 3 days.  Most recently the patient tried a right L4-5 and L5-S1 facet joint injection on July 30, 2019 which provided 80% relief of his pain but only lasted 1 week.  Patient reports that his pain flared up last week after he had a consultation at the [facility] with the orthopedic specialist who performed SI joint provocative maneuvers.  -This patient has been progressing much slower than the typical patient with this type of problem.  There have been some yellow flags in regards to his pain which seems out of proportion to exam, Francisco signs on previous exams, failure to improve with physical therapy, and frequent flareups of pain requiring time off from work.  He is going to be starting occupational therapy with Dinorah Durbin which I think should be very helpful.  I have also recommended that he see behavioral health, but that is pending authorization from Worker's Compensation.  In my opinion, this patient should work with occupational therapy and possibly also behavioral therapy before pursuing additional interventional pain management.  I reviewed this case with Dr. Garces.  We do not think he would tolerate radiofrequency ablation well.  He has not tolerated procedures well up to this point.       Plan:     A shared decision making plan was used.  The patient's values and choices were respected.  The following represents what was discussed and decided upon by the  physician assistant and the patient.  The patient's [facility] was present for today's visit.    1.  DIAGNOSTIC TESTS: I reviewed the MRI lumbar spine, MRI pelvis, EMG right lower extremity.  No further diagnostic tests were ordered.    2.  PHYSICAL THERAPY: Patient should continue with pool therapy.  - He will begin occupational therapy today.    3.  MEDICATIONS:    -I provided a prescription for Lidoderm patches.  Patient tried salon pass patches with which were helpful at first but then failed to be effective.  He also tried Biofreeze which only provided relief for 30-45 minutes.  -Patient will continue using gabapentin 12 mg 3 times daily.  -Patient can continue using Tylenol 1000 mg every 4 hours, maximum 4000 mill grams daily.  -Patient should use diclofenac 50 mg 3 times daily.  Patient states that he has been taking his medication more often than that.  I cautioned him against using this more than 3 times per day.  -Patient can continue using cyclobenzaprine at bedtime.    4.  INTERVENTIONS: As mentioned above, no additional interventional pain management was ordered.  Patient should work with occupational therapy and possibly also behavioral health before we pursue additional interventional treatment.    5.  WORK: Provided an updated work note maintaining same light duty work restrictions.  I did add that he will work from 11 AM to 3 PM.  Patient notes that sometimes it is hard for him to get to work in the morning because when he first gets out of bed his pain is severe.  Pain improves as he gets up and moves about.  See letter tab for details.  Work restrictions will remain in place for 4 weeks.    6.  FOLLOW-UP: I will see the patient back in the clinic in 4 weeks to follow-up.  If he has any questions or concerns in the meantime, he should not hesitate to call.    Subjective:     Dalila Malone is a 44 y.o. male who presents today for follow-up regarding right low back pain with radiation into the right buttock  which began as result of work injury on February 11, 2019.  Patient status post right L4-5, L5-S1 facet joint injections on July 30, 2019.  Patient reports these injections provided 80% relief of his pain but only lasted 1 week.  Patient states that pain has been more severe since he was evaluated at the [facility] by an orthopedic specialist who performed SI joint provocative maneuvers.  Patient states that he missed work August 9, 11th, 12th, 13th, and today due to increased pain.    Patient complains of right-sided low back pain.  Pain radiates into the right buttock.  He denies any pain radiating into the thigh or down the leg.  He reports that the numbness and tingling down the leg has resolved.  He still has some numbness and tingling in the right buttock.  He denies weakness.  He rates his pain today as a 9-10 out of 10.  At its worst it is a 10 out of 10.  At its best it is a 5-10.  Pain is aggravated with walking, standing, prolonged sitting.  He denies any alleviating factors for his pain.  He denies any new symptoms since he was last seen.    Patient had been going to physical therapy.  He was doing pool therapy.  He is not making progress.  This was put on hold when he reported urinary incontinence.  He start occupational therapy today.  He is using gabapentin 1200 mg 3 times daily, Tylenol 1000 mill grams every 4 hours as needed, diclofenac 50 mg every 4-6 hours.  He uses cyclobenzaprine at bedtime.  He also applies Biofreeze, but he states that this only provides relief for 30 to 45 minutes.    Past medical history is reviewed and is pertinent for having a consultation with Dr. Hussein at the [facility].  Dr. Hussein recommend that he pursue radiofrequency ablation if that was indicated through our clinic.  He said that if he is not able to have a radiofrequency ablation, he would consider a diagnostic CT-guided right sacral iliac joint injection.  If he has a positive  response, he would consider SI joint fusion.    Review of Systems:  Positive for numbness/tingling, pain worse at night, trip/stumble/falls.  Negative for weakness, loss of bowel/bladder control, inability to urinate, headache, difficulty swallowing, difficulty with hand skills, fevers, unintentional weight loss.     Objective:   CONSTITUTIONAL:  Vital signs as above.  Patient demonstrates exaggerated pain response including grimacing, groaning during physical exam.  The patient is well nourished and well groomed.    PSYCHIATRIC:  The patient is awake, alert, oriented to person, place and time.  The patient is answering questions appropriately with clear speech.  Normal affect.  HEENT: Normocephalic, atraumatic.  Sclera clear.    SKIN:  Skin over the face, posterior torso, bilateral upper and lower extremities is clean, dry, intact without rashes.  MUSCULOSKELETAL:  Gait is severely antalgic, favoring the right.  He uses a cane for assistance.  He ambulates with a severely flexed forward posture at the hips.  Patient reports severe tenderness to light palpation over the right lower lumbar paraspinal muscles in the right sacroiliac joint..      The patient has 5/5 strength for the bilateral hip flexors, knee flexors/extensors, ankle dorsiflexors/plantar flexors, ankle evertors/invertors.    NEUROLOGICAL: 1-2+ patellar, achilles reflexes which are symmetric bilaterally.  No ankle clonus bilaterally.  Sensation to light touch is intact in the bilateral L4, L5, and S1 dermatomes.       RESULTS:    EMG right lower extremity dated July 8, 2019:  Comment NCS: Normal study  1.  Normal nerve conduction studies right lower extremity.     Comment EMG: Normal study  1.  Normal needle EMG right lower extremity.     Interpretation: Normal study     1. There is no electrodiagnostic evidence of lumbosacral radiculopathy, lumbosacral plexopathy, or focal neuropathy in the right lower extremity.     I reviewed the MRI pelvis from  [facility] dated June 7, 2019.  This shows normal sacroiliac joints.  No evidence for degenerative or inflammatory sacroiliitis.  No fracture or contusion.  No osseous lesion.  No effusion or intra-articular body in the hip joints.  No tendon tear, tendinosis, muscular atrophy, or edema.  No intramuscular mass or hematoma.  There is no edema, mass, fluid collection or abscess in the soft tissues.  No evidence for trochanteric bursitis.  There is no evidence for pelvic mass, free fluid, lymphadenopathy, or inflammatory changes.  Exam is unremarkable.  Please see report for further details.     I reviewed the updated MRI lumbar spine from [facility] imaging dated April 25, 2019.  This shows a shallow disc bulge at L1-L2 without neural impingement.  There is a shallow disc bulge at L2-3 with mild facet arthropathy but the spinal canal and neuroforaminal are patent.  There is minimal disc bulge at L3-4 with mild facet hypertrophy, no neural impingement.  At L4-5 there is a shallow broad-based disc protrusion.  Spinal canal is patent.  The neural foramen are patent.  At L5-S1 there is mild to moderate facet hypertrophy bilaterally.  No impingement.  Please see report for further details.      I reviewed the MRI lumbar spine from [facility] imaging dated February 22, 2019.  At L2-3 there is a disc bulge but no neural compromise.  At L4-5 there is degeneration and a generalized disc bulge which results in mild bilateral foraminal stenosis.  There is also facet arthropathy in the lower lumbar levels and degenerative change in the bilateral SI joints.

## 2021-05-31 NOTE — PROGRESS NOTES
[facility]   Daily Progress    Patient Name: Dalila Malone  Date: 9/4/2019  Visit #: 2 of 6 by 11/1/19  Referral Diagnosis: Chronic right-sided low back pain without sciatica  Referring provider: Lydia iMllan PA-C  Visit Diagnosis:     ICD-10-CM    1. Chronic right-sided low back pain without sciatica M54.5     G89.29    2. Decreased activities of daily living (ADL) R68.89    3. Impaired instrumental activities of daily living (IADL) R68.89        Assessment:     Pt is engaged in session and willing to make agreed upon changes to daily routine to support improved self-management of symptoms.  He verbalizes understanding of education this date, including the concepts of 'sore but safe' and 'hurt does not equal harm.'  He contributed to setting of realistic goal areas.  Pt would benefit from continued pain neuroscience education for better understanding of the body systems involved in pain processing, aerobic exercise and exploration of other strategies to decrease nervous system sensitivity, and energy conservation education for improved tolerance for meaningful daily activities.  Pt is making progress towards functional goals and is appropriate to continue skilled occupational therapy as outlined in initial plan of care.       Goal Status:  Pt will: independently verbalize and report at home utilization of 3 health management strategies to improve habits and routines for increased self-management of pain allowing increased participation in functional, every day activities in 12 weeks. (Initiated 2 goal areas for improved sleep hygiene this date.)  Pt will: utilize sleep hygiene and positioning strategies independently, with visual aids and compensatory tools, to improve sleep as reported by decrease in waking times per night due to pain and decreased time to fall asleep in 6 weeks. (Pt set goals of walking 3 min/day and having his last cigarette before 7pm for improved sleep  hygiene.)  Pt will: verbalize and demonstrate 2 stress management techniques that he utilizes to increase self-management of pain and allow for increased participation with  responsibilities in 6 weeks.      Plan / Patient Education:     Pt is making progress towards functional goals and will continue skilled occupational therapy as indicated in initial plan of care.    Plan for next treatment:    Follow up with sleep hygiene goal areas, progress as indicated    Pain neuroscience education: stress and pain    Explore calming strategies to decrease nervous system sensitivity    Subjective/Objective:     Current Pain Ratin mm  Best pain level in the last week: 50 mm  Worst pain level in the last week: 88 mm    Pt reports no change in pain symptoms since last visit.  He states that the pain he experienced following the last MD visit has persisted.  He states that mornings are typically the worst, with the highest pain levels.    Treatment Today  TREATMENT MINUTES COMMENTS   Self-care/ Home & Health management 33 Sleep hygiene and positioning education, goal setting for improved sleep hygiene   Neuromuscular Re-education 15 Pain neuroscience education   Therapeutic Exercises     Standardized cognitive performance test           Total 48 For further activity detail, please see below   Blank areas are intentional and mean the treatment did not include these items.       Self Care/Home and Health management Training:    Improve your Sleep: Sleep Hygiene  Pt educated on strategies to promote improved sleep to improve quality of sleep.  Discussed and practiced proper sleep positioning in supine and sidelying.    Pt reports that at most he is getting 3.5-4 hours of sleep each night.  He states that even when he is asleep he is still alert enough to feel pain shooting.  He describes that he typically lays on his L side, as this is the most comfortable position.  After sleep hygiene education, therapist and  patient worked collaboratively to set realistic goal areas to improve patient's sleep hygiene and quality of sleep.      Pt will walk 3 min/day, 5 days/week for aerobic exercise to increase blood flow and oxygen supply to the nervous system for decreased nervous system sensitivity, as well as increasing daily activity level to promote improved sleep initiation at night.     Pt will have his last cigarette before 7pm 5 days/week to decrease the effects of nicotine affecting his sleep.  Plan to progress these goals as indicated at next visit.     Neuromuscular Re-education:    Pain Neuroscience Education: Calming Nerves  Patient was educated regarding endogenous mechanisms and strategies to increase the brain s production of chemicals which decrease pain, such as aerobic exercise and improved pain knowledge. The concepts of pacing, graded exposure,  sore but safe , and  hurt does not equal harm  were discussed. Sleep hygiene and diaphragmatic breathing topics introduced to help calm the nervous system and reduce stress.  During education, patient made insightful observation that he tends to prefer 'underactivity' pattern, avoiding any/all activity as a means of avoiding increased pain.  Therapist and patient reflected on this and the benefits of adding paced activity as a means of increasing overall activity tolerance.  Pt was receptive and verbalized understanding of education.    Dinorah Durbin  9/4/2019  3:08 PM

## 2021-05-31 NOTE — TELEPHONE ENCOUNTER
Patient's [facility] calling with questions/concerns regarding patient. Asking if patient had MBBs yesterday. Explained that he had facet joint injections to the joints that were tested on 7/10/19. Reviewed with her that some patients can find relief as early as 2 days after an injection while other patients don't see relief for up to 2-3 weeks after an injection. Also explained that this is typically done after first set of MBBs instead of doing RF right away. Stated understanding.     She reports patient will be seeing a different surgeon next week as Dr. Valverde did not do what she thought needed to be done. Asking that injection notes as well as new referrals of today be sent. She will submit them to Work comp for review. Also faxed letter for work to her.

## 2021-05-31 NOTE — TELEPHONE ENCOUNTER
Phone call to pt to discuss. Information shared. States understanding and is willing to try them. Aware he will be contacted once referrals/orders have been placed.

## 2021-06-01 NOTE — PROGRESS NOTES
[facility]   Daily Progress    Patient Name: Dalila Malone  Date: 10/2/2019  Visit #: 4 of 6 by 11/1/19  Referral Diagnosis: Chronic right-sided low back pain without sciatica  Referring provider: Lydia Millan PA-C  Visit Diagnosis:     ICD-10-CM    1. Chronic right-sided low back pain without sciatica M54.5     G89.29    2. Decreased activities of daily living (ADL) Z78.9    3. Impaired instrumental activities of daily living (IADL) R68.89        Assessment:     Pt was engaged for duration of session, verbalizing understanding of all education provided this date.  He demonstrates some insight into his own activity patterns and appears to understand the importance of introducing paced activity.  He continues to demonstrate compliance with goal areas despite many weeks between visits.  Pt would benefit from further energy conservation education and follow up with goal areas for increased engagement in ADL/IADLs, as well as further exploration of strategies to decrease nervous system sensitivity to improve activity tolerance for daily activities.  Pt is making progress towards functional goals and is appropriate to continue skilled occupational therapy as outlined in initial plan of care.     Goal Status:  Pt will: independently verbalize and report at home utilization of 3 health management strategies to improve habits and routines for increased self-management of pain allowing increased participation in functional, every day activities in 12 weeks. (Pt has been consistent with deep breathing and walking since last visit.  New focus areas of gentle yoga mobility routine and introducing paced activity.)  Pt will: utilize sleep hygiene and positioning strategies independently, with visual aids and compensatory tools, to improve sleep as reported by decrease in waking times per night due to pain and decreased time to fall asleep in 6 weeks. (Pt has been compliant with goal areas since  "last visit..)  Pt will: verbalize and demonstrate 2 stress management techniques that he utilizes to increase self-management of pain and allow for increased participation with  responsibilities in 6 weeks. (Therapist encouraged continued use of slow breathing vs. deep breathing to calm nervous system allowing for improved management of pain and increased engagement in ADLs.)      Plan / Patient Education:     Pt is making progress towards functional goals and will continue skilled occupational therapy as indicated in initial plan of care.    Plan for next treatment:    Follow up with sleep hygiene goal areas, progress as indicated    Explore calming strategies to decrease nervous system sensitivity - bean bag tapping    Energy conservation education - follow up, Bad Day Plan    Follow up with gentle yoga mobility program, progress as indicated.    Subjective/Objective:     Current Pain Ratin mm  Best pain level in the last week: 32 mm  Worst pain level in the last week: 60 mm    Pt reports that since last visit his pain has not been spiking, but it has not been going down at all either, \"it's just a constant pain.\"  Pt states that he is working with a  regarding worker's compensation coverage.  He has a hearing on 10/11/19.  He notes having one very good pain day in the last week, a day that he had been able to get some very refreshing sleep for ~45-60 minutes.  He believes he was able to get this good sleep because he had been walking more and had exhausted his body.      Treatment Today  TREATMENT MINUTES COMMENTS   Self-care/ Home & Health management 20 Follow up from goal areas set last visit, energy conservation education   Neuromuscular Re-education 15 Pain neuroscience education   Therapeutic Exercises 10 Trauma-informed yoga education and practice (seated and modified)   Standardized cognitive performance test           Total 45 For further activity detail, please see below   Blank " "areas are intentional and mean the treatment did not include these items.       Self Care/Home and Health management Training:    Energy Conservation: Common Activity Patterns  Pt educated on the concept of pacing, a method to help manage and gradually increase activity levels.  Reviewed overactivity pattern and reasons people may use the overactivity approach as well as under activity pattern and reasons for this approach.  Pt identified as utilizing both over- and under-activity patterns, depending on the task.  He states that at times he does fears and avoids pain, but other times he wants to finish what he has started.  Pt verbalized understanding of the importance of paced activity and avoiding long sedentary periods in order to improve endurance and activity tolerance for ADLs and IADLs.      Energy Conservation: Planning  Pt educated on methods to make the most efficient use of his energy, time, and the way they do activities.  Reviewed daily activities and discussed energy expenditure of various activities.  Reviewed activities that use energy vs. Activities that would be considered rest and recovery.  Pt educated on various strategies to increase efficiency including simplifying tasks and schedule, changing environment, tools, and technique, writing down plan, reviewing and monitoring plan to adjust for weekly schedule changes, plan to priortize, and plan to pace.   Homework: Pt was given assignment to choose one \"helping task\" daily (primarily sweeping or mopping) and will complete no more than 3 minutes of that task at a time before taking a planned break.  Reviewed different things he can be doing during rest break.  Then he will get up and continue with another 3 minutes of the activity.          Follow up with Goal Areas  Pt reports he has been doing the walking most days since last visit.  He has been walking every couple hours.  He states he at times he had been walking a bit more than 3 minutes.  He " "states he continues to feel burning pain in back by the end of three minutes but he has continued to push himself.    He reports that deep breathing has not been helpful, he feels that if he breaths too deep, he feels back pain as if he is \"pushing his back out.\"    He continues to stop smoking after 7 pm for improved sleep hygiene.    New focus this week:  - choosing one \"helping task\" to plan and pace each day  - complete gentle seated yoga poses (see below)  - practice up to 1 minute of slow breathing each day for gentle    Neuromuscular Re-education    Pain Neuroscience Education: Lions and Stress  Patient was educated regarding sympathetic nervous system topics as they pertain to pain, including stress biology, fight or flight response, the role of adrenaline and cortisol in pain, the body s immune response and multiple output mechanisms. Metaphors were used to promote deep learning, and the role of self-care techniques useful in calming the sympathetic nervous system were addressed.     Therapeutic Exercise:    Trauma-informed Seated Yoga  Therapist guided patient through the following seated yoga routine for gentle mobility program, increased total circulation, and to promote diaphragmatic breathing for stress reduction and relaxation training:    Seated mountain pose    Lateral neck stretch (completed R and L)    Shoulder circles (completed forward and backward)    Gentle spinal twist (completed R and L)    Seated cat cow   Patient reported some soreness at the end of routine, but acknowledged understanding that this can be expected when completing movements the body is not used to.  Therapist encouraged patient to only move to the point of slight discomfort, rather than attempting to achieve full range of motion.  Pt demonstrated moderate tolerance to yoga this date with limited range of motion but intentional movements.        Dinorah Durbin  10/2/2019  1:59 PM  "

## 2021-06-01 NOTE — PROGRESS NOTES
Assessment:   Dalila Malone is a 44 y.o. y.o. male with past medical history significant for nicotine use who presents today for follow-up regarding chronic right low back pain extending into the right upper buttock which began as result of a work injury on February 11, 2019.  Determining etiology of pain has been challenging.  Patient did not have any relief after a right L4-5, L5-S1 transforaminal epidural steroid injection on April 9, 2019.  He then underwent a right sacroiliac joint injection on May 27, 2018 which provided 90% relief of his pain but only lasted 2 to 3 days.  Most recently patient tried a right L4-5, L5-S1 facet joint injection on July 30, 2019 which provided 80% relief of the pain but only lasted 1 week.  Patient reports the pain flared up on August 16, 2019 after he had a physical examination performed by an independent medical examiner.  Patient states that the oral steroids helped to manage the flareup of pain following the physical exam.  However, he is now back to his baseline pain.  On exam today, patient was actually walking better than I have seen him walk at previous visits.  His pain is now very localized involving the midline and right lower lumbar region, only occasionally extending into the upper buttock.  These are signs of improvement.  -Patient has not making progress like a typical patient would make with the same type of problem.  There have been some yellow flags in regards to his pain which seems out of proportion to exam, Francisco signs on previous exams, failure to improve with physical therapy, and frequent flareups of pain requiring time off of work.  He has initiated occupational therapy treatment with Dinorah Durbin for neuromuscular reeducation.  I have also recommended behavioral health, but that is pending authorization from Worker's Compensation.  In my opinion, this patient should work with occupational therapy and possibly also behavioral therapy before pursuing  interventional pain management.  If patient completes occupational therapy and worked with behavioral health and he continues to have significant pain, we could consider radio frequency ablation.  - The patient had an independent medical exam and the examiner indicated that he could return to full duty work.  The patient is now working with an .  It is unknown whether Worker's Comp. will continue to cover his treatments, according to the patient and his [facility].     Plan:     A shared decision making plan was used.  The patient's values and choices were respected.  The following represents what was discussed and decided upon by the physician assistant and the patient.  The patient's QRC was present for today's visit.    1.  DIAGNOSTIC TESTS: I reviewed the MRI lumbar spine, MRI pelvis, EMG right lower extremity.  No further diagnostic tests were ordered.    2.  PHYSICAL THERAPY: Patient has tried 3 different types of physical therapy.  He tried 2 rounds of land therapy.  He then tried pool therapy.  Pool therapy was put on hold after he reported an episode of urinary incontinence to his physical therapist.  I had referred the patient to physical therapy through [facility] with the goal of medx when I saw him on 8/27/19.  He is scheduled to begin physical therapy on September 13.    3.  MEDICATIONS: No changes are made to the patient's medications.  -Patient will continue the diclofenac 50 mg 3 times daily.  I did encourage him to try decreasing his dose of this medication.  He states that he does not know if any of the medications are helpful anymore.  -Patient can continue using gabapentin 1200 mg 3 times daily.  -Patient can continue using Tylenol as needed.  -He can continue using cyclobenzaprine at bedtime.  -He can continue using lidocaine patches.  I refilled these for him today.    4.  INTERVENTIONS: No interventions were ordered.    5.  WORK: I provided an updated work note for the patient.  Patient reports  that he was laid off from his job on August 15, 2019.  He was told he can no longer accommodate his restrictions.  Given the patient's improvement in walking today and the centralization of his pain, I did provide an updated work note loosening his restrictions to no lifting more than 15 pounds, avoid repetitive bending and twisting at the waist, must feel alternate sit to stand every 30 minutes, 6-hour shifts.  I told the patient that the goal will be to gradually loosen these restrictions as he continues to work with physical therapy, occupational therapy, and potentially behavioral health until he is back to full duty work.  If he does not feel that he could return to full duty work, I would recommend a functional capacity evaluation.  I did discuss the possibility of a functional capacity evaluation now with the patient in the [facility].  We decided that we would hold off on the functional capacity evaluation.The patient is working with an .  The patient and his [facility] are hopeful that [employer]. will resume coverage of his treatments.    6.  REFERRALS: I had referred the patient to behavioral health on July 31, 2019.  This was not authorized by work comp.  I entered a new referral for behavioral health today.  This is an important part of the patient's treatment plan with regards to getting back to work.  I feel that the patient has fear avoidance behaviors that could slow down his progress/prevent him from returning to full duty work if they are not addressed with a behavioral health specialist.    7.  FOLLOW-UP: I will see the patient back in clinic in 4 weeks to follow-up.  If he has any questions or concerns in the meantime, he should not hesitate to call.      Subjective:     Dalila Malone is a 44 y.o. male who presents today for follow-up regarding chronic right low back pain extending into the right buttock which began as result of a work injury.  I last saw the patient on August 27th, 2019.  At that  time he reported a flareup of pain after the physical exam by the independent medical examiner.  I provided a prescription for a course of oral steroids and I referred him [facility].  I recommended he continue to work with occupational therapy (Dinorah Durbin).  Patient states that the oral steroids helped with his flareup of pain.  He states that he is now back to his baseline pain.    Patient complains of right-sided low back pain.  Pain is located in midline and extends to the right lower lumbar paraspinal region.  Pain is no longer radiating into the buttock.  Patient states with prolonged sitting he feels some pain to the right area of the sacrum.  He denies any pain further down the leg.  He denies any numbness or tingling today.  He does feel that the right leg is weaker than the left.  He rates his pain today as a 7 or 8 or 10 out of 10.  At its worst it is a 9 out of 10.  At its best it is a 4 out of 10.  He denies any aggravating or alleviating factors for his pain.  He denies any new symptoms since he was last seen.    Patient has not had physical therapy since he went to pool therapy earlier this year.  That was put on hold when he reported an episode of urinary incontinence.  He did not have any additional episodes of urinary incontinence.  I had told the patient to go back to pool therapy, but this did not happen.  At his visit on August 27, 2019 I entered a new referral for the patient begin physical [facility] with the goal of medics physical therapy.  He is scheduled to begin on September 13, 2019.  He has had 2 sessions of occupational therapy with Dinorah Durbin.  Patient is using gabapentin 1200 mg 3 times daily, Tylenol 2 tabs as needed, diclofenac 50 mg 3 times daily, cyclobenzaprine 5 to 10 mg at bedtime, and lidocaine patches.    Past medical history is reviewed and is unchanged.    Patient states that his employer laid him off of work on August 15,  2019.  He was told that they can no longer accommodate his restrictions.  Patient is now working with an .  He and his [facility] state that it is unclear whether or not Worker's Comp. is going to continue to cover his treatments since the independent medical examiner so that he could return to full duty work.    Review of Systems:  Positive for numbness/tingling, pain worse at night, trip/stumble/falls.  Negative for weakness, loss of bowel/bladder control, inability to urinate, headache, difficulty swallowing, difficulty with hand skills, fevers, unintentional weight loss.     Objective:   CONSTITUTIONAL:  Vital signs as above.  No acute distress.  The patient is well nourished and well groomed.    PSYCHIATRIC:  The patient is awake, alert, oriented to person, place and time.  The patient is answering questions appropriately with clear speech.  Normal affect.  HEENT: Normocephalic, atraumatic.  Sclera clear.    SKIN:  Skin over the face, posterior torso, bilateral upper and lower extremities is clean, dry, intact without rashes.  MUSCULOSKELETAL:  Gait is antalgic, favoring the right (gait is significantly improved compared with previous evaluations).  He uses a cane for assistance.  Tenderness to palpation spinous processes L4-L5, right lower lumbar paraspinous muscles at L4 and L5, mild tenderness palpation right sacral area.    The patient has 5/5 strength for the bilateral hip flexors, knee flexors/extensors, ankle dorsiflexors/plantar flexors, ankle evertors/invertors.    NEUROLOGICAL: 1-2+ patellar, achilles reflexes which are symmetric bilaterally.  No ankle clonus bilaterally.  Sensation to light touch is intact in the bilateral L4, L5, and S1 dermatomes.       RESULTS:    EMG right lower extremity dated July 8, 2019:  Comment NCS: Normal study  1.  Normal nerve conduction studies right lower extremity.     Comment EMG: Normal study  1.  Normal needle EMG right lower extremity.     Interpretation: Normal  study     1. There is no electrodiagnostic evidence of lumbosacral radiculopathy, lumbosacral plexopathy, or focal neuropathy in the right lower extremity.     I reviewed the MRI pelvis from [facility] imaging dated June 7, 2019.  This shows normal sacroiliac joints.  No evidence for degenerative or inflammatory sacroiliitis.  No fracture or contusion.  No osseous lesion.  No effusion or intra-articular body in the hip joints.  No tendon tear, tendinosis, muscular atrophy, or edema.  No intramuscular mass or hematoma.  There is no edema, mass, fluid collection or abscess in the soft tissues.  No evidence for trochanteric bursitis.  There is no evidence for pelvic mass, free fluid, lymphadenopathy, or inflammatory changes.  Exam is unremarkable.  Please see report for further details.     I reviewed the updated MRI lumbar spine from [facility] imaging dated April 25, 2019.  This shows a shallow disc bulge at L1-L2 without neural impingement.  There is a shallow disc bulge at L2-3 with mild facet arthropathy but the spinal canal and neuroforaminal are patent.  There is minimal disc bulge at L3-4 with mild facet hypertrophy, no neural impingement.  At L4-5 there is a shallow broad-based disc protrusion.  Spinal canal is patent.  The neural foramen are patent.  At L5-S1 there is mild to moderate facet hypertrophy bilaterally.  No impingement.  Please see report for further details.      I reviewed the MRI lumbar spine from [facility] imaging dated February 22, 2019.  At L2-3 there is a disc bulge but no neural compromise.  At L4-5 there is degeneration and a generalized disc bulge which results in mild bilateral foraminal stenosis.  There is also facet arthropathy in the lower lumbar levels and degenerative change in the bilateral SI joints.

## 2021-06-01 NOTE — PROGRESS NOTES
[facility] Occupational Therapy   Daily Progress    Patient Name: Dalila Malone  Date: 9/11/2019  Visit #: 3 of 6 by 11/1/19  Referral Diagnosis: Chronic right-sided low back pain without sciatica  Referring provider: Lydia Millan PA-C  Visit Diagnosis:     ICD-10-CM    1. Chronic right-sided low back pain without sciatica M54.5     G89.29    2. Decreased activities of daily living (ADL) R68.89    3. Impaired instrumental activities of daily living (IADL) R68.89      Visit time: 11:02 AM - 11:45 AM    Assessment:     Pt is engaged in session this date.  He demonstrates compliance with goal areas despite some increase in pain with walking program.  He appears motivated for improved self-management of symptoms and verbalizes understanding of the reasoning for goal areas.  Pt appeared to achieve limited relaxation response during progressive muscle relaxation this date, unable to maintain a comfortable position.  He also noted increased pain with deep breathing and had improved response to slow breathing with less trunk expansion.  Pt would benefit from further exploration of strategies to decrease nervous system sensitivity for improved tolerance for meaningful daily activities.  Pt is making progress towards functional goals and is appropriate to continue skilled occupational therapy as outlined in initial plan of care.       Goal Status:  Pt will: independently verbalize and report at home utilization of 3 health management strategies to improve habits and routines for increased self-management of pain allowing increased participation in functional, every day activities in 12 weeks. (Pt reports compliance with goal areas on 5/6 days since last visit.  Initiated strategies to decrease nervous system sensitivity this date.)  Pt will: utilize sleep hygiene and positioning strategies independently, with visual aids and compensatory tools, to improve sleep as reported by decrease in waking times per night  due to pain and decreased time to fall asleep in 6 weeks. (Pt was compliant with goal areas from previous visit 5/6 days.)  Pt will: verbalize and demonstrate 2 stress management techniques that he utilizes to increase self-management of pain and allow for increased participation with  responsibilities in 6 weeks. (Introduced square breathing and progressive muscle relaxation this date.)      Plan / Patient Education:     Pt is making progress towards functional goals and will continue skilled occupational therapy as indicated in initial plan of care.    Plan for next treatment:    Follow up with sleep hygiene goal areas, progress as indicated    Pain neuroscience education: stress and pain    Explore calming strategies to decrease nervous system sensitivity - bean bag tapping    Energy conservation education    Subjective/Objective:     Current Pain Ratin mm  Best pain level in the last week: 41 mm  Worst pain level in the last week: 87 mm    Pt reports no change in pain symptoms since last visit.  He tried to sleep supine as he had been positioned during last visit, however stated that after about 30 minutes he could feel a burning sensation in his back and had to move right away.      Treatment Today  TREATMENT MINUTES COMMENTS   Self-care/ Home & Health management 43 Exploration of strategies to decrease nervous system sensitivity; follow up from goal areas set last visit   Neuromuscular Re-education     Therapeutic Exercises     Standardized cognitive performance test           Total 43 For further activity detail, please see below   Blank areas are intentional and mean the treatment did not include these items.       Self Care/Home and Health management Training:    Square Breathing  Therapist provided education on the concept of mindfulness and focusing on the present moment to relieve stress and worries.  Therapist introduced square breathing as a relaxation strategy to practice mindfulness by  focusing on breathing, counting, and visualization of a square to distract the mind from other worries/anxiety.  Therapist guided patient through 5 cycles of deep breathing, allowing some time for continued independent practice.      Progressive Muscle Relaxation  Provided education on the role of formal relaxation exercises in mediating and managing pain symptoms.  Pt then participated in therapist guided mindfulness and progressive muscle relaxation exercise for 12 minutes to allow for pt to gain a sense of presence of mind and body awareness as a coping strategy for reducing symptoms of chronic pain as well as in preparation for or during ADL/IADL tasks when pain impacts performance and participation.   Pt endorsed mild relaxation response but noted it was difficult as he had trouble maintaining a comfortable position to allow for relaxation.  He reported plan to apply this strategy while trying to fall asleep.     Follow up with Goal Areas  Pt reports that he has been compliant with goal areas 5/6 days since last visit.  He notes that the walking has been flaring his symptoms, noticing a consistent increase in pain beginning ~30 minutes after walking.   Therapist observed patient walking, noting significant antalgia with gait, with patient relying on cane for support. He reports burning sensation by the end of his 3 minute walking period.  Pt and therapist in agreement to continue with 3 minute walking plan rather than increasing the duration at this time, but incorporating deep breathing techniques while walking. Pt reports that he has been having his last cigarette by 7pm.  Continue with this goal area.  Therapist reinforced education from last visit regarding improvement of sleep quality by reducing stimulation prior to sleep.    Dinorah Durbin  9/11/2019  11:02 AM

## 2021-06-02 VITALS — WEIGHT: 222.5 LBS | HEIGHT: 67 IN | BODY MASS INDEX: 34.92 KG/M2

## 2021-06-02 VITALS — WEIGHT: 229 LBS | BODY MASS INDEX: 35.52 KG/M2

## 2021-06-02 VITALS — BODY MASS INDEX: 35.06 KG/M2 | WEIGHT: 226 LBS

## 2021-06-02 VITALS — WEIGHT: 226 LBS | BODY MASS INDEX: 35.06 KG/M2

## 2021-06-02 VITALS — BODY MASS INDEX: 35.52 KG/M2 | WEIGHT: 229 LBS

## 2021-06-02 NOTE — TELEPHONE ENCOUNTER
Pt states that he is still in the process of renewing his medical insurance and would prefer for us not to contact him and he will call us once it goes through.

## 2021-06-02 NOTE — PROGRESS NOTES
[facility] Daily Progress     Patient Name: Dalila Malone  Date: 10/11/2019  Visit #: 3  PTA visit #:  -  Referral Diagnosis:   Chronic right-sided low back pain with right-sided sciatica [M54.41, G89.29]  - Primary       Lumbar radicular pain [M54.16]       Referring provider: Lydia Millan PA-C  Visit Diagnosis:     ICD-10-CM    1. Chronic right-sided low back pain without sciatica M54.5     G89.29    2. Sacroiliac joint pain M53.3      Dalila Malone is a 44 y.o. male who presents to therapy today with chief complaints of chronic low back pain stemming from work comp injury on Feruary 11th, 2019. The patient does report very slow improvements in pain lately. He has been through multiple rounds of PT and injections with only minimal or short term benefit. Currently limited in ability to work, do activities around the house, walk any prolonged period of time. With examination, the patient demonstrates antalgic gait with cane, significant lumbar ROM in all motions, tight HS with possible + neural tension on right, + SI pain provocation testing, but normal myotome/dermatome screens. The patient will likely benefit from skilled PT to improve his mobility, strength, pain, gait, and overall function.    Assessment:     HEP/POC compliance is  good .     Patient with difficulty performing squats outside of clinic, dicussed modification to off of bed or back to wall instead for improved comfort. He does well with use of bar in clinic and able to complete full 90 degree squat. Gait into clinic today is improved with slightly quicker and more normalized yuly, still antalgic and slightly flexed posture present.    Patient encouraged to use the NuStep at the gym for aerobic exercise, has not gone to gym yet.     Patient will benefit from further PT to give guidance on exercise and activity progression, with extra education on pain response.    Goal Status:  Pt. will be independent with home exercise program in : 4  weeks  Pt. will report decreased intensity, frequency of : Pain;in 6 weeks;Comment  Comment:: 50%    Pt will: feel comfortable to return to work without increased pain in 12 weeks:  Pt will: be able to do more activity including walking/exercise 5-10 minutes in 3-4 weeks without increased pain:  Pt will: improve SLR mobility to 60 degrees on the right in 4 weeks for improved ability to bend in 6 weeks:      Plan / Patient Education:     Continue with initial plan of care.     Plan for next visit: review HEP, gentle stretching exercises, functional strengthening and mobility, possible MT if tolerating well for SI, glut, lumbar paraspinals.     Subjective:     Pain Rating: Same as previous.    The patient reports that his wife's insurance will help cover his future appointments so he is willing to continue the current POC. He tried the exercises and reports increased pain following the partial squat exercise which resolved within 15 minutes. He has not tried the exercises since then. No falls reported.     Objective:     Quad stretch and leg swings added to HEP this session.     Patient flexed forward and laterally to the right with gait, using SEC, increased gait speed as compared to previous session.     Exercises:  Exercise #1: SKTC X 10  Comment #1: pelvic tilting X 10   Exercise #2: LTR X 10   Comment #2: SI Shotgun MET X 10  Exercise #3: NuStep x5min level 3  Comment #3: supine hamstring stretch x10  Exercise #4: partial squat with UE support x10  Comment #4: standing quad stretch, strap assisted x10  Exercise #5: leg swings x10      Treatment Today     TREATMENT MINUTES COMMENTS   Evaluation     Self-care/ Home management     Manual therapy     Neuromuscular Re-education     Therapeutic Activity     Therapeutic Exercises 27 Review and progression of HEP, nustep warmup   Gait training     Modality__________________                Total 27    Blank areas are intentional and mean the treatment did not include  these items.       Gurdeep RAMÍREZ, PT  10/11/2019

## 2021-06-02 NOTE — PROGRESS NOTES
Assessment:   Dalila Malone is a 44 y.o. y.o. male with past medical history significant for nicotine use who presents today for follow-up regarding chronic right low back pain extending into the right upper buttock which began as result of a work injury on February 11, 2019.   Determining etiology of pain has been challenging.  Patient did not have any relief after a right L4-5, L5-S1 transforaminal epidural steroid injection on April 9, 2019.  He then underwent a right sacroiliac joint injection on May 27, 2018 which provided 90% relief of his pain but only lasted 2 to 3 days.  Most recently patient tried a right L4-5, L5-S1 facet joint injection on July 30, 2019 which provided 80% relief of the pain but only lasted 1 week.  Since I last saw the patient he has made progress with physical therapy.  This is actually the best I have seen him look in many months.   -Patient has not making progress like a typical patient would make with the same type of problem.  There have been some yellow flags in regards to his pain which seems out of proportion to exam, Francisco signs on previous exams, failure to improve with physical therapy, and frequent flareups of pain requiring time off of work.  He has initiated occupational therapy treatment with Dinorah Durbin for neuromuscular reeducation.  I have also recommended behavioral health, but that is pending authorization from Worker's Compensation.  In my opinion, this patient should work with occupational therapy and possibly also behavioral therapy before pursuing interventional pain management.  If patient completes physical therapy, occupational therapy and worked with behavioral health and he continues to have significant pain, we could consider radio frequency ablation.  - The patient had an independent medical exam and the examiner indicated that he could return to full duty work.  The patient is now working with an .  He had a hearing last week and he states that his  benefits are reinstated.  It is unclear if this is just his monetary benefits or coverage for medical.     Plan:     A shared decision making plan was used.  The patient's values and choices were respected.  The following represents what was discussed and decided upon by the physician assistant and the patient.  The patient's C was present for today's visit.    1.  DIAGNOSTIC TESTS: I reviewed the MRI lumbar spine, MRI pelvis, EMG right lower extremity.  No further diagnostic tests were ordered.    2.  PHYSICAL THERAPY: Patient is currently in physical [facility].  He has had 3 sessions so far.  He had previously tried physical therapy at 3 different facilities prior to this.  He will continue with the [facility] physical therapy program.  -Patient is also in occupational therapy with Dinorah Durbin.  He has had 4 sessions of far.  He will continue with occupational therapy.    3.  MEDICATIONS:   -I refilled the patient's lidocaine patches.  He states that he had to pay out of pocket for these last month, but now that he states he has benefits again, he would like to try to get them through his workers comp.  -Patient can continue using gabapentin 1200 mg 3 times daily.  -Patient can continue using Tylenol as needed.  -Patient ran out of cyclobenzaprine but states that he is doing okay without it.  -Patient can use ibuprofen as needed.    4.  INTERVENTIONS: No interventions were ordered.  If patient completes physical therapy, occupational therapy, and possibly behavioral health and continues have significant pain, I recommend medial branch blocks as work-up toward radiofrequency ablation.    5.  WORK: I provided an updated work note for the patient.  Patient is not currently working.  I provided a work letter loosening his restrictions slightly.  See letter tab for details.  He could lift up to 20 pounds, avoid repetitive bending and twisting at the waist, must feel alternate sit to stand every 30  minutes, 6-hour shifts.    6.  REFERRALS:   -I entered another referral for behavioral health.  This had not been authorized by Worker's Comp. previously, but we will try again now that benefits have been reinstated.    7.  FOLLOW-UP: I will see the patient back in clinic in 4 weeks to monitor his progress with physical therapy.  If he has questions or concerns in the meantime, he should not hesitate to call.    Subjective:     Dalila Malone is a 44 y.o. male who presents today for follow-up regarding chronic right low back pain extending into the right buttock which began as result of a work injury.  I last saw the patient on August September 11, 2019.  At that time I recommended patient continue working with physical therapy and occupational therapy.  I also entered a new referral for the patient to see behavioral health.  Since I saw the patient he has had 3 sessions of medics physical therapy and one additional session with Dinorah Durbin.  Behavioral health has not been authorized.  Patient denies improvement in his pain initially, but during our conversation he admits that there are aspects that have improved.    Patient complains of right-sided low back pain.  Pain extends to the right buttock.  He is no longer having pain in the hip.  He states that occasionally he feels numbness and tingling in the right anterolateral thigh, but that has improved significantly.  He denies weakness.  He rates his pain today as a 4 or 5 out of 10.  At its worst it is an 8-10.  At its best it is a 3 out of 10.  Pain tends to be aggravated at night.  He states that he still wakes 1 or 2 times per night.  This is an improvement.  He is awake every 1-2 hours.  Pain is alleviated with laying in a fetal position.    Patient is currently in physical therapy [facility] the [facility] center.  He has had 3 sessions of far.  He is seeing Dinorah Durbin for occupational therapy.  He has had 4 sessions of far..  Patient is using gabapentin 1200 mg 3 times daily.   He takes Tylenol as needed.  He uses ibuprofen as needed.  He ran out of cyclobenzaprine but feels that he is doing okay without it.  He is no longer using diclofenac.  He has been using over-the-counter lidocaine patches.  The prescription lidocaine patches were not authorized by his insurance last month.    Past medical history is reviewed and is unchanged.    Patient is not currently working.  Patient is working with an .  He had a hearing last week and states that his benefits have been reinstated.  It is unclear if this is medical benefits are just monitoring benefits.    Review of Systems:  Positive for numbness/tingling, pain worse at night, trip/stumble/falls.  Negative for weakness, loss of bowel/bladder control, inability to urinate, headache, difficulty swallowing, difficulty with hand skills, fevers, unintentional weight loss.     Objective:   CONSTITUTIONAL:  Vital signs as above.  No acute distress.  The patient is well nourished and well groomed.    PSYCHIATRIC:  The patient is awake, alert, oriented to person, place and time.  The patient is answering questions appropriately with clear speech.  Normal affect.  HEENT: Normocephalic, atraumatic.  Sclera clear.    SKIN:  Skin over the face, posterior torso, bilateral upper and lower extremities is clean, dry, intact without rashes.  MUSCULOSKELETAL:  Gait is antalgic, favoring the right (gait is significantly improved compared with previous evaluations).  He uses a cane for assistance.  Tenderness to palpation spinous processes L4-L5, right lower lumbar paraspinous muscles at L4 and L5, mild tenderness palpation right sacral area.    The patient has 5/5 strength for the bilateral hip flexors, knee flexors/extensors, ankle dorsiflexors/plantar flexors, ankle evertors/invertors.  Lumbar flexion moderately restricted.  Lumbar extension severely restricted.  Lumbar facet loading maneuvers reproduce low back pain on the right.  NEUROLOGICAL: 1-2+  patellar, achilles reflexes which are symmetric bilaterally.  No ankle clonus bilaterally.  Sensation to light touch is intact in the bilateral L4, L5, and S1 dermatomes.       RESULTS:    EMG right lower extremity dated July 8, 2019:  Comment NCS: Normal study  1.  Normal nerve conduction studies right lower extremity.     Comment EMG: Normal study  1.  Normal needle EMG right lower extremity.     Interpretation: Normal study     1. There is no electrodiagnostic evidence of lumbosacral radiculopathy, lumbosacral plexopathy, or focal neuropathy in the right lower extremity.     I reviewed the MRI pelvis from [facility] imaging dated June 7, 2019.  This shows normal sacroiliac joints.  No evidence for degenerative or inflammatory sacroiliitis.  No fracture or contusion.  No osseous lesion.  No effusion or intra-articular body in the hip joints.  No tendon tear, tendinosis, muscular atrophy, or edema.  No intramuscular mass or hematoma.  There is no edema, mass, fluid collection or abscess in the soft tissues.  No evidence for trochanteric bursitis.  There is no evidence for pelvic mass, free fluid, lymphadenopathy, or inflammatory changes.  Exam is unremarkable.  Please see report for further details.     I reviewed the updated MRI lumbar spine from [facility] imaging dated April 25, 2019.  This shows a shallow disc bulge at L1-L2 without neural impingement.  There is a shallow disc bulge at L2-3 with mild facet arthropathy but the spinal canal and neuroforaminal are patent.  There is minimal disc bulge at L3-4 with mild facet hypertrophy, no neural impingement.  At L4-5 there is a shallow broad-based disc protrusion.  Spinal canal is patent.  The neural foramen are patent.  At L5-S1 there is mild to moderate facet hypertrophy bilaterally.  No impingement.  Please see report for further details.      I reviewed the MRI lumbar spine from [facility] imaging dated February 22, 2019.  At L2-3 there is a disc bulge but no  neural compromise.  At L4-5 there is degeneration and a generalized disc bulge which results in mild bilateral foraminal stenosis.  There is also facet arthropathy in the lower lumbar levels and degenerative change in the bilateral SI joints.

## 2021-06-02 NOTE — PROGRESS NOTES
[facility]   [facility]-Pelvic Initial Evaluation    Patient Name: Dalila Malone  Date of evaluation: 10/7/2019  Referral Diagnosis:   Chronic right-sided low back pain with right-sided sciatica [M54.41, G89.29]  - Primary       Lumbar radicular pain [M54.16]       Referring provider: Lydia Millan PA-C  Visit Diagnosis:     ICD-10-CM    1. Chronic right-sided low back pain without sciatica M54.5     G89.29    2. Sacroiliac joint pain M53.3        Assessment:        Dalila Malone is a 44 y.o. male who presents to therapy today with chief complaints of chronic low back pain stemming from work comp injury on Feruary 11th, 2019. The patient does report very slow improvements in pain lately. He has been through multiple rounds of PT and injections with only minimal or short term benefit. Currently limited in ability to work, do activities around the house, walk any prolonged period of time. With examination, the patient demonstrates antalgic gait with cane, significant lumbar ROM in all motions, tight HS with possible + neural tension on right, + SI pain provocation testing, but normal myotome/dermatome screens. The patient will likely benefit from skilled PT to improve his mobility, strength, pain, gait, and overall function.    POC and pathology of condition were reviewed with patient.  Pt. is in agreement with the Plan of Care  A Home Exercise Program (HEP) was initiated today.  Pt. was instructed in exercises by PT and patient was given a handout with detailed instructions.    Goals:  Pt. will be independent with home exercise program in : 4 weeks  Pt. will report decreased intensity, frequency of : Pain;in 6 weeks;Comment  Comment:: 50%    Pt will: feel comfortable to return to work without increased pain in 12 weeks:  Pt will: be able to do more activity including walking/exercise 5-10 minutes in 3-4 weeks without increased pain:  Pt will: improve SLR mobility to 60 degrees on the right in 4 weeks for  improved ability to bend in 6 weeks:      Patient's expectations/goals are realistic.    Barriers to Learning or Achieving Goals:  Chronicity of the problem.       Plan / Patient Instructions:        Plan of Care:   Communication with: Referral Source  Patient Related Instruction: Nature of Condition;Treatment plan and rationale;Self Care instruction;Basis of treatment;Expected outcome;Body mechanics;Posture;Precautions;Next steps  Times per Week: 1-2  Number of Weeks: 12  Number of Visits: 16 (If doing MEDX, may not do 2X/week if self pay)  Discharge Planning: pt will meet all PT goals or reach a plateau with PT  Precautions / Restrictions : none  Therapeutic Exercise: ROM;Strengthening;Stretching  Neuromuscular Reeducation: kinesio tape;posture;core;TNE;balance/proprioception  Manual Therapy: soft tissue mobilization;myofascial release;joint mobilization;muscle energy  Modalities: electrical stimulation;TENS  Gait Training: to normalize mechanics      Plan for next visit: review HEP, gentle stretching exercises, functional strengthening and mobility, possible MT if tolerating well for SI, glut, lumbar paraspinals.      Subjective:         Social information:   Occupation: Previously    Work Status:Unable to work due to symptoms    History of Present Illness:      Pain started in February 11th 2019, went to pull a dock plate chain, when he got up he had a pinch of pain. Did some paperwork following this, after getting back up back stiffened up a lot. Prior to this no back pain. He did get some tingling in March/April on the right. He has been through multiple rounds of injections, without any long lasting benefit. He has also been through 2 rounds of traditional PT and 1 round of aquatic PT. He is reporting some improvement in his pain more recently in terms of intensity. Also working with Dinorah BEDOYA, for pain neuroscience education.  Pain Described as sharp and achy in the low back midline and then off  to the right. When pain spikes, he can get shooting tingling to thigh and lateral leg. No longer having any tingling in the legs. No weakness in the legs. Denies falls besides on slip on the stairs. Denies change in bowel/bladder with one episode of bladder loss.  Worse with helping with cleaning around the house (moppping/sleeping), washing the dishes, twisting quickly, bending, lifting.  Better with leaning onto something, heat, Biofreeze, medication helped initially.    Regular activity- significantly limited- walks 3 minutes daily.    Pain Ratin  Pain rating at best: 4  Pain rating at worst: 7  Pain description: aching, dull, pain, sharp and tingling    Functional limitations are described as occurring with:   Bending, lifting, stairs, walking, reaching, dressing, kneeling/squatting, yard/housework, washing, meal prep, carrying objects.       Objective:      Note: Items left blank indicates the item was not performed or not indicated at the time of the evaluation.    Patient Outcome Measures :    Modified Oswestry Low Back Pain Disablity Questionnaire  in %: 64     Scores range from 0-100%, where a score of 0% represents minimal pain and maximal function. The minimal clinically important difference is a score reduction of 12%.    Examination  1. Chronic right-sided low back pain without sciatica     2. Sacroiliac joint pain       Precautions/Restrictions: None  Involved side: Right  Posture Observation:      Fair, slight lateral shift hips to the right.    Lumbar ROM:    Date: 10/7/19     *Indicate scale AROM AROM AROM   Lumbar Flexion 47 cm back     Lumbar Extension No mobility      Right Left Right Left Right Left   Lumbar Sidebending Major limit Major limit       Lumbar Rotation Major limit Major limit       Thoracic Rotation           Lower Extremity Strength:     Date: 10/7/19     LE strength/5 Right Left Right Left Right Left   Hip Flexion (L1-3) 5 5       Hip Extension (L5-S1)         Hip Abduction  (L4-5)         Hip Adduction (L2-3)         Hip External Rotation         Hip Internal Rotation         Knee Extension (L3-4) 5 5       Knee Flexion 5 5       Ankle Dorsiflexion (L4-5) 5 5       Great Toe Extension (L5) 5 5       Ankle Plantar flexion (S1) 5 5       Abdominals        Sensation    WNL to lt touch sensation screen       Reflex Testing  Lumbar Dermatomes Right Left UE Reflexes Right Left   Iliac Crest and Groin (L1)   Biceps (C5-6)     Anterior Medial Thigh (L2)   Brachioradialis (C5-6)     Anterior Thigh, Medial Epicondyle Femur (L3)   Triceps (C7-8)     Lateral Thigh, Anterior Knee, Medial Leg/Malleolus (L4)   Debbie s test     Lateral Leg, Dorsal Foot (L5)   LE Reflexes     Lateral Foot (S1)   Patellar (L3-4)     Posterior Leg (S2)   Achilles (S1-2)     Other:   Babinski Response       Palpation: significant tenderness to gluts R>L, lumbar paraspinals R>L. PSIS tenderness.    Lumbar Special Tests:     Lumbar Special Tests Right Left SI Tests Right  Left   Quadrant test   SI Compression     Straight leg raise 35 - 65 -  SI Distraction     Crossover response   POSH Test + +   Slump - - Sacral Thrust + +   Sit-up test  FADIR + -   Trunk extensor endurance test  Resisted Abduction + -   Prone instability test  SRIKANTH + +   Pubic shotgun  Other:       Repeated Motion Testing:  Does not centralize  Does not peripheralize    Passive Mobility - Joint Integrity:  Not tested    Treatment Today     TREATMENT MINUTES COMMENTS   Evaluation 35 Low complexity   Self-care/ Home management     Manual therapy     Neuromuscular Re-education 9 Education on alarm system of nervous system, nerve sensitization, pain with exercise   Therapeutic Activity     Therapeutic Exercises 15 Education on and trial of HEP, handout given.   Gait training     Modality__________________                Total 59    Blank areas are intentional and mean the treatment did not include these items.     PT Evaluation Code: (Please list  factors)  Patient History/Comorbidities:   Past Medical History:   Diagnosis Date     Medical history non-contributory    Examination: see objective  Clinical Presentation: stable  Clinical Decision Making: low    Patient History/  Comorbidities Examination  (body structures and functions, activity limitations, and/or participation restrictions) Clinical Presentation Clinical Decision Making (Complexity)   No documented Comorbidities or personal factors 1-2 Elements Stable and/or uncomplicated Low   1-2 documented comorbidities or personal factor 3 Elements Evolving clinical presentation with changing characteristics Moderate   3-4 documented comorbidities or personal factors 4 or more Unstable and unpredictable High            Gurdeep RAMÍREZ  10/7/2019  10:23 AM

## 2021-06-02 NOTE — PROGRESS NOTES
[facility] Daily Progress     Patient Name: Dalila Malone  Date: 10/9/2019  Visit #: 2  PTA visit #:  -  Referral Diagnosis:   Chronic right-sided low back pain with right-sided sciatica [M54.41, G89.29]  - Primary       Lumbar radicular pain [M54.16]       Referring provider: Lydia Millan PA-C  Visit Diagnosis:     ICD-10-CM    1. Chronic right-sided low back pain without sciatica M54.5     G89.29    2. Sacroiliac joint pain M53.3      Dalila Malone is a 44 y.o. male who presents to therapy today with chief complaints of chronic low back pain stemming from work comp injury on Feruary 11th, 2019. The patient does report very slow improvements in pain lately. He has been through multiple rounds of PT and injections with only minimal or short term benefit. Currently limited in ability to work, do activities around the house, walk any prolonged period of time. With examination, the patient demonstrates antalgic gait with cane, significant lumbar ROM in all motions, tight HS with possible + neural tension on right, + SI pain provocation testing, but normal myotome/dermatome screens. The patient will likely benefit from skilled PT to improve his mobility, strength, pain, gait, and overall function.    Assessment:     HEP/POC compliance is  good .     Patient tolerating PT exercises well, with increased in temporary pain, that returns to baseline in 5-10 minutes. Patient given encouragement today about the normalcy of this.    Patient encouraged to use the NuStep at the gym for aerobic exercise. Education provided on proper sleep hygiene and strategies to fall back asleep including gentle stretches to relieve pain.     Patient will benefit from further PT to give guidance on exercise and activity progression, with extra education on pain response.    Goal Status:  Pt. will be independent with home exercise program in : 4 weeks  Pt. will report decreased intensity, frequency of : Pain;in 6  weeks;Comment  Comment:: 50%    Pt will: feel comfortable to return to work without increased pain in 12 weeks:  Pt will: be able to do more activity including walking/exercise 5-10 minutes in 3-4 weeks without increased pain:  Pt will: improve SLR mobility to 60 degrees on the right in 4 weeks for improved ability to bend in 6 weeks:      Plan / Patient Education:     Continue with initial plan of care.     Plan for next visit: review HEP, gentle stretching exercises, functional strengthening and mobility, possible MT if tolerating well for SI, glut, lumbar paraspinals.     Subjective:     Pain Rating: Not Rated today.    Patient felt sore following the initial evaluation. He reports that he has a TENS unit at home, but has not used it since early in his recovery. Patient reports that he is getting 5-6 hours of sleep per night with difficulty falling back to sleep after waking up due to pain.     Objective:     NuStep, hamstring stretch, and partial squat added during this session.     Cues provided to avoid valsalva during strengthening exercises.     Patient flexed forward and laterally to the right with gait, using SEC    Exercises:  Exercise #1: SKTC X 10  Comment #1: pelvic tilting X 10   Exercise #2: LTR X 10   Comment #2: SI Shotgun MET X 10  Exercise #3: NuStep x5min level 3  Comment #3: supine hamstring stretch x10  Exercise #4: partial squat with UE support x10      Treatment Today     TREATMENT MINUTES COMMENTS   Evaluation     Self-care/ Home management     Manual therapy     Neuromuscular Re-education 10 Education on breathing with parasympathetic/sympathetic NS, Sleep hygiene for pain management.   Therapeutic Activity     Therapeutic Exercises 18 Review and progression of HEP, nustep warmup   Gait training     Modality__________________                Total 28    Blank areas are intentional and mean the treatment did not include these items.       Gurdeep RAMÍREZ, PT  10/9/2019

## 2021-06-02 NOTE — PROGRESS NOTES
[facility] Daily Progress     Patient Name: Dalila Malone  Date: 11/1/2019  Visit #: 4  PTA visit #:  -  Referral Diagnosis:   Chronic right-sided low back pain with right-sided sciatica [M54.41, G89.29]  - Primary       Lumbar radicular pain [M54.16]       Referring provider: Lydia Millan PA-C  Visit Diagnosis:     ICD-10-CM    1. Chronic right-sided low back pain without sciatica M54.5     G89.29    2. Sacroiliac joint pain M53.3      Dalila Malone is a 44 y.o. male who presents to therapy today with chief complaints of chronic low back pain stemming from work comp injury on Feruary 11th, 2019. The patient does report very slow improvements in pain lately. He has been through multiple rounds of PT and injections with only minimal or short term benefit. Currently limited in ability to work, do activities around the house, walk any prolonged period of time. With examination, the patient demonstrates antalgic gait with cane, significant lumbar ROM in all motions, tight HS with possible + neural tension on right, + SI pain provocation testing, but normal myotome/dermatome screens. The patient will likely benefit from skilled PT to improve his mobility, strength, pain, gait, and overall function.    Assessment:     HEP/POC compliance is  fair .     Patient reports flare of pain after trying to do his squats without UE support 1.5 weeks ago. Prior to this, the patient felt he was starting to feel some improvements, which is why he felt okay to squat without UE support.     The patient demonstrate significant guarding and slowed gait again today. He describes fear of exercises, movement, and making things worse. Discussed in session today importance of moving frequently and stretching. Guarding and pain reduced today with RLE long axis distraction, once patient was able to relax and limit guarding. He did have improved gait out of clinic, though reports pain similar to when starting the session.     Patient  will benefit from further PT to give guidance on exercise and activity progression, with extra education on pain response.    Goal Status:  Pt. will be independent with home exercise program in : 4 weeks  Pt. will report decreased intensity, frequency of : Pain;in 6 weeks;Comment  Comment:: 50%    Pt will: feel comfortable to return to work without increased pain in 12 weeks:  Pt will: be able to do more activity including walking/exercise 5-10 minutes in 3-4 weeks without increased pain:  Pt will: improve SLR mobility to 60 degrees on the right in 4 weeks for improved ability to bend in 6 weeks:      Plan / Patient Education:     Continue with initial plan of care.     Plan for next visit: review HEP, gentle stretching exercises, functional strengthening and mobility, possible MT if tolerating well for SI, glut, lumbar paraspinals.     Subjective:     Pain Rating: Same as previous.    Still having pain worse in the right low back, tried letting go of UE support with squats, felt a sharp pain. No leg pain.     Objective:     Patient flexed forward and laterally to the right with gait, using SEC, significantly slowed gait speed. Gait speed significantly improved following MT.    Discussed importance of return to stretching exercises.    Decreased pain with gentle RLE distraction.    Exercises:  Exercise #1: SKTC X 10  Comment #1: pelvic tilting X 10   Exercise #2: LTR X 10   Comment #2: SI Shotgun MET X 10  Exercise #3: NuStep x5min level 3  Comment #3: supine hamstring stretch x10  Exercise #4: partial squat with UE support x10  Comment #4: standing quad stretch, strap assisted x10  Exercise #5: leg swings x10      Treatment Today     TREATMENT MINUTES COMMENTS   Evaluation     Self-care/ Home management     Manual therapy 18 MFR to QL, lumbar parspinals, sacral offloading technique in L SL.  Grade I :L SL rotational lumbosacral mobilizations.  RLE long axis distraction 3 X 45 seconds.   Neuromuscular Re-education      Therapeutic Activity     Therapeutic Exercises 9 Review and progression of HEP, nustep warmup   Gait training     Modality__________________                Total 27    Blank areas are intentional and mean the treatment did not include these items.       Gurdeep RAMÍREZ, PT  11/1/2019

## 2021-06-03 VITALS — WEIGHT: 229 LBS | BODY MASS INDEX: 35.52 KG/M2

## 2021-06-03 VITALS — WEIGHT: 226 LBS | BODY MASS INDEX: 35.4 KG/M2

## 2021-06-03 VITALS — WEIGHT: 225 LBS | BODY MASS INDEX: 35.24 KG/M2

## 2021-06-03 VITALS — WEIGHT: 227.8 LBS | BODY MASS INDEX: 35.75 KG/M2 | HEIGHT: 67 IN

## 2021-06-03 VITALS — BODY MASS INDEX: 35.24 KG/M2 | WEIGHT: 225 LBS

## 2021-06-03 VITALS — BODY MASS INDEX: 35.46 KG/M2 | WEIGHT: 226.4 LBS

## 2021-06-03 VITALS — BODY MASS INDEX: 35.31 KG/M2 | HEIGHT: 67 IN | WEIGHT: 225 LBS

## 2021-06-03 VITALS — WEIGHT: 226 LBS | BODY MASS INDEX: 35.47 KG/M2 | HEIGHT: 67 IN

## 2021-06-03 VITALS — BODY MASS INDEX: 34.91 KG/M2 | WEIGHT: 225 LBS

## 2021-06-03 VITALS — BODY MASS INDEX: 35.52 KG/M2 | WEIGHT: 229 LBS

## 2021-06-03 NOTE — TELEPHONE ENCOUNTER
Return call from [facility]. Recommended treatment plan explained to her. Stated understanding and appreciation for call.

## 2021-06-03 NOTE — PROGRESS NOTES
[facility] Daily Progress     Patient Name: Dalila Malone  Date: 11/8/2019  Visit #: 5  PTA visit #:  -  Referral Diagnosis:   Chronic right-sided low back pain with right-sided sciatica [M54.41, G89.29]  - Primary       Lumbar radicular pain [M54.16]       Referring provider: Lydia Millan PA-C  Visit Diagnosis:     ICD-10-CM    1. Chronic right-sided low back pain without sciatica M54.5     G89.29    2. Sacroiliac joint pain M53.3      Dalila Malone is a 44 y.o. male who presents to therapy today with chief complaints of chronic low back pain stemming from work comp injury on Feruary 11th, 2019. The patient does report very slow improvements in pain lately. He has been through multiple rounds of PT and injections with only minimal or short term benefit. Currently limited in ability to work, do activities around the house, walk any prolonged period of time. With examination, the patient demonstrates antalgic gait with cane, significant lumbar ROM in all motions, tight HS with possible + neural tension on right, + SI pain provocation testing, but normal myotome/dermatome screens. The patient will likely benefit from skilled PT to improve his mobility, strength, pain, gait, and overall function.    Assessment:     HEP/POC compliance is  fair .     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 He reports feeling a pinch and being afraid of the pain, so he stopped all activity and held all exercises. Discussed with the patient again today TNE below about how pain operates and difference between hurt and harm, and that pain does not equate to tissue damage.    The patient does have forward flexed posture, worked today on trying to encourage extension mobility with Heu. He reports significant fear avoidance of this. By the end of the session, was doing better laying in prone on pillows with less guarding.    The patient demonstrate significant guarding and slowed gait again today. He  describes fear of exercises, movement, and making things worse.     Patient will benefit from further PT to give guidance on exercise and activity progression, with extra education on pain response.    Goal Status:  Pt. will be independent with home exercise program in : 4 weeks  Pt. will report decreased intensity, frequency of : Pain;in 6 weeks;Comment  Comment:: 50%    Pt will: feel comfortable to return to work without increased pain in 12 weeks:  Pt will: be able to do more activity including walking/exercise 5-10 minutes in 3-4 weeks without increased pain:  Pt will: improve SLR mobility to 60 degrees on the right in 4 weeks for improved ability to bend in 6 weeks:      Plan / Patient Education:     Continue with initial plan of care.     Plan for next visit: review HEP, gentle stretching exercises, continue prone positioning, TNE functional strengthening and mobility    Ppossible MT if tolerating well for SI, glut, lumbar paraspinals.     Subjective:     Pain Ratin/10    During the side to side stretch he feels that pain increased with a little pinch. He feels pain increased after this so he stopped all exercises for a couple of days.    Objective:     2. Sensitive nerves.  Patient educated on the concept of the nervous system as the bodies alarm system, and the role of nociception to warn the body of danger. Peripheral nerve sensitization, hyperalgesia and allodynia were explained using metaphors to promote deep learning.   4. Spreading pain (Nosy neighbors). Patient educated regarding spreading pain symptoms, and that feeling pain in adjacent areas of the body does not indicate definite tissue injury. Hyperalgesia, immune responses and central sensitization topics were introduced using metaphors to promote deep learning.  5. Calming sensitive nerves. Patient was educated regarding endogenous mechanisms and strategies to increase the brain's production of chemicals which decrease pain, such as aerobic  exercise and improved pain knowledge. The concepts of pacing, graded exposure, 'sore but safe', and 'hurt does not equal harm' were discussed. Sleep hygiene and diaphragmatic breathing topics introduced to help calm the nervous system and reduce stress.  6. Pain comes from the brain. Patient was educated on the concept of pain as an output of the brain, including nociception versus pain, inhibition and facilitation, and threat value using metaphors to promote deep learning.  10. Tissues. Patient educated about the relationship between tissue injury and pain, and that pain is an output by the brain. Included was the principle that tissue injury does not have to hurt, and that you can have pain without having tissue injury, particularly with a hypersensitive nervous system.    Patient flexed forward and laterally to the right with gait, using SEC, significantly slowed gait speed.    SLR 52 degrees (previously 35 deg)    Exercises:  Exercise #1: SKTC X 10  Comment #1: pelvic tilting X 10   Exercise #2: LTR X 10   Comment #2: SI Shotgun MET X 10  Exercise #3: NuStep x5min level 3  Comment #3: supine hamstring stretch x10  Exercise #4: partial squat with UE support x10  Comment #4: standing quad stretch, strap assisted x10  Exercise #5: Cat/Cow X 5  Comment #5: Prone laying on 3 pillows- focus on feeling other body areas, deep breathing.      Treatment Today     TREATMENT MINUTES COMMENTS   Evaluation     Self-care/ Home management     Manual therapy Not done today MFR to QL, lumbar parspinals, sacral offloading technique in L SL.  Grade I :L SL rotational lumbosacral mobilizations.  RLE long axis distraction 3 X 45 seconds.   Neuromuscular Re-education 10 See TNE above, done during various exercises as well.   Therapeutic Activity     Therapeutic Exercises 19 Review and progression of HEP, nustep warmup   Gait training     Modality__________________                Total 29    Blank areas are intentional and mean the  treatment did not include these items.       Gurdeep RAMÍREZ, PT  11/8/2019

## 2021-06-03 NOTE — TELEPHONE ENCOUNTER
Phone call to pt to discuss the recommended treatment plan. Information shared with patient. Stated understanding. He gave the name and phone number of his [facility] to call and review information with as well. Name is Clara @ [phone].

## 2021-06-03 NOTE — PROGRESS NOTES
[facility] Daily Progress     Patient Name: Dalila Malone  Date: 11/25/2019  Visit #: 6  PTA visit #:  -  Referral Diagnosis:   Chronic right-sided low back pain with right-sided sciatica [M54.41, G89.29]  - Primary       Lumbar radicular pain [M54.16]       Referring provider: Lydia Millan PA-C  Visit Diagnosis:     ICD-10-CM    1. Chronic right-sided low back pain without sciatica M54.5     G89.29    2. Sacroiliac joint pain M53.3      Dalila Malone is a 44 y.o. male who presents to therapy today with chief complaints of chronic low back pain stemming from work comp injury on Feruary 11th, 2019. The patient does report very slow improvements in pain lately. He has been through multiple rounds of PT and injections with only minimal or short term benefit. Currently limited in ability to work, do activities around the house, walk any prolonged period of time. With examination, the patient demonstrates antalgic gait with cane, significant lumbar ROM in all motions, tight HS with possible + neural tension on right, + SI pain provocation testing, but normal myotome/dermatome screens. The patient will likely benefit from skilled PT to improve his mobility, strength, pain, gait, and overall function.    Assessment:     HEP/POC compliance is  fair .     Patient showing improved awareness of how his fear is likely limiting his progress, stating that he knows this is his barrier to making progress. Discussed today goals for the week of increasing walking and re-starting squats.     Discussed with the patient again today TNE below about how pain operates and difference between hurt and harm, and that pain does not equate to tissue damage. Patient was given Why do I hurt book to re-read and process through his pain processing.    The patient demonstrate significant guarding and slowed gait again today. Discussed how his flexed forward posture may fatigue his back and lead to the burning feel that he gets.    Patient  will benefit from further PT to give guidance on exercise and activity progression, with extra education on pain response.    Goal Status:  Pt. will be independent with home exercise program in : 4 weeks  Pt. will report decreased intensity, frequency of : Pain;in 6 weeks;Comment  Comment:: 50%    Pt will: feel comfortable to return to work without increased pain in 12 weeks:  Pt will: be able to do more activity including walking/exercise 5-10 minutes in 3-4 weeks without increased pain:  Pt will: improve SLR mobility to 60 degrees on the right in 4 weeks for improved ability to bend in 6 weeks:      Plan / Patient Education:     Continue with initial plan of care.     Plan for next visit: review HEP, gentle stretching exercises, continue prone positioning, TNE functional strengthening and mobility. Check on goals.    Ppossible MT if tolerating well for SI, glut, lumbar paraspinals.     Subjective:     Pain Ratin/10    Hip is getting better, it comes and goes every few days now. Back is still in constant pain. Walking feels like it is getting better. Avoiding the squats, but has been doing the stairs more often, 2 X 3 flights of stairs. Right now walking only 3 minutes and then starts to get burning pain.    Objective:     Patient given Why do I hurt book to take home and read.    Discussed goals of walking 4 min by the end of the week and doing squat/sit to stands again from a bed to walker to decrease threat.    2. Sensitive nerves.  Patient educated on the concept of the nervous system as the bodies alarm system, and the role of nociception to warn the body of danger. Peripheral nerve sensitization, hyperalgesia and allodynia were explained using metaphors to promote deep learning.   4. Spreading pain (Nosy neighbors). Patient educated regarding spreading pain symptoms, and that feeling pain in adjacent areas of the body does not indicate definite tissue injury. Hyperalgesia, immune responses and central  sensitization topics were introduced using metaphors to promote deep learning.  5. Calming sensitive nerves. Patient was educated regarding endogenous mechanisms and strategies to increase the brain's production of chemicals which decrease pain, such as aerobic exercise and improved pain knowledge. The concepts of pacing, graded exposure, 'sore but safe', and 'hurt does not equal harm' were discussed. Sleep hygiene and diaphragmatic breathing topics introduced to help calm the nervous system and reduce stress.  6. Pain comes from the brain. Patient was educated on the concept of pain as an output of the brain, including nociception versus pain, inhibition and facilitation, and threat value using metaphors to promote deep learning.  10. Tissues. Patient educated about the relationship between tissue injury and pain, and that pain is an output by the brain. Included was the principle that tissue injury does not have to hurt, and that you can have pain without having tissue injury, particularly with a hypersensitive nervous system.    Patient flexed forward and laterally to the right with gait, using SEC, significantly slowed gait speed.    Exercises:  Exercise #1: SKTC X 10  Comment #1: pelvic tilting X 10   Exercise #2: LTR X 10   Comment #2: SI Shotgun MET X 10  Exercise #3: NuStep x5min level 3  Comment #3: supine hamstring stretch x10  Exercise #4: partial squat with UE support x10  Comment #4: standing quad stretch, strap assisted x10  Exercise #5: Gait on Treadmill X 3 min Speed 0.7 mph  Comment #5: Prone laying on 3 pillows- focus on feeling other body areas, deep breathing.      Treatment Today     TREATMENT MINUTES COMMENTS   Evaluation     Self-care/ Home management     Manual therapy Not done today MFR to QL, lumbar parspinals, sacral offloading technique in L SL.  Grade I :L SL rotational lumbosacral mobilizations.  RLE long axis distraction 3 X 45 seconds.   Neuromuscular Re-education 10 See TNE above,  done during various exercises as well.   Therapeutic Activity     Therapeutic Exercises 19 Review and progression of HEP, nustep warmup   Gait training     Modality__________________                Total 29    Blank areas are intentional and mean the treatment did not include these items.       Gurdeep RAMÍREZ, PT  11/25/2019

## 2021-06-03 NOTE — PROGRESS NOTES
Assessment:   Dalila Malone is a 44 y.o. y.o. male with past medical history significant for nicotine use who presents today for follow-up regarding chronic right low back pain extending into the right upper buttock which began as result of a work injury on February 11, 2019.   Determining etiology of pain has been challenging.  At this point he appears to be most symptomatic from lumbar facet arthropathy.  Patient did not have any relief after a right L4-5, L5-S1 transforaminal epidural steroid injection on April 9, 2019.  He then underwent a right sacroiliac joint injection on May 27, 2018 which provided 90% relief of his pain but only lasted 2 to 3 days.  Most recently patient tried a right L4-5, L5-S1 facet joint injection on July 30, 2019 which provided 80% relief of the pain but only lasted 1 week.  Patient had a flareup of pain 3 weeks ago when doing squats for his home exercise program, but otherwise I do feel that the patient is making slow progress with physical therapy.  He is neurologically intact on exam today.       Plan:     A shared decision making plan was used.  The patient's values and choices were respected.  The following represents what was discussed and decided upon by the physician assistant and the patient.  The patient's C was present for today's visit.    1.  DIAGNOSTIC TESTS: I reviewed the MRI lumbar spine, MRI pelvis, EMG right lower extremity.  No further diagnostic tests were ordered.    2.  PHYSICAL THERAPY: Patient is [facility].  He has had 5 sessions so far.  He had previously tried physical therapy at 3 different facilities prior to this.  He will continue with the [facility] physical therapy program.  -Patient is also in occupational therapy with Dinorah Durbin.  He has had 4 sessions of far.  He will continue with occupational therapy.    3.  MEDICATIONS:   -Patient states that he stopped gabapentin for 2 days and did not notice any worsening  pain.  He went back on the medication and did not notice any improvement in his pain.  I recommended he wean off of this medication.  I provided a prescription for him to wean off of the medication.  He will eliminate 1 capsule/day over the next 12 days.  If pain does worsen at a lower dose he should resume the lowest possible dose.  -Patient can continue using Tylenol as needed.  -Patient can use ibuprofen as needed.    4.  INTERVENTIONS: I am going to discuss this case with Dr. Garces.  She and I had previously discussed that he needs to trial additional physical therapy and occupational therapy first before pursuing additional interventional pain management.  He has been participating in both of these.  He has seen some progress.  If he is can have additional interventional pain management, I recommend medial branch blocks as work-up toward radiofrequency ablation.    5.  WORK: I provided an updated work note for the patient.  Patient is not currently working.  I provided a work letter loosening his restrictions slightly.  See letter tab for details.  He could lift up to 25 pounds, avoid repetitive bending and twisting at the waist, must feel alternate sit to stand every 15 minutes, 6-hour shifts.  -I told the patient that he may also need a functional capacity evaluation to determine permanent restrictions.    6.  FOLLOW-UP: A nurse to call the patient with an update once I discussed this case with Dr. Garces.  I will see the patient back in clinic in 4 weeks to monitor his progress with physical therapy.  If he has questions or concerns in the meantime, he should not hesitate to call.    Subjective:     Dalila Malone is a 44 y.o. male who presents today for follow-up regarding chronic right low back pain extending into the right buttock which began as result of a work injury.  I last saw the patient on October 17, 2019.  At that time I recommended patient continue working with physical therapy and occupational  therapy.  Since I saw the patient he has had 2 additional sessions of physical therapy.  He has not had any additional sessions with Dinorah Durbin, but this is scheduled for later in the month.  Patient reports that overall he feels that therapy is helpful.  He states he had a flareup of pain about 3-1/2 weeks ago.  He was doing squats while he was holding onto the counter by his sink.  He had a flareup of his pain.  He feels that the flareup is improving, but not resolved completely.    Patient complains of right-sided low back pain.  Pain extends to the right buttock.  He is no longer having pain in the hip.  He states that occasionally he feels numbness and tingling in the right anterolateral thigh, but that has improved significantly.  He denies weakness.  He rates his pain today as a 6 or 7 out of 10.  At its worst it is a 7 or 8 out of 10.  At its best it is a 4 5 out of 10.  Pain is aggravated with prolonged sitting, prolonged standing.  He states that with prolonged sitting and prolonged standing he feels heat in his lower back.  Pain is also aggravated with walking, lumbar extension, movement of the lower back.  Pain is alleviated with proper positioning.    Patient is currently in physical [facility] the [facility] center.  He has had 5 sessions of far.  He is seeing Dinorah Durbin for occupational therapy.  He has had 4 sessions of far..  Patient had been using gabapentin 1200 mg 3 times daily.  He states that he stopped taking this for 2 days and did not notice any worsening pain.  He went back on it and did not notice any improvement in his pain.  He would like to come off this medication.  He also uses Tylenol and ibuprofen as needed.    Past medical history is reviewed and is unchanged.    Patient is not currently working.  Patient is working with an .  He and his [facility] indicate that he is interested in looking at job placement.    Review of Systems:  Positive for numbness/tingling, pain worse at night.   Negative for weakness, loss of bowel/bladder control, inability to urinate, headache, difficulty swallowing, difficulty with hand skills, fevers, unintentional weight loss, trip/stumble/falls.     Objective:   CONSTITUTIONAL:  Vital signs as above.  No acute distress.  The patient is well nourished and well groomed.    PSYCHIATRIC:  The patient is awake, alert, oriented to person, place and time.  The patient is answering questions appropriately with clear speech.  Normal affect.  HEENT: Normocephalic, atraumatic.  Sclera clear.    SKIN:  Skin over the face, posterior torso, bilateral upper and lower extremities is clean, dry, intact without rashes.  MUSCULOSKELETAL:  Gait is antalgic, favoring the right.  He uses a cane for assistance.  He ambulates with a flexed forward posture at the hips.  No significant tenderness palpation bilateral lower lumbar paraspinous muscles.  Lumbar flexion moderately restricted.  Lumbar extension severely restricted (patient barely able to reach neutral posture).  Lateral rotation increased low back pain on the right.   The patient has 5/5 strength for the bilateral hip flexors, knee flexors/extensors, ankle dorsiflexors/plantar flexors, ankle evertors/invertors.  Lumbar flexion moderately restricted.  Lumbar extension severely restricted.  Lumbar facet loading maneuvers reproduce low back pain on the right.  NEUROLOGICAL: 1-2+ patellar, achilles reflexes which are symmetric bilaterally.  No ankle clonus bilaterally.  Sensation to light touch is intact in the bilateral L4, L5, and S1 dermatomes.       RESULTS:    EMG right lower extremity dated July 8, 2019:  Comment NCS: Normal study  1.  Normal nerve conduction studies right lower extremity.     Comment EMG: Normal study  1.  Normal needle EMG right lower extremity.     Interpretation: Normal study     1. There is no electrodiagnostic evidence of lumbosacral radiculopathy, lumbosacral plexopathy, or focal neuropathy in  the right lower extremity.     I reviewed the MRI pelvis from [facility] dated June 7, 2019.  This shows normal sacroiliac joints.  No evidence for degenerative or inflammatory sacroiliitis.  No fracture or contusion.  No osseous lesion.  No effusion or intra-articular body in the hip joints.  No tendon tear, tendinosis, muscular atrophy, or edema.  No intramuscular mass or hematoma.  There is no edema, mass, fluid collection or abscess in the soft tissues.  No evidence for trochanteric bursitis.  There is no evidence for pelvic mass, free fluid, lymphadenopathy, or inflammatory changes.  Exam is unremarkable.  Please see report for further details.     I reviewed the updated MRI lumbar spine from [facility] dated April 25, 2019.  This shows a shallow disc bulge at L1-L2 without neural impingement.  There is a shallow disc bulge at L2-3 with mild facet arthropathy but the spinal canal and neuroforaminal are patent.  There is minimal disc bulge at L3-4 with mild facet hypertrophy, no neural impingement.  At L4-5 there is a shallow broad-based disc protrusion.  Spinal canal is patent.  The neural foramen are patent.  At L5-S1 there is mild to moderate facet hypertrophy bilaterally.  No impingement.  Please see report for further details.      I reviewed the MRI lumbar spine from [facility] dated February 22, 2019.  At L2-3 there is a disc bulge but no neural compromise.  At L4-5 there is degeneration and a generalized disc bulge which results in mild bilateral foraminal stenosis.  There is also facet arthropathy in the lower lumbar levels and degenerative change in the bilateral SI joints.

## 2021-06-04 VITALS
HEIGHT: 67 IN | WEIGHT: 236 LBS | SYSTOLIC BLOOD PRESSURE: 153 MMHG | BODY MASS INDEX: 37.04 KG/M2 | OXYGEN SATURATION: 96 % | TEMPERATURE: 97.4 F | RESPIRATION RATE: 16 BRPM | HEART RATE: 76 BPM | DIASTOLIC BLOOD PRESSURE: 94 MMHG

## 2021-06-04 VITALS
HEIGHT: 67 IN | WEIGHT: 230 LBS | HEART RATE: 78 BPM | OXYGEN SATURATION: 94 % | TEMPERATURE: 98.3 F | RESPIRATION RATE: 16 BRPM | DIASTOLIC BLOOD PRESSURE: 80 MMHG | SYSTOLIC BLOOD PRESSURE: 128 MMHG | BODY MASS INDEX: 36.1 KG/M2

## 2021-06-04 VITALS — BODY MASS INDEX: 36.15 KG/M2 | WEIGHT: 233 LBS

## 2021-06-04 VITALS — BODY MASS INDEX: 35.63 KG/M2 | HEIGHT: 67 IN | WEIGHT: 227 LBS

## 2021-06-04 VITALS — WEIGHT: 233 LBS | BODY MASS INDEX: 36.15 KG/M2

## 2021-06-04 VITALS — WEIGHT: 227 LBS | BODY MASS INDEX: 35.22 KG/M2

## 2021-06-04 NOTE — PROGRESS NOTES
[facility] Daily Progress     Patient Name: Dalila Malone  Date: 12/6/2019  Visit #: 7  PTA visit #:  -  Referral Diagnosis:   Chronic right-sided low back pain with right-sided sciatica [M54.41, G89.29]  - Primary       Lumbar radicular pain [M54.16]       Referring provider: Lydia Millan PA-C  Visit Diagnosis:     ICD-10-CM    1. Chronic right-sided low back pain without sciatica M54.5     G89.29    2. Sacroiliac joint pain M53.3      Dalila Malone is a 44 y.o. male who presents to therapy today with chief complaints of chronic low back pain stemming from work comp injury on Feruary 11th, 2019. The patient does report very slow improvements in pain lately. He has been through multiple rounds of PT and injections with only minimal or short term benefit. Currently limited in ability to work, do activities around the house, walk any prolonged period of time. With examination, the patient demonstrates antalgic gait with cane, significant lumbar ROM in all motions, tight HS with possible + neural tension on right, + SI pain provocation testing, but normal myotome/dermatome screens. The patient will likely benefit from skilled PT to improve his mobility, strength, pain, gait, and overall function.    Assessment:     HEP/POC compliance is  fair .     Patient showing improved awareness of how his fear is likely limiting his progress, stating that he knows this is his barrier to making progress. Discussed today goals for the week of increasing walking and working on piriformis stretch. He will also plan to continue squats, with goal of progressing to chair height next session.    Patients gait is improving again between sessions, with improved leg swing on the right. Flexed forward posture remains, but slightly improved, and raised cane height today to try and encourage more upright posture.     Patient will benefit from further PT to give guidance on exercise and activity progression, with extra  education on pain response.    Goal Status:  Pt. will be independent with home exercise program in : 4 weeks  Pt. will report decreased intensity, frequency of : Pain;in 6 weeks;Comment  Comment:: 50%    Pt will: feel comfortable to return to work without increased pain in 12 weeks:  Pt will: be able to do more activity including walking/exercise 5-10 minutes in 3-4 weeks without increased pain:  Pt will: improve SLR mobility to 60 degrees on the right in 4 weeks for improved ability to bend in 6 weeks:      Plan / Patient Education:     Continue with initial plan of care.     Plan for next visit: review HEP, gentle stretching exercises, continue prone positioning, TNE functional strengthening and mobility. Check on goals.    Ppossible MT if tolerating well for SI, glut, lumbar paraspinals.     Subjective:     Pain Ratin/10    Right hip and leg are feeling better, feels like strength is improving. Back does still have the constant pain. Tries to push it with activity around the house. Has gotten to the 4 minute nicolle with walking.     Objective:     Patient given Why do I hurt book to take home and read.    Discussed goals of walking 5 min by next session and doing squat/sit to stands again from a bed to walker to decrease threat.    Right piriformis tightness.    Raised cane height 1 notch to achieve more upright posture.    2. Sensitive nerves.  Patient educated on the concept of the nervous system as the bodies alarm system, and the role of nociception to warn the body of danger. Peripheral nerve sensitization, hyperalgesia and allodynia were explained using metaphors to promote deep learning.   4. Spreading pain (Nosy neighbors). Patient educated regarding spreading pain symptoms, and that feeling pain in adjacent areas of the body does not indicate definite tissue injury. Hyperalgesia, immune responses and central sensitization topics were introduced using metaphors to promote deep learning.  5. Calming  sensitive nerves. Patient was educated regarding endogenous mechanisms and strategies to increase the brain's production of chemicals which decrease pain, such as aerobic exercise and improved pain knowledge. The concepts of pacing, graded exposure, 'sore but safe', and 'hurt does not equal harm' were discussed. Sleep hygiene and diaphragmatic breathing topics introduced to help calm the nervous system and reduce stress.  6. Pain comes from the brain. Patient was educated on the concept of pain as an output of the brain, including nociception versus pain, inhibition and facilitation, and threat value using metaphors to promote deep learning.  10. Tissues. Patient educated about the relationship between tissue injury and pain, and that pain is an output by the brain. Included was the principle that tissue injury does not have to hurt, and that you can have pain without having tissue injury, particularly with a hypersensitive nervous system.    Patient flexed forward and laterally to the right with gait, using SEC, significantly slowed gait speed.    Exercises:  Exercise #1: SKTC X 10  Comment #1: pelvic tilting X 10   Exercise #2: LTR X 10   Comment #2: SI Shotgun MET X 10  Exercise #3: NuStep x5min level 3  Comment #3: supine hamstring stretch x10  Exercise #4: partial squat with UE support x10  Comment #4: standing quad stretch, strap assisted x10  Exercise #5: Piriformis stretch-cues to slowly work on flexing knee up on opposite side to stretch further  Comment #5: Tandem Stance X 15-30 seconds      Treatment Today     TREATMENT MINUTES COMMENTS   Evaluation     Self-care/ Home management     Manual therapy Not done today MFR to QL, lumbar parspinals, sacral offloading technique in L SL.  Grade I :L SL rotational lumbosacral mobilizations.  RLE long axis distraction 3 X 45 seconds.   Neuromuscular Re-education  See TNE above, done during various exercises as well.   Therapeutic Activity     Therapeutic Exercises  28 Review and progression of HEP, nustep warmup   Gait training     Modality__________________                Total 28    Blank areas are intentional and mean the treatment did not include these items.       Gurdeep RAMÍREZ, PT  12/6/2019

## 2021-06-04 NOTE — PROGRESS NOTES
Assessment:   Dalila Malone is a 44 y.o. y.o. male with past medical history significant for nicotine use who presents today for follow-up regarding chronic right low back pain extending into the right upper buttock which began as result of a work injury on February 11, 2019.    At this point he appears to be most symptomatic from lumbar facet arthropathy.  Patient did not have any relief after a right L4-5, L5-S1 transforaminal epidural steroid injection on April 9, 2019.  He then underwent a right sacroiliac joint injection on May 27, 2018 which provided 90% relief of his pain but only lasted 2 to 3 days.  Most recently patient tried a right L4-5, L5-S1 facet joint injection on July 30, 2019 which provided 80% relief of the pain but only lasted 1 week.  Patient has been making slower than expected progress with this type of injury, but he does seem to be making gradual improvement.       Plan:     A shared decision making plan was used.  The patient's values and choices were respected.  The following represents what was discussed and decided upon by the physician assistant and the patient.  The patient's C was present for today's visit.    1.  DIAGNOSTIC TESTS: I reviewed the MRI lumbar spine, MRI pelvis, EMG right lower extremity.  No further diagnostic tests were ordered.    2.  PHYSICAL THERAPY: Patient is currently in [facility].  He has had 7 sessions so far.  He had previously tried physical therapy at 3 different facilities prior to this.  He will continue with the [facility] physical therapy program.  -Patient is also in occupational therapy with Dinorah Durbin.  He has had 4 sessions of far.  He will continue with occupational therapy.    3.  MEDICATIONS:   -Patient is using gabapentin 600 mg 3 times daily.  He does not notice any worsening pain at this dose compared with higher dose.  I told the patient he could continue to decrease his dose.  As long as his pain does not worsen, he  could wean off of this medication completely.  If his pain does worsen a lower dose, he should continue using the lowest possible dose.  -Patient can continue using Tylenol as needed.  -Patient can use ibuprofen as needed.    4.  INTERVENTIONS: I I have discussed this case with Dr. Garces.  We recommend repeating the right L4-5, L5-S1 facet joint injections as the next step.  Hopefully he will have longer lasting relief now that his pain has improved compared with his pain level in July.  Patient indicated he would like to proceed with the injection and an order was placed.    5.  WORK: I provided an updated work note for the patient.  Patient is not currently working.  I provided a work letter loosening his restrictions slightly.  See letter tab for details.  He could lift up to 25 pounds, avoid repetitive bending and twisting at the waist, must feel alternate sit to stand every 15 minutes, 8-hour shifts.  -Goal will be to continue to loosen restrictions.  - Patient is doing an independent job search right now.  -Patient is awaiting a settlement hearing for his work injury early next month.    6.  FOLLOW-UP: I will see the patient back in the clinic for 2-week post procedure follow-up visit after his right L4-5, L5-S1 facet joint injections.  If he has questions or concerns in the meantime, he should not hesitate to call.    Subjective:     Dalila Malone is a 44 y.o. male who presents today for follow-up regarding chronic right low back pain extending into the right buttock which began as result of a work injury.  I last saw the patient on November 18 , 2019.  At that time I recommended patient continue working with physical therapy and occupational therapy.  Since I saw the patient he has had 2 additional sessions of physical therapy.  He has not had any additional sessions with Dinorah Durbin, but there is 1 scheduled within the next few weeks.  Patient reports that overall he feels that therapy is helpful.  He feels  that he has had slight improvement in his pain since he was last seen.    Patient complains of low back pain.  Pain is centralized and slightly eccentric to the right in the lower lumbar region he denies any pain radiating the buttock or down the leg.  He states that he has only intermittent tingling and shocking sensation in the right buttock and lateral hip.  He states that this is occurring less frequently than it did before.  However when it occurs he still feels like his right leg could give out from under him and he feels fearful about that.  He rates his pain today as a 6 or 7 out of 10.  At its worst it is an 8 out of 10.  At its best it is a 2 out of 10.  Pain is aggravated with standing, walking, prolonged sitting.  Patient states that without the cane he can walk for about 4-1/2 minutes.  He can walk longer with his cane.  Pain is alleviated with lying down.    Patient is currently in physical therapy at the spine center.  He has had 7 sessions of far.  He is seeing Dinorah Durbin for occupational therapy.  He has had 4 sessions of far..  Patient had been using gabapentin 600 mg 3 times daily.  He did not try decreasing his dose further than this.  Pain is not worse at this dose compared with 12 and a milligrams 3 times daily.  He also uses Tylenol and ibuprofen as needed.      Past medical history is reviewed and is unchanged.    Patient is not currently working.  Patient has a settlement hearing scheduled for early next month.  Job placement was denied by Worker's Comp.  He is doing an independent job search.  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      Review of Systems:  Positive for numbness/tingling (improving), pain worse at night.  Negative for weakness, loss of bowel/bladder control, inability to urinate, headache, difficulty swallowing, difficulty with hand skills, fevers, unintentional weight loss, trip/stumble/falls.     Objective:    CONSTITUTIONAL:  Vital signs as above.  No acute distress.  The patient is well nourished and well groomed.    PSYCHIATRIC:  The patient is awake, alert, oriented to person, place and time.  The patient is answering questions appropriately with clear speech.  Normal affect.  HEENT: Normocephalic, atraumatic.  Sclera clear.    SKIN:  Skin over the face, posterior torso, bilateral upper and lower extremities is clean, dry, intact without rashes.  MUSCULOSKELETAL:  Gait is antalgic, favoring the right.  He ambulates with a flexed forward posture at the hips.  Mild tenderness to palpation right greater than left lower lumbar paraspinous muscles.  The patient has 5/5 strength for the bilateral hip flexors, knee flexors/extensors, ankle dorsiflexors/plantar flexors, ankle evertors/invertors.  Lumbar flexion moderately restricted.  Lumbar extension severely restricted.  Lumbar facet loading maneuvers reproduce low back pain on the right.  NEUROLOGICAL: 1-2+ patellar, achilles reflexes which are symmetric bilaterally.  No ankle clonus bilaterally.  Sensation to light touch is intact in the bilateral L4, L5, and S1 dermatomes.       RESULTS:    EMG right lower extremity dated July 8, 2019:  Comment NCS: Normal study  1.  Normal nerve conduction studies right lower extremity.     Comment EMG: Normal study  1.  Normal needle EMG right lower extremity.     Interpretation: Normal study     1. There is no electrodiagnostic evidence of lumbosacral radiculopathy, lumbosacral plexopathy, or focal neuropathy in the right lower extremity.     I [facility].  This shows normal sacroiliac joints.  No evidence for degenerative or inflammatory sacroiliitis.  No fracture or contusion.  No osseous lesion.  No effusion or intra-articular body in the hip joints.  No tendon tear, tendinosis, muscular atrophy, or edema.  No intramuscular mass or hematoma.  There is no edema, mass, fluid  collection or abscess in the soft tissues.  No evidence for trochanteric bursitis.  There is no evidence for pelvic mass, free fluid, lymphadenopathy, or inflammatory changes.  Exam is unremarkable.  Please see report for further details.     I reviewed the updated MRI lumbar spine from [facility] dated April 25, 2019.  This shows a shallow disc bulge at L1-L2 without neural impingement.  There is a shallow disc bulge at L2-3 with mild facet arthropathy but the spinal canal and neuroforaminal are patent.  There is minimal disc bulge at L3-4 with mild facet hypertrophy, no neural impingement.  At L4-5 there is a shallow broad-based disc protrusion.  Spinal canal is patent.  The neural foramen are patent.  At L5-S1 there is mild to moderate facet hypertrophy bilaterally.  No impingement.  Please see report for further details.      I reviewed [facility], [facility].  At L2-3 there is a disc bulge but no neural compromise.  At L4-5 there is degeneration and a generalized disc bulge which results in mild bilateral foraminal stenosis.  There is also facet arthropathy in the lower lumbar levels and degenerative change in the bilateral SI joints.

## 2021-06-04 NOTE — PROGRESS NOTES
Assessment:     Diagnoses and all orders for this visit:    Chronic right-sided low back pain without sciatica    Lumbar facet arthropathy  -     diclofenac (VOLTAREN) 50 MG EC tablet; Take 1 tablet (50 mg total) by mouth 3 (three) times a day as needed.  Dispense: 90 tablet; Refill: 2    Sacroiliac joint pain    Work related injury    Lumbar pain  -     cyclobenzaprine (FLEXERIL) 5 MG tablet; Take 1-2 tablets (5-10 mg total) by mouth 3 (three) times a day as needed for muscle spasms.  Dispense: 30 tablet; Refill: 1       Dalila Malone is a 44 y.o. y.o. male patient of GEOVANI Tolliver with past medical history significant for nicotine use who presents today for follow-up regarding:    -Acute flareup of ongoing low back pain right greater than left that does radiate to the right buttock, flareup 12/22/2018.  No current radicular symptoms and patient is neurologically intact on exam.  Pain with all range of motion specifically lumbar extension.       Plan:     A shared decision making plan was used. The patient's values and choices were respected. Prior medical records from 12/17/2019 to current were reviewed today. The following represents what was discussed and decided upon by the provider and the patient.        -DIAGNOSTIC TESTS: Images were personally reviewed and interpreted.   --No recommendations for further imaging at this time as he is neurologically intact on exam.  --Right lower extremity EMG 7/8/2019 shows no radiculopathy.  --Lumbar spine MRI [facility] 4/25/2019 shows L4-5 shallow disc protrusion.  Facet arthropathy L4-5 and L5-S1.  Shallow disc bulge at L1-2 and L2-3.  No high-grade central or foraminal stenosis at any level.  --MRI of the pelvis [facility] sick 7/2019 unremarkable.    -INTERVENTIONS: Patient is scheduled for right L4-5 and L5-S1 facet joint steroid injection with Dr. Garces 1/2/2020.  Recommended that patient move forward with the injection as well.    -MEDICATIONS:  Represcribed diclofenac 50 mg 1 tablet 3 times daily for pain and inflammation at this time instead of ibuprofen.  Advised patient take this 3 times daily for the next couple of days with food for the flareup in his pain and then he can take it as needed after that.  -Also recommended continuing muscle relaxant Flexeril 5 mg 1 to 2 tablets 3 times daily as needed for myofascial pain.  Discussed if 5 mg is too sedating he can take a half a tablet at 2.5 mg during the daytime hours.  Discussed side effects of medications and proper use. Patient verbalized understanding.    -PHYSICAL THERAPY: Encourage patient continue with physical therapy as tolerated at this time, he is scheduled to see Familia again in the next couple of days.  Discussed the importance of core strengthening, ROM, stretching exercises with the patient and how each of these entities is important in decreasing pain.  Explained to the patient that the purpose of physical therapy is to teach the patient a home exercise program.  These exercises need to be performed every day in order to decrease pain and prevent future occurrences of pain.        Workability: Work restrictions decreased to 4-hour shifts at this time as he does not feel he can do an 8-hour shift, recommend following up with Lydia for further workability restrictions.  He states he is not currently working however either way, however does need workability for his work comp .    -PATIENT EDUCATION:  20 minutes of total visit time was spent face to face with the patient today, 60 % of the visit was spent on counseling, education, and coordinating care.   -5 minutes spent outside of visit time, non-face-to-face time, reviewing chart.    -FOLLOW UP: Follow-up for injection with Dr. Garces, otherwise sooner as needed if symptoms are not improving with medications.  Advised to contact clinic if symptoms worsen or change.    Subjective:     Dalila Malone is a 44 y.o. male who presents today  for follow-up regarding ongoing low back pain with significant flareup as of 12/22/2019 with no known injury patient reports that he was getting up out of bed and his back locked up and now he has significant constant pain that is currently a 9/10, a 6 at its best and his low back at the beltline and lumbosacral junction that radiates to the right buttock, he does report that the pain is bilateral in his low back however predominant in the right and feels similar to the pain he had previously but more intense.  Patient denies any lower extremity pain, denies numbness or tingling sensations into his lower extremities, denies recent trips or falls or balance changes, denies lower extremity weakness, denies bowel or bladder loss control, denies saddle anesthesia.      Patient does report that he had been doing well with physical therapy as well as good progress with Dinorah Durbin OT, therefore this is concerning for him as a setback.    Patient does see GEOVANI Tolliver for his ongoing symptoms related to work injury, he was last seen by Lydia 12/17/2019.    Treatment to Date:  [facility] sessions 12/6/2019  Patient currently seeing Dinorah Durbin OT    Right L4-5 and L5-S1 TFESI 4/9/2019.  Right SI joint steroid injection 5/22/2019 with 90% relief for 2-3 days only  Right L4-5 and L5-S1 facet joint steroid injection 7/30/2019 with 80% relief x1 week.    Medications:  Tylenol  Flexeril  Diclofenac 50 mg  Gabapentin 300 mg  Lidocaine 5% patch  Salonpas    Patient Active Problem List   Diagnosis     Acute midline thoracic back pain     Benign essential hypertension     Obesity (BMI 30.0-34.9)     Lumbar radicular pain       Current Outpatient Medications on File Prior to Encounter   Medication Sig Dispense Refill     acetaminophen (TYLENOL) 325 MG tablet Take 650 mg by mouth every 6 (six) hours as needed for pain.       albuterol (PROAIR HFA;PROVENTIL HFA;VENTOLIN HFA) 90 mcg/actuation inhaler Inhale 2 puffs every  "6 (six) hours as needed for wheezing. 18 g 11     ibuprofen (ADVIL,MOTRIN) 600 MG tablet Take 1 tablet (600 mg total) by mouth every 6 (six) hours as needed for pain. 20 tablet 0     lidocaine (LIDODERM) 5 % Remove & Discard patch within 12 hours or as directed by MD 30 patch 1     methyl salicylate/menth/camph (SALONPAS TOP) Apply topically. salonpas patch OTC       [DISCONTINUED] diclofenac (VOLTAREN) 50 MG EC tablet Take 1 tablet (50 mg total) by mouth 3 (three) times a day as needed. 90 tablet 2     diazePAM (VALIUM) 5 MG tablet Take one tab by mouth one - 1.5 hours prior to appt.  Bring second tab to  appointment, may take again 15 minutes before procedure. 2 tablet 0     gabapentin (NEURONTIN) 300 MG capsule Take 4 capsules (1,200 mg total) by mouth 3 (three) times a day. 60 capsule 0     [DISCONTINUED] cyclobenzaprine (FLEXERIL) 5 MG tablet Take 1-2 tablets (5-10 mg total) by mouth at bedtime as needed for muscle spasms. 30 tablet 0     No current facility-administered medications on file prior to encounter.        No Known Allergies    Past Medical History:   Diagnosis Date     Medical history non-contributory         Review of Systems  ROS:  Specifically negative for bowel/bladder dysfunction, balance changes, headache, dizziness, foot drop, fevers, chills, appetite changes, nausea/vomiting, unexplained weight loss. Otherwise 13 systems reviewed are negative. Please see the patient's intake questionnaire from today for details.    Reviewed Social, Family, Past Medical and Past Surgical history with patient, no significant changes noted since prior visit.     Objective:     /90 (Patient Site: Left Arm, Patient Position: Sitting, Cuff Size: Adult Regular)   Pulse 76   Ht 5' 7.32\" (1.71 m)   Wt (!) 227 lb (103 kg)   BMI 35.22 kg/m      PHYSICAL EXAMINATION:    --CONSTITUTIONAL: Well developed, well nourished, healthy appearing individual.  --PSYCHIATRIC: Appropriate mood and affect. No difficulty " interacting due to temper, social withdrawal, or memory issues.  --SKIN: Lumbar region is dry and intact.   --RESPIRATORY: Normal rhythm and effort. No abnormal accessory muscle breathing patterns noted.   --MUSCULOSKELETAL:  Normal lumbar lordosis noted, no lateral shift.  --GROSS MOTOR: Patient arises from seated position slowly with cane for stability on his left side, standing position forward flexed at the waist.  --LUMBAR SPINE:  Inspection reveals no evidence of deformity. Range of motion is limited in all movements lumbar flexion extension lateral rotation, greatest limitation with increased pain with lumbar extension.  mild tenderness to palpation lumbar spine right greater than left at the lumbosacral junction. Straight leg raising in the seated position is negative to radicular pain. Sciatic notch non-tender.   --SACROILIAC JOINT: One Finger point test negative.  --LOWER EXTREMITY MOTOR TESTING:  Plantar flexion left 5/5, right 5/5   Dorsiflexion left 5/5, right 5/5   Great toe MTP extension left 5/5, right 5/5  Knee flexion left 5/5, right 5/5  Knee extension left 5/5, right 5/5   Hip flexion left 5/5, right 5/5  Hip abduction left 5/5, right 5/5  Hip adduction left 5/5, right 5/5   --HIPS: Full range of motion bilaterally.   --NEUROLOGIC: Bilateral patellar and achilles reflexes are physiologic and symmetric. Sensation to light touch is intact in the bilateral L4, L5, and S1 dermatomes.    RESULTS:   Imaging: Lumbar spine imaging was reviewed today. The images were shown to the patient and the findings were explained using a spine model.

## 2021-06-04 NOTE — TELEPHONE ENCOUNTER
The physician contacted the patient today to go through his phone consent.  The patient told the physician that he was concerned because he is having significantly increased pain.  He reports that his pain is a 10 out of 10 and it has been for the last week.  He reports that with previous flares, his pain would be a 10 out of 10 for a couple of days but then will go back down to a 7 or an 8 out of 10.  He is very concerned because he feels like this may be something different than his previous pain.  The patient was reassured that his exam was negative for any concerning findings when he was last seen by Margie Reyes on December 23 of 2019.  Offered to have the patient return to see Margie to reevaluate him and ensure that his exam has not changed versus just coming in for the injection as scheduled on Thursday.  Patient went back and forth, but then Dr. Garces encouraged him to make an appointment today to see Margie, given his concern over his symptom the patient did agree to come in for an appointment at 1:00 (nurse visit time) today with Margie.      In anticipation of the patient will undergo the procedure on Thursday, a phone consent was performed today.  The patient wished to take a pre-procedure anxiolytic necessitating a phone consent.  The patient has been offered other conservative treatment (physical therapy, medications, etc.) but has opted to proceed with an injection.  The risks and benefits of the injection (Riht L4-5 and L5-S1 facet joint injections) were discussed with the patient over the phone on 12-31-19 at approximately 09:15.  The risks of the injection include infection, bleeding, damage to surrounding structures, nerve injury, permanent weakness, permanent paralysis, worsened pain, soreness, headache, allergic reaction, no change in pain.      The patient understands that they cannot have symptoms of an infection or be on antibiotics at the time of the injection as this would  increase their risk of infection. If they start antibiotics prior to the procedure, they should call the clinic to see if the procedure will need to be rescheduled.  The patient understands that if they start any blood thinners prior to the injection, the provider must be notified immediately.  The patient denies any allergies to iodine contrast dye or iodine products.  The patient denies any symptoms of an active infection and denies taking antibiotics. The patient denies taking any prescription blood thinning medications. The patient denies any allergies to iodine or iodine contrast.       The patient verbalized understanding of the information given. The patient was given the opportunity to ask questions of the physician.  The patient elected to proceed and gave consent over the phone with Deedee Freire LPN as a witness.  Informed consent form was signed by the physician and the nurse.

## 2021-06-04 NOTE — PROGRESS NOTES
[facility] Occupational Therapy   Daily Progress    Patient Name: Dalila Malone  Date: 12/18/2019  Visit #: 5 of 6   Referral Diagnosis:     Chronic right-sided low back pain without sciatica    Work related injury  Referring provider: Lydia Millan PA-C  Visit Diagnosis:     ICD-10-CM    1. Chronic right-sided low back pain without sciatica M54.5     G89.29    2. Decreased activities of daily living (ADL) Z78.9    3. Impaired instrumental activities of daily living (IADL) R68.89        Assessment:     Pt is engaged for duration of session and demonstrates retention of materials covered in past sessions through independent recollection of and reporting on previously set goal areas.  Pt appears encouraged by progress made thus far in therapies, and demonstrates fewer pain behaviors this date (I.e. minimal shifting position in chair, absence of facial grimacing).  Pt would benefit from continued energy conservation education to support continued improvement of endurance for ADL/IADLs.  Pt is making progress towards functional goals and is appropriate to continue skilled occupational therapy as outlined in initial plan of care.     Goal Status:  Pt will: independently verbalize and report at home utilization of 3 health management strategies to improve habits and routines for increased self-management of pain allowing increased participation in functional, every day activities in 12 weeks. (Progressing)  Pt will: utilize sleep hygiene and positioning strategies independently, with visual aids and compensatory tools, to improve sleep as reported by decrease in waking times per night due to pain and decreased time to fall asleep in 6 weeks. (Progressing.)  Pt will: verbalize and demonstrate 2 stress management techniques that he utilizes to increase self-management of pain and allow for increased participation with  responsibilities in 6 weeks. (Progressing.)      Plan / Patient Education:     Pt is  making progress towards functional goals and will continue skilled occupational therapy as indicated in initial plan of care.    Plan for next treatment:    Follow up with sleep hygiene goal areas, progress as indicated    Explore calming strategies to decrease nervous system sensitivity - bean bag tapping    Follow up, Bad Day Plan    Follow up seated cat/cow and shoulder rolls for gentle mobilization to break up long periods of sitting    Subjective/Objective:     Current Pain Ratin mm  Best pain level in the last week: 28 mm  Worst pain level in the last week: 65 mm    R side pain has been coming and going, but much improved since last visit.  He states pain is primarily in back and lower back.  He states he has finished reading the Why You Hurt workbook and remembers some of the concepts from OT visits.  He has been working on walking up to 4.5 minutes.  At 4.5 minutes he begins to feel the burning in his lower back.  He has been trying to use distraction techniques when pain flares.  He states that he has no recollection of progressive muscle relaxation, however has been doing better with deep breathing with less pain.  He has continued to have his last cigarette at 6pm which has made it easier to breathe and relax while getting ready for bed      Treatment Today  TREATMENT MINUTES COMMENTS   Self-care/ Home & Health management 55 Bad Day Plan, My pacing plan, follow up with previous goal areas   Neuromuscular Re-education     Therapeutic Exercises     Standardized cognitive performance test           Total 55 For further activity detail, please see below   Blank areas are intentional and mean the treatment did not include these items.       Self Care/Home and Health management Training:    Energy Conservation Method: My Pacing Plan  Therapist educated patient on the purpose and importance of a pacing plan to increase activity to prevent overactivity and pain exacerbations.  Pt chose playing with his son  and standing to cook as the activity he would like to pace with a focus on duration.  Pt indicated that he can currently play with his son for ~5 minutes at a time before he needs to stop and take a break due to his pain.  He states he can stand for 5 minutes at a time while cooking before needing to sit.  Therapist and patient reviewed self-management strategies/productive rest activities he can use to recover including items reviewed on the Bad Day Plan.    Therapist and patient problem solved progression of activity, discussing patient should be completing activity to the point of pain, not stopping short and not crashing through pain.  Pt was instructed to progress activity upon the absence of pain with consistent completion and/or trial next progression after 1 week of consistent activity.  Pacing plan as follows:  Start Point Playing with Son Standing while cooking   Stage 1 5 minutes 5 minutes   Stage 2 7 minutes 6 minutes   Stage 3 10 minutes 7 minutes   Stage 4  8 minutes   Stage 5  10 minutes       Follow up with Goal Areas  Pt reports he has continued to practice deep breathing daily and notices significantly less pain with taking deep breaths.  He shares that his walking tolerance has increased to 4.5 minutes in comparison with ~2 minutes previously before needing to take a break due to pain.  He continues to stop smoking after 6 pm for improved sleep hygiene.      Energy Conservation: Bad Day Plan  Pt and therapist completed Bad Day Plan in order to improve self-management of flare ups in pain that lead to total inactivity which will increase pain overall.  Bad day plan involves several components: predicting when bad days will occur and identifying rest and recovery activities that will support re-engagement.  Plan also involves identifying self-management strategies and productive rest activities with emphasis on pacing and avoiding overactivity and 'catching up' for lost time.  Pt ID'd positive  thinking in the context of reminding himself that things are temporary.  He worked with therapist to identify other self-management strategies including relaxation/self-care, meaningful activity engagement, modalities, lighter/movement and activities.        Dinorah Durbin  12/18/2019

## 2021-06-04 NOTE — PATIENT INSTRUCTIONS - HE
DISCHARGE INSTRUCTIONS    During office hours (8:00 a.m.- 4:00 p.m.) questions or concerns may be answered  by calling [facility] Navigation Nurses at  [phone].  Messages received after hours will be returned the following business day.      In the case of an emergency, please dial 911 or seek assistance at the nearest Emergency Room/Urgent Care facility.     All Patients:    ? You may experience an increase in your symptoms for the first 2 days (It may take anywhere between 2 days- 2 weeks for the steroid to have maximum effect).    ? You may use ice on the injection site, as frequently as 20 minutes each hour if needed.    ? You may take your pain medicine.    ? You may continue taking your regular medication after your injection. If you have had a Medial Branch Block you may resume pain medication once your pain diary is completed.    ? You may shower. No swimming, tub bath or hot tub for 48 hours.  You may remove your bandaid/bandage as soon as you are home.    ? You may resume light activities, as tolerated.    ? Resume your usual diet as tolerated.    ? It is strongly advised that you do not drive for 1-3 hours post injection.    ? If you have had oral sedation:  Do not drive for 8 hours post injection.      ? If you have had IV sedation:  Do not drive for 24 hours post injection.  Do not operate hazardous machinery or make important personal/business decisions for 24 hours.      POSSIBLE STEROID SIDE EFFECTS (If steroid/cortisone was used for your procedure)    -If you experience these symptoms, it should only last for a short period      Swelling of the legs                Skin redness (flushing)       Mouth (oral) irritation     Blood sugar (glucose) levels              Sweats                      Mood changes    Headache    Sleeplessness         POSSIBLE PROCEDURE SIDE EFFECTS  -Call the Spine Center if you are concerned    Increased Pain             Increased numbness/tingling         Nausea/Vomiting            Bruising/bleeding at site        Redness or swelling                                                Difficulty walking        Weakness             Fever greater than 100.5    *In the event of a severe headache after an epidural steroid injection that is relieved by lying down, please call the [facility] to speak with a clinical staff member*

## 2021-06-04 NOTE — PATIENT INSTRUCTIONS - HE
~Please call Nurse Navigation line [phone] with any questions or concerns about your treatment plan, if symptoms worsen and you would like to be seen urgently, or if you have problems controlling bladder and bowel function.

## 2021-06-04 NOTE — PATIENT INSTRUCTIONS - HE
~Please call Nurse Navigation line [phone] with any questions or concerns about your treatment plan, if symptoms worsen and you would like to be seen urgently, or if you have problems controlling bladder and bowel function.  ~Follow Up Appointment time slots with Margie Reyes, CNP with the [facility]ine Center, are also available at the [facility] location near [facility] on the first and third THURSDAY afternoons of each month.    Prescribed diclofenac today. Please take 1 tablet 3 times daily as needed for pain control as well as to aid in decreasing inflammation. Take medication with a full glass of water and food. Do not take Advil, Ibuprofen, Aleve, or Naproxen while taking this medication.

## 2021-06-04 NOTE — TELEPHONE ENCOUNTER
Patient stopped by the clinic and dropped off an application for disability parking certificate at the . Patient is requesting to have his treating provider Lydia Millan PA-C complete the forms and return a call to the patient once forms are completed for .    After having his treating provider review the disability parking certificate, he was contacted by this author to inform him that unfortunately Lydia is not able to complete the disability parking certificate at this time and he may contact his PCP's office to see if his PCP's willing to complete the form. Form was tossed to be shredded by the request of the patient. Patient states he will stop by the [employer] again if needed.

## 2021-06-04 NOTE — PROGRESS NOTES
Assessment:     Diagnoses and all orders for this visit:    Chronic right-sided low back pain without sciatica    Lumbar facet arthropathy    Sacroiliac joint pain       Dalila Malone is a 44 y.o. y.o. male patient of GEOVANI Tolliver, with past medical history significant for hypertension, obesity who presents today for follow-up regarding:    -Acute flareup of ongoing right greater than left low back pain radiating to the right buttock, flareup started 12/22/2019.  Patient is neurologically intact on exam, no current radicular or concerning symptoms.  Pain appears to be mostly related to facet mediated pain as well as myofascial pain, significant tenderness over the lumbosacral junction and paraspinal muscles right greater than left and increased pain with facet loading on exam.  Reassurance given to patient today.     Plan:     A shared decision making plan was used. The patient's values and choices were respected. Prior medical records from 12/23/19 were reviewed today. The following represents what was discussed and decided upon by the provider and the patient.        -DIAGNOSTIC TESTS: Images were personally reviewed and interpreted.   --No need for further imaging at this time as he is neurologically intact on exam.  No new or recent trauma.  --Right lower extremity EMG 7/8/2019 shows no radiculopathy.  --Lumbar spine MRI [facility] 4/25/2019 shows L4-5 shallow disc protrusion.  Facet arthropathy L4-5 and L5-S1.  Shallow disc bulge at L1-2 and L2-3.  No high-grade central or foraminal stenosis at any level.  --MRI of the pelvis [facility] sick 7/2019 unremarkable.    -INTERVENTIONS: Patient is scheduled for right L4-5 and L5-S1 facet joint steroid injection with Dr. Garces 1/2/2020.  Patient was reassured that it is safe to proceed with the injection as this still seems appropriate at this time with no new concerning findings on exam, and would recommend following up 2 weeks postinjection with Lydia  GEOVANI Millan.    -MEDICATIONS: Did advise patient to continue with current medication regimen including diclofenac 50 mg 1 tablet twice daily as needed for pain and inflammation, Tylenol 2 tablets every 6 hours as needed for pain and Flexeril as needed for myofascial pain which he typically only takes at nighttime.  Discussed side effects of medications and proper use. Patient verbalized understanding.    -PHYSICAL THERAPY: Did discuss the importance of continuing with healthy spine movements at this time, therefore advised patient to continue with home exercises as tolerated and did discuss fear avoidance issues that may be increasing his pain as well.  Advised patient to reestablish care with Dinorah Durbin OT as well as continue follow-up with [facility], patient does have a scheduled appointment on Monday.  Discussed the importance of core strengthening, ROM, stretching exercises with the patient and how each of these entities is important in decreasing pain.  Explained to the patient that the purpose of physical therapy is to teach the patient a home exercise program.  These exercises need to be performed every day in order to decrease pain and prevent future occurrences of pain.        -PATIENT EDUCATION:  20 minutes of total visit time was spent face to face with the patient today, 60 % of the visit was spent on counseling, education, and coordinating care.   -5 minutes spent outside of visit time, non-face-to-face time, reviewing chart.    -FOLLOW UP: Follow-up for injection with Dr. Garces 1/2/2020.  Did advise patient that he should ensure that his medical insurance is active at that point as currently work comp is denying his injection.  Advised to contact clinic if symptoms worsen or change.    Subjective:     Dalila Malone is a 44 y.o. male who presents today for follow-up regarding ongoing bilateral low back pain which had been improving previously with physical therapy however he did have a  flareup 12/22/2019 that has been worsening in the last couple of days as well and patient is very concerned about this and wanted to be seen for reassessment prior to his injection scheduled for 1/2/2020.  He does report that his pain is still worse at the lumbosacral junction on the right low back however does radiate slightly higher up on the right side into the muscles up into the lower thoracic region that he states is newer, he does also have pain radiating to the right buttock however denies any lower extremity pain otherwise, denies recent trips or falls or balance changes, denies bowel or bladder loss control, denies saddle anesthesia.  Patient does report that the low back pain on the right does make him feel weaker on his right lower extremity.  Denies again any recent trips or falls or reinjury.  He does report he is having some mild tingling however only into the right buttock, no tingling or lower into the lower extremity.    Patient's wife was present today during entire visit and added to past medical/surgical/family/social history and history of presenting illness.     Patient does report that he had been doing well prior with physical therapy as well as good progress with Dinorah Durbin OT.     Patient does see GEOVANI Tolliver for his ongoing symptoms related to work injury, he was last seen by Lydia 12/17/2019.     Treatment to Date:  [facility] 12/6/2019  Patient currently seeing Dinorah Durbin OT     Right L4-5 and L5-S1 TFESI 4/9/2019.  Right SI joint steroid injection 5/22/2019 with 90% relief for 2-3 days only  Right L4-5 and L5-S1 facet joint steroid injection 7/30/2019 with 80% relief x1 week.     Medications:  Tylenol  Flexeril  Diclofenac 50 mg  Gabapentin 300 mg  Lidocaine 5% patch  Salonpas    Patient Active Problem List   Diagnosis     Acute midline thoracic back pain     Benign essential hypertension     Obesity (BMI 30.0-34.9)     Lumbar radicular pain       Current  Outpatient Medications on File Prior to Encounter   Medication Sig Dispense Refill     acetaminophen (TYLENOL) 325 MG tablet Take 650 mg by mouth every 6 (six) hours as needed for pain.       albuterol (PROAIR HFA;PROVENTIL HFA;VENTOLIN HFA) 90 mcg/actuation inhaler Inhale 2 puffs every 6 (six) hours as needed for wheezing. 18 g 11     cyclobenzaprine (FLEXERIL) 5 MG tablet Take 1-2 tablets (5-10 mg total) by mouth 3 (three) times a day as needed for muscle spasms. 30 tablet 1     diclofenac (VOLTAREN) 50 MG EC tablet Take 1 tablet (50 mg total) by mouth 3 (three) times a day as needed. 90 tablet 2     gabapentin (NEURONTIN) 300 MG capsule Take 4 capsules (1,200 mg total) by mouth 3 (three) times a day. 60 capsule 0     ibuprofen (ADVIL,MOTRIN) 600 MG tablet Take 1 tablet (600 mg total) by mouth every 6 (six) hours as needed for pain. 20 tablet 0     lidocaine (LIDODERM) 5 % Remove & Discard patch within 12 hours or as directed by MD 30 patch 1     methyl salicylate/menth/camph (SALONPAS TOP) Apply topically. salonpas patch OTC       diazePAM (VALIUM) 5 MG tablet Take one tab by mouth one - 1.5 hours prior to appt.  Bring second tab to  appointment, may take again 15 minutes before procedure. 2 tablet 0     No current facility-administered medications on file prior to encounter.        No Known Allergies    Past Medical History:   Diagnosis Date     Medical history non-contributory         Review of Systems  ROS:  Specifically negative for bowel/bladder dysfunction, balance changes, headache, dizziness, foot drop, fevers, chills, appetite changes, nausea/vomiting, unexplained weight loss. Otherwise 13 systems reviewed are negative. Please see the patient's intake questionnaire from today for details.    Reviewed Social, Family, Past Medical and Past Surgical history with patient, no significant changes noted since prior visit.     Objective:     BP (!) 143/98 (Patient Site: Left Arm, Patient Position: Sitting)    Pulse 94   Temp 97.8  F (36.6  C) (Oral)   Wt (!) 227 lb (103 kg)   BMI 35.22 kg/m      PHYSICAL EXAMINATION:    --CONSTITUTIONAL: Well developed, well nourished, healthy appearing individual.  --PSYCHIATRIC: Appropriate mood and affect. No difficulty interacting due to temper, social withdrawal, or memory issues.  --SKIN: Lumbar region is dry and intact.   --RESPIRATORY: Normal rhythm and effort. No abnormal accessory muscle breathing patterns noted.   --MUSCULOSKELETAL:  Normal lumbar lordosis noted, no lateral shift.  --GROSS MOTOR: Easily arises from a seated position. Gait is non-antalgic  --LUMBAR SPINE:  Inspection reveals no evidence of deformity. Range of motion is not limited in lumbar flexion, increased pain with lumbar extension and lateral rotation simultaneously on the right, mild on the left.  Tenderness to palpation paraspinal muscles right lumbar spine with tenderness at the L4-5 and L5-S1 region right greater than left. Straight leg raising is negative to radicular pain. Sciatic notch non-tender.   --SACROILIAC JOINT: One Finger point test negative.  --LOWER EXTREMITY MOTOR TESTING:  Plantar flexion left 5/5, right 5/5   Dorsiflexion left 5/5, right 5/5   Great toe MTP extension left 5/5, right 5/5  Knee flexion left 5/5, right 5/5  Knee extension left 5/5, right 5/5   Hip flexion left 5/5, right 5/5  Hip abduction left 5/5, right 5/5  Hip adduction left 5/5, right 5/5   --HIPS: Full range of motion bilaterally. Negative FABERs on the involved lower extremity.   --NEUROLOGIC: Bilateral patellar and achilles reflexes are physiologic and symmetric. Sensation to light touch is intact in the bilateral L4, L5, and S1 dermatomes.    RESULTS:   Imaging: Lumbar spine imaging was reviewed today. The images were shown to the patient and the findings were explained using a spine model.

## 2021-06-04 NOTE — TELEPHONE ENCOUNTER
Called patient and advised to return to clinic vs going to the ER. Assisted pt with scheduling the appointment.

## 2021-06-05 NOTE — TELEPHONE ENCOUNTER
----- Message from Lydia Millan PA-C sent at 1/8/2020  9:09 AM CST -----  Please call the patient let him know I reviewed his MRI lumbar spine.  There is no complication with the procedure.  The scan looks stable compared with his MRI lumbar spine from February 2019.  There is no right-sided neural impingement to cause right lower extremity symptoms.  Increased pain after an injection should improve with time.  For the urinary incontinence, I recommend he see his primary care provider for further evaluation.  At this point I do not think I have anything else to offer from a medical spine standpoint.  I recommend a referral to the pain clinic.  I recommend a functional capacity evaluation to determine work restrictions.

## 2021-06-05 NOTE — TELEPHONE ENCOUNTER
Since appointments for pain clinic and FCE can take some time I am going to go ahead and enter referrals now.  We can cancel if needed.

## 2021-06-05 NOTE — PROGRESS NOTES
"Assessment:   Dalila Malone is a 44 y.o. y.o. male with past medical history significant for nicotine use who presents today for follow-up regarding chronic right low back pain extending into the right upper buttock which began as result of a work injury on February 11, 2019.    Patient status post a right L4-5, L5-S1 facet joint injection January 2, 2020.  Patient had increased pain following the injection radiating into the right lower extremity with associated numbness, tingling, and weakness.  He also reported urinary incontinence.  Follow-up MRI lumbar spine was stable.  Patient reports no improvement in his pain following the facet joint injections.  - Patient has not made progress like a typical patient with the same type of problem.  There have been yellow flags in regards to his pain which continues to seem out of proportion to exam.  He has demonstrated Francisco signs on previous exams.  He is also had frequent flareups of pain requiring time off of work.        Plan:     A shared decision making plan was used.  The patient's values and choices were respected.  The following represents what was discussed and decided upon by the physician assistant and the patient.  The patient's C was present for today's visit.    1.  DIAGNOSTIC TESTS: I reviewed the MRI lumbar spine, MRI pelvis, EMG right lower extremity.  No further diagnostic tests were ordered.    2.  PHYSICAL THERAPY: Patient has had 9 sessions of medics physical therapy.  According to his most recent physical therapy note January 13, 2020, \"patient has not made any significant continuous improvements with PT and is rather refractory to PT treatment.  He is returning to referring provider this week.  May do well with transition to trial aquatic PT.\"    -Patient has already trialed aquatic physical therapy.  This was put on hold when he reported urinary incontinence.  He had not had any improvement with aquatic physical therapy before was put on hold.  I " am going to hold off on ordering any additional physical therapy.  I will defer to the pain clinic as to whether or not he should participate in any additional physical therapy programs.    3.  MEDICATIONS:   -Patient is using Tylenol and ibuprofen as needed for pain.    4.  INTERVENTIONS: No additional interventions were ordered.  I would be very cautious with ordering additional interventional pain management in the future, given the patient's significant increase in pain since this most recent injection.    5.  WORK: I recommended a functional capacity evaluation to objectively determine work restrictions.  I referred the patient for the functional capacity evaluation on January 8, 2020.  I placed the patient back on the light duty work restrictions that I recommended on December 17, 2019.  Please see letter tab for details.  Patient states that he does not feel that he can work 8 hours due to his pain.  However, I told the patient that I think he is medically safe to work 8 hours within the light duty restrictions I outlined.    6.  FOLLOW-UP: This patient has been referred to the pain clinic.  I do not think I have anything else to offer for this patient.  Patient can follow-up with me as needed.  -In regards to the urinary incontinence, recommend he see a primary care provider.  I do not see a reason in his spine for him to have urinary incontinence.    Subjective:     Dalila Malone is a 44 y.o. male who presents today for follow-up regarding chronic right low back pain with radiation into the right lower extremity with associated numbness, tingling, and weakness.  I last saw the patient on January 7, 2020.  At that time he was following up after a right L4-5, L5-S1 facet joint injection which was performed on January 3, 2017.  He reported significant increased pain and urinary incontinence.  I ordered a stat MRI lumbar spine which was stable.  Patient states that since then he has had slight improvement in the  increased postprocedural pain.  However, he does not feel that he is any better than he was before his injections.    Patient complains of right-sided low back pain.  Pain radiates into the right buttock around the lateral hip, and into the right groin.  Pain also radiates into the proximal lateral thigh.  Patient has numbness and tingling which is intermittent that radiates down the right buttock, down the right posterior lateral thigh, just distal to the knee.  He is no longer having pain and paresthesias all the way down to the foot.  He feels weak in the right leg.  He states that he is still having some urinary incontinence at night.  He rates his pain today as an 8 of 10.  At its worst it is an 8 of 10.  As best it is a 7 out of 10.  Pain is aggravated with movement and he denies any alleviating factors to the pain.    Patient [facility].  He has had 9 sessions of far.  According to his physical therapy note from January 13, pain is been refractory to physical therapy.  His physical therapist recommends aquatic therapy.  Patient had been using Tylenol and ibuprofen as needed for pain.    Past medical history is reviewed and is unchanged.    Patient is not currently working.  He is working with an .  He has a hearing scheduled for next month.    Review of Systems:  Positive for numbness/tingling (slightly improved), weakness, wetting pants, pain much worse at night, trip/stumble/falls.  Negative for loss of bowel control, inability to urinate, headache, difficulty swallowing, difficulty with hand skills, fevers, unintentional weight loss.     Objective:   CONSTITUTIONAL:  Vital signs as above.  Patient appears to be in a great deal of pain.  The patient is well nourished and well groomed.    PSYCHIATRIC:  The patient is awake, alert, oriented to person, place and time.  The patient is answering questions appropriately with clear speech.  Normal affect.  HEENT:  Normocephalic, atraumatic.  Sclera clear.    SKIN:  Skin over the face, posterior torso, bilateral upper and lower extremities is clean, dry, intact without rashes.  MUSCULOSKELETAL:  Gait is severely antalgic, favoring the right.  He ambulates with a flexed forward posture at the hips.  He uses a cane for assistance.  Severe tenderness to light palpation right mid and lower lumbar paraspinous muscles.  The patient has 4/5 strength right hip flexors, knee flexors/extensors (possibly pain limited), otherwise 5/5 strength for the left hip flexors, knee flexors/extensors, bilateral ankle dorsiflexors/plantar flexors, bilateral ankle evertors/invertors.    NEUROLOGICAL: 1-2+ patellar, achilles reflexes which are symmetric bilaterally.  No ankle clonus bilaterally.  Sensation to light touch is intact in the bilateral L4, L5, and S1 dermatomes.       RESULTS:    EMG right lower extremity dated July 8, 2019:  Comment NCS: Normal study  1.  Normal nerve conduction studies right lower extremity.     Comment EMG: Normal study  1.  Normal needle EMG right lower extremity.     Interpretation: Normal study     1. There is no electrodiagnostic evidence of lumbosacral radiculopathy, lumbosacral plexopathy, or focal neuropathy in the right lower extremity.     I reviewed the MRI pelvis [facility] dated June 7, 2019.  This shows normal sacroiliac joints.  No evidence for degenerative or inflammatory sacroiliitis.  No fracture or contusion.  No osseous lesion.  No effusion or intra-articular body in the hip joints.  No tendon tear, tendinosis, muscular atrophy, or edema.  No intramuscular mass or hematoma.  There is no edema, mass, fluid collection or abscess in the soft tissues.  No evidence for trochanteric bursitis.  There is no evidence for pelvic mass, free fluid, lymphadenopathy, or inflammatory changes.  Exam is unremarkable.  Please see report for further details.     I reviewed the updated MRI lumbar spine from  [facility] dated January 7, 2020.  This shows mild lumbar spondylosis stable compared with February 22, 2019.  There is no significant central canal or neuroforaminal stenosis.  There is mild multilevel degenerative disc disease with mild to moderate multilevel facet arthropathy.  Please see report for further details.

## 2021-06-05 NOTE — TELEPHONE ENCOUNTER
VM received from  Clara Webber requesting to speak to Lydia herself or one of her nurses. Clara is requesting the results of the patient's latest MRI and her recommendations.    After discussing with Lydia, this author called and spoke with Clara and informed her the patient's MRI is stable and the patient was recommended to the pain clinic as well as a FCE. Clara is requesting both referrals which was entered by Lydia yesterday 1/8/2020.    Both referrals was faxed to Clara @ [phone].

## 2021-06-05 NOTE — TELEPHONE ENCOUNTER
Phone call to patient to review results and provider's recommendations. Results given and explained. Discussed that provider would like him to follow up and discuss the urinary incontinence symptoms with his PCP. Stated understanding. Also discussed the referral to pain clinic and FCE. He will discuss this further at his appointment with PSP next week.     He does report he has an appointment with his  in the beginning of February.

## 2021-06-05 NOTE — PROGRESS NOTES
Assessment:   Dalila Malone is a 44 y.o. y.o. male with past medical history significant for nicotine use who presents today for follow-up regarding chronic right low back pain extending into the right upper buttock which began as result of a work injury on February 11, 2019.    Patient status post a right L4-5, L5-S1 facet joint injection January 2, 2020.  Patient has had increased pain since the injection which is now radiating into the right lower extremity with associated numbness, tingling, and weakness.  He also reports change in bladder function including urinary incontinence each night since his injection and difficulty emptying his bladder during the day since the injection.  He has not had any fevers.  On exam, patient demonstrates weakness in the right hip flexors and knee flexors/extensors (possibly pain limited).  Sensation and reflexes remain intact.  - Patient has not made progress like a typical patient with the same type of problem.  There have been yellow flags in regards to his pain which continues to seem out of proportion to exam.  He has demonstrated Francisco signs on previous exams.  He is also had frequent flareups of pain requiring time off of work.       Plan:     A shared decision making plan was used.  The patient's values and choices were respected.  The following represents what was discussed and decided upon by the physician assistant and the patient.  The patient's QRC was present for today's visit.    1.  DIAGNOSTIC TESTS: I reviewed the MRI lumbar spine, MRI pelvis, EMG right lower extremity.  I did order a stat MRI lumbar spine to rule out complication following his procedure given his increased symptoms including change in bladder function and weakness on exam today.    2.  PHYSICAL THERAPY: Patient has not been to physical therapy for over 1 month.  He states that he had to cancel one visit since the injection due to increased pain, but I am not sure why he has not been for an entire  month.  -Patient is also in occupational therapy with Dinorah Durbin.  He has had 5 sessions of far.  He will continue with occupational therapy.    3.  MEDICATIONS:   -Patient is using Tylenol and ibuprofen as needed for pain.    4.  INTERVENTIONS: No additional interventions were ordered.  I would be very cautious with ordering additional interventional pain management in the future, given the patient's significant increase in pain since this most recent injection.    5.  WORK: I provided an updated work note for the patient indicating that he would remain off of work for the past 1 week.  He is not currently working anyways.  Further recommendations to come pending MRI lumbar spine.  If this is normal, I would recommend resuming same light duty work restrictions outlined by me December 17.  If it is abnormal, we will address as needed.  -In my opinion, as long as there is no complication with the injection, I would recommend a functional capacity evaluation to determine permanent restrictions.  I do not think I realistically have any additional treatment options at this point.  - Patient is doing an independent job search right now.  -Patient is awaiting a settlement hearing for his work injury tomorrow.    6.  FOLLOW-UP: A nurse will call the patient with the results of his MRI lumbar spine.  If he has questions or concerns in the meantime, he should not hesitate to call.    Subjective:     Dalila Malone is a 44 y.o. male who presents today for follow-up regarding increased pain following his injection.  Patient underwent a right L4-5, L5-S1 facet joint injection January 3, 2017.  Patient reports that beginning the night of the injection he experienced increased pain with numbness, tingling, and weakness down the right leg.  He is also experienced urinary incontinence since the injection.    Patient complains of low back pain.  Pain is now affecting both sides of the lower back, worse on the right than the left.  Pain  radiates into the right buttock and right hip, wrapping around into the right groin.  Pain also radiates down the posterolateral thigh to the knee.  He has numbness and tingling in the same distribution as his pain which then extends into the right lateral calf, dorsal foot, and great toe.  Patient states that his right leg feels weak.  He states that he has to ascend stairs leading with his left leg.  He rates his pain today as a 9 or 10 out of 10.  At its worst it is a 10 out of 10.  At its best it is an 8 of 10.  Pain is aggravated with movement, standing up, taking deep breaths, walking.  Patient denies any alleviating factors to the pain.  Patient reports that every night since his injection 4 days ago he has had urinary incontinence while he was asleep.  He denies any urinary incontinence during the day, however, he states that during the day his urinary stream seems very slow and he feels like he is having to go more frequently; he thinks he may not be emptying completely.  He denies any loss of bowel control.  He denies any saddle anesthesia.    Patient is currently in physical [facility] center.  He has had 7 sessions of far.  He has not had any physical therapy for the past 1 month.  He notes that he had to cancel one appointment due to the increased pain after the injection.  He is seeing Dinorah Durbin for occupational therapy.  He has had 5 sessions of far (1 session since my visit with him 1 month ago).  Patient had been using Tylenol and ibuprofen as needed for pain.    Past medical history is reviewed and is unchanged.    Patient is not currently working.  Patient has a settlement hearing scheduled for tomorrow.      Review of Systems:  Positive for numbness/tingling (worse), weakness (new), loss of bladder control (new), pain much worse at night, trip/stumble/falls.  Negative for loss of bowel control, inability to urinate, headache, difficulty swallowing, difficulty with hand skills, fevers,  unintentional weight loss.     Objective:   CONSTITUTIONAL:  Vital signs as above.  Patient appears to be in a great deal of pain.  The patient is well nourished and well groomed.    PSYCHIATRIC:  The patient is awake, alert, oriented to person, place and time.  The patient is answering questions appropriately with clear speech.  Normal affect.  HEENT: Normocephalic, atraumatic.  Sclera clear.    SKIN:  Skin over the face, posterior torso, bilateral upper and lower extremities is clean, dry, intact without rashes.  MUSCULOSKELETAL:  Gait is severely antalgic, favoring the right.  He ambulates with a flexed forward posture at the hips.  Severe tenderness to light palpation right greater than left lower lumbar paraspinous muscles.  The patient has 4/5 strength right hip flexors, knee flexors/extensors (possibly pain in the middle), otherwise 5/5 strength for the left hip flexors, knee flexors/extensors, bilateral ankle dorsiflexors/plantar flexors, bilateral ankle evertors/invertors.    NEUROLOGICAL: 1-2+ patellar, achilles reflexes which are symmetric bilaterally.  No ankle clonus bilaterally.  Sensation to light touch is intact in the bilateral L4, L5, and S1 dermatomes.       RESULTS:    EMG right lower extremity dated July 8, 2019:  Comment NCS: Normal study  1.  Normal nerve conduction studies right lower extremity.     Comment EMG: Normal study  1.  Normal needle EMG right lower extremity.     Interpretation: Normal study     1. There is no electrodiagnostic evidence of lumbosacral radiculopathy, lumbosacral plexopathy, or focal neuropathy in the right lower extremity.     I reviewed the MRI pelvis from [facility] dated June 7, 2019.  This shows normal sacroiliac joints.  No evidence for degenerative or inflammatory sacroiliitis.  No fracture or contusion.  No osseous lesion.  No effusion or intra-articular body in the hip joints.  No tendon tear, tendinosis, muscular atrophy, or edema.  No intramuscular  mass or hematoma.  There is no edema, mass, fluid collection or abscess in the soft tissues.  No evidence for trochanteric bursitis.  There is no evidence for pelvic mass, free fluid, lymphadenopathy, or inflammatory changes.  Exam is unremarkable.  Please see report for further details.     I reviewed the updated MRI lumbar spine from [facility] dated April 25, 2019.  This shows a shallow disc bulge at L1-L2 without neural impingement.  There is a shallow disc bulge at L2-3 with mild facet arthropathy but the spinal canal and neuroforaminal are patent.  There is minimal disc bulge at L3-4 with mild facet hypertrophy, no neural impingement.  At L4-5 there is a shallow broad-based disc protrusion.  Spinal canal is patent.  The neural foramen are patent.  At L5-S1 there is mild to moderate facet hypertrophy bilaterally.  No impingement.  Please see report for further details.      I reviewed the MRI lumbar spine from [facility] dated February 22, 2019.  At L2-3 there is a disc bulge but no neural compromise.  At L4-5 there is degeneration and a generalized disc bulge which results in mild bilateral foraminal stenosis.  There is also facet arthropathy in the lower lumbar levels and degenerative change in the bilateral SI joints.

## 2021-06-05 NOTE — PROGRESS NOTES
[facility] Rehabilitation Daily Progress     Patient Name: Dalila Malone  Date: 1/13/2020  Visit #: 9  PTA visit #:  -  Referral Diagnosis:   Chronic right-sided low back pain with right-sided sciatica [M54.41, G89.29]  - Primary       Lumbar radicular pain [M54.16]       Referring provider: Lydia Millan PA-C  Visit Diagnosis:     ICD-10-CM    1. Chronic right-sided low back pain without sciatica M54.5     G89.29    2. Sacroiliac joint pain M53.3      Dalila Malone is a 44 y.o. male who presents to therapy today with chief complaints of chronic low back pain stemming from work comp injury on Feruary 11th, 2019. The patient does report very slow improvements in pain lately. He has been through multiple rounds of PT and injections with only minimal or short term benefit. Currently limited in ability to work, do activities around the house, walk any prolonged period of time. With examination, the patient demonstrates antalgic gait with cane, significant lumbar ROM in all motions, tight HS with possible + neural tension on right, + SI pain provocation testing, but normal myotome/dermatome screens. The patient will likely benefit from skilled PT to improve his mobility, strength, pain, gait, and overall function.    Assessment:     HEP/POC compliance is  fair .     Patient returns to PT after a flare of pain following his injection. The patients pain has now changed more to an anterior hip pain. He does have testing + for labral tear and impingement, but no reason for onset of this pain, with the exception of continued alteration of gait.     Unfortunately, the patient continued to hold on exercises again during his flare of pain, despite discussion of continuing to do these to the best of his ability. He avoids these for fear of making anything serious worse.    Overall the patient has not made any significant continuous improvements with PT and is rather refractory to PT treatment. He is returning to referring  provider this week. May do well with transition to trial aquatic PT.     Goal Status:  Pt. will be independent with home exercise program in : 4 weeks ongoing  Pt. will report decreased intensity, frequency of : Pain;in 6 weeks;Comment  Comment:: 50% not met  Pt will: feel comfortable to return to work without increased pain in 12 weeks: not met  Pt will: be able to do more activity including walking/exercise 5-10 minutes in 3-4 weeks without increased pain: not met, previously up to 4 min  Pt will: improve SLR mobility to 60 degrees on the right in 4 weeks for improved ability to bend in 6 weeks:  variable    Plan / Patient Education:     Continue with initial plan of care.     Plan for next visit: review HEP, gentle stretching exercises, continue prone positioning, TNE functional strengthening and mobility. Check on goals.    Ppossible MT if tolerating well for SI, glut, lumbar paraspinals.     Subjective:     Pain Ratin-8/10    Right side had been getting better, injection seemed to set him back initially. Some pain in the right side of the hip almost to the front a touch by the stomach. Has started to do his exercises over the last week again now. Had stopped because he was concerned something more serious was wrong with change in pain.    Any soreness in the leg is muscle soreness. Pain just in the front of the hip.    Objective:     Tenderness to right hip flexor, ASIS.  No pain in lower right quadrant to palpation.    Right hip special testing  + FADIR  + compression and POSH  + scour  + resisted SLR/Stinchfield    2. Sensitive nerves.  Patient educated on the concept of the nervous system as the bodies alarm system, and the role of nociception to warn the body of danger. Peripheral nerve sensitization, hyperalgesia and allodynia were explained using metaphors to promote deep learning.   4. Spreading pain (Nosy neighbors). Patient educated regarding spreading pain symptoms, and that feeling pain in  adjacent areas of the body does not indicate definite tissue injury. Hyperalgesia, immune responses and central sensitization topics were introduced using metaphors to promote deep learning.  5. Calming sensitive nerves. Patient was educated regarding endogenous mechanisms and strategies to increase the brain's production of chemicals which decrease pain, such as aerobic exercise and improved pain knowledge. The concepts of pacing, graded exposure, 'sore but safe', and 'hurt does not equal harm' were discussed. Sleep hygiene and diaphragmatic breathing topics introduced to help calm the nervous system and reduce stress.  6. Pain comes from the brain. Patient was educated on the concept of pain as an output of the brain, including nociception versus pain, inhibition and facilitation, and threat value using metaphors to promote deep learning.  10. Tissues. Patient educated about the relationship between tissue injury and pain, and that pain is an output by the brain. Included was the principle that tissue injury does not have to hurt, and that you can have pain without having tissue injury, particularly with a hypersensitive nervous system.    Patient flexed forward and laterally to the right with gait, using SEC, significantly slowed gait speed.    Exercises:  Exercise #1: SKTC X 10  Comment #1: pelvic tilting X 10   Exercise #2: LTR X 10   Exercise #3: NuStep x5min level 3  Comment #3: supine hamstring stretch x10  Exercise #4: partial squat with UE support x10  Comment #4: standing quad stretch, strap assisted x10  Exercise #5: Piriformis stretch-cues to slowly work on flexing knee up on opposite side to stretch further  Comment #5: Tandem Stance X 15-30 seconds  Exercise #6: hip flexor stretch on right X 30 seconds  Comment #6: Supine hip ER/IR x 10   Exercise #7: Heel Slides X 10       Treatment Today     TREATMENT MINUTES COMMENTS   Evaluation     Self-care/ Home management     Manual therapy Not done today MFR  to QL, lumbar parspinals, sacral offloading technique in L SL.  Grade I :L SL rotational lumbosacral mobilizations.  RLE long axis distraction 3 X 45 seconds.   Neuromuscular Re-education  See TNE above, done during various exercises as well.   Therapeutic Activity     Therapeutic Exercises 27 Review and progression of HEP, nustep warmup   Gait training     Modality__________________                Total 27    Blank areas are intentional and mean the treatment did not include these items.       Gurdeep RAMÍREZ, PT  1/13/2020

## 2021-06-06 NOTE — TELEPHONE ENCOUNTER
Central PA team  [phone]  Pool: [facility] PA MED ([id number])          PA has been initiated.       PA form completed and faxed insurance via Cover My Meds     Key:  [id number]     Medication:  DICLOFENAC GEL    Insurance:  [employer]        Response will be received via fax and may take up to 5-10 business days depending on plan

## 2021-06-06 NOTE — PROGRESS NOTES
BRENDEN Malone is a 45 y.o. male here to establish care. He does not believe he has had any lab work done in a long time     His biggest health concern is his chronic low back pain which began with an accident at work 1 year ago (see my previous note from ofelia mata last visit). His next step is to see the Pain clinic about this.     He is also concerned about chronic sinus congestion. He is wondering if he could be allergic to something in his home because he has had nasal congestion for several months. He has had no fevers. He does take Afrin sometimes but knows he isn't supposed to take it long -term.     He started taking lisinopril after our last visit and is doing fine. No dizziness or lightheadedness.       Past Medical History:   Diagnosis Date     Medical history non-contributory      Current Outpatient Medications on File Prior to Visit   Medication Sig Dispense Refill     acetaminophen (TYLENOL) 325 MG tablet Take 975 mg by mouth every 6 (six) hours as needed for pain.        lisinopriL (PRINIVIL,ZESTRIL) 10 MG tablet Take 1 tablet (10 mg total) by mouth daily. 90 tablet 3     albuterol (PROAIR HFA;PROVENTIL HFA;VENTOLIN HFA) 90 mcg/actuation inhaler Inhale 2 puffs every 6 (six) hours as needed for wheezing. 18 g 11     cyclobenzaprine (FLEXERIL) 5 MG tablet Take 1-2 tablets (5-10 mg total) by mouth 3 (three) times a day as needed for muscle spasms. 30 tablet 1     ibuprofen (ADVIL,MOTRIN) 600 MG tablet Take 1 tablet (600 mg total) by mouth every 6 (six) hours as needed for pain. (Patient taking differently: Take 400 mg by mouth every 6 (six) hours as needed for pain. ) 20 tablet 0     methyl salicylate/menth/camph (SALONPAS TOP) Apply topically. salonpas patch OTC       ranitidine (ZANTAC) 150 MG tablet Take 150 mg by mouth.       [DISCONTINUED] diazePAM (VALIUM) 5 MG tablet Take one tab by mouth one - 1.5 hours prior to appt.  Bring second tab to  appointment, may take again 15 minutes before procedure. 2  "tablet 0     [DISCONTINUED] diclofenac sodium (VOLTAREN) 1 % Gel Apply 1 g to back twice daily. 60 g 3     No current facility-administered medications on file prior to visit.        Past medical and social history reviewed with no changes.   ?  ?  O  /80   Pulse 78   Temp 98.3  F (36.8  C) (Oral)   Resp 16   Ht 5' 7.13\" (1.705 m)   Wt (!) 230 lb (104.3 kg)   SpO2 94%   BMI 35.89 kg/m     Vitals reviewed. Nursing note reviewed.  General Appearance: Pleasant and alert, in no acute distress  HEENT: mucous membranes moist. Nontender over frontal and maxillary sinuses.   CV: RRR, no murmur, rubs, gallops  Resp: No respiratory distress. Clear to auscultation bilaterally. No wheezes, rales, rhonchi  Abd: Soft, nontender, nondistended, bowel sounds present. No masses.  Ext: No peripheral edema, good distal perfusion  MSK: walking with a cane, slight limp.   Skin: warm, dry, intact, no rash noted  Neuro: no focal deficits, CNs II-XII normal.   Psych: mood and affect are normal.    A/P  Heu was seen today for establish care.    Diagnoses and all orders for this visit:    Encounter for preventive care  -     Lipid Profile  -     HM2(CBC w/o Differential)    Benign essential hypertension  -     Comprehensive Metabolic Panel    Chronic maxillary sinusitis: advised trying singulair daily with Flonase daily. Will also check for dust IgE. If not improving well with this regimen, would refer to Allergy.   -     fluticasone propionate (FLONASE) 50 mcg/actuation nasal spray; 2 sprays into each nostril daily.  -     montelukast (SINGULAIR) 10 mg tablet; Take 1 tablet (10 mg total) by mouth at bedtime.  -     Dust, House, Mcguire IgE  -     Dust, House, Filiberto IgE    Need for immunization against influenza  -     Influenza, Recombinant, Inj, Quadrivalent, PF, 18+YRS    Chronic bilateral low back pain without sciatica: he has filled out the pain clinic packet and would like to establish care there. His only medications are " tylenol and ibuprofen. Could suggest Lyrica or gabapentin, though he does not seem to have true radicular pain.        Return in about 3 months (around 6/2/2020) for recheck.      Options for treatment and follow-up care were reviewed with the patient and/or guardian. Dalila Malone and/or guardian engaged in the decision making process and verbalized understanding of the options discussed and agreed with the final plan.    Elina Banks MD

## 2021-06-06 NOTE — TELEPHONE ENCOUNTER
Prior Authorization Request  Who s requesting:  Pharmacy  Pharmacy Name and Location: [facility] #[id number]  Medication Name: diclofenac sodium (VOLTAREN) 1 % Gel  Insurance Plan: [employer] [place]   Insurance Member ID Number:  [id number]  CoverMyMeds Key: N/A  Informed patient that prior authorizations can take up to 10 business days for response:   NA  Okay to leave a detailed message: No

## 2021-06-06 NOTE — TELEPHONE ENCOUNTER
Name of form/paperwork: Application of Disability Parking Certificate  Have you been seen for this request: Yes, 03/02/2020  Do we have the form: Yes, in Blue folders  When is form needed by: ASAP  How would you like the form returned:    and Phone#:Dalila Malone [phone]  Fax Number:   Patient Notified form requests are processed in 3-5 business days: Yes  Okay to leave a detailed message? Yes

## 2021-06-06 NOTE — TELEPHONE ENCOUNTER
Paperwork filled out as CMT able.  Pt saw provider to est care but unable to determine why this form is needed.  Thanks.

## 2021-06-06 NOTE — TELEPHONE ENCOUNTER
Please call pt and explain I do not typically fill out handicapped parking forms for injuries such as his. I usually fill them out only for patients who are walker or wheelchair-bound. I think it would benefit his back pain for him to walk, even if he needs to go slowly.     I would be happy to discuss this with him further at his next appointment.   Thank you.     Elina Banks MD

## 2021-06-06 NOTE — PROGRESS NOTES
"BRENDEN Malone is a 45 y.o. male here to establish care and to continue care for his low back pain.     1 year ago, he hurt his back at work and has pain on the lower left side. It is still hurting and  sometimes \"flares up,\" where the pain is very bad every 2-6 weeks.  The last flare-up lasted for 2 weeks. Sometimes it feels like unbearable pain like when the accident first happened. Right now, he rates his pain around a 4 which is his baseline. The pain radiates down his left leg and he gets \"little shocks,\" on the  right side as well, down his buttock or thigh.     Worker's comp had been covering for 1 year but they stopped paying and he no longer works. He's not sure if he will be able to find another job. He was seeing [facility]. She gave him restrictions of not carrying over 25 lbs and being able to sit and stand to rest, but even with this, it was difficult for him to work.     Does his PT exercises every day. He takes the stairs in his apartment for exercise.     Did injections 3 times but they worked for about 4-7 days and then the pain was back. The last injection was in November or December.     He has not yet gone to the Pain clinic because his workers comp didn't cover it.     Takes tylenol and ibuprofen as needed, which helps a little bit. Stopped gabapentin because it didn't help. Wife rubs IcyHot on his back sometimes. These are his only pain medications.   Past Medical History:   Diagnosis Date     Medical history non-contributory      Current Outpatient Medications on File Prior to Visit   Medication Sig Dispense Refill     acetaminophen (TYLENOL) 325 MG tablet Take 975 mg by mouth every 6 (six) hours as needed for pain.        ibuprofen (ADVIL,MOTRIN) 600 MG tablet Take 1 tablet (600 mg total) by mouth every 6 (six) hours as needed for pain. (Patient taking differently: Take 400 mg by mouth every 6 (six) hours as needed for pain. ) 20 tablet 0     albuterol (PROAIR " "HFA;PROVENTIL HFA;VENTOLIN HFA) 90 mcg/actuation inhaler Inhale 2 puffs every 6 (six) hours as needed for wheezing. 18 g 11     cyclobenzaprine (FLEXERIL) 5 MG tablet Take 1-2 tablets (5-10 mg total) by mouth 3 (three) times a day as needed for muscle spasms. 30 tablet 1     diazePAM (VALIUM) 5 MG tablet Take one tab by mouth one - 1.5 hours prior to appt.  Bring second tab to  appointment, may take again 15 minutes before procedure. 2 tablet 0     methyl salicylate/menth/camph (SALONPAS TOP) Apply topically. salonpas patch OTC       ranitidine (ZANTAC) 150 MG tablet Take 150 mg by mouth.       [DISCONTINUED] lidocaine (LIDODERM) 5 % Remove & Discard patch within 12 hours or as directed by MD 30 patch 1     No current facility-administered medications on file prior to visit.        Past medical and social history reviewed with no changes.   ?  ROS:  CV: no chest pain  Resp: no shortness of breath  : no current urinary incontinence  ?  O  BP (!) 153/94   Pulse 76   Temp 97.4  F (36.3  C) (Oral)   Resp 16   Ht 5' 7\" (1.702 m)   Wt (!) 236 lb (107 kg)   SpO2 96% Comment: ra  BMI 36.96 kg/m     Vitals reviewed. Nursing note reviewed.  General Appearance: Pleasant and alert, in no acute distress  HEENT: mucous membranes moist  MSK: walking with a cane, gets up gingerly.  Skin: warm, dry, intact, no rash noted  Neuro: no focal deficits, CNs II-XII normal.   Psych: mood and affect are normal.    A/P  Dalila was seen today for back pain.    Diagnoses and all orders for this visit:    Lumbar radicular pain: His provider at the spine clinic felt there was no more they could offer him there. I did strongly encourage Dalila to keep doing his PT exercises. Also prescribed voltaren gel to try and referred both for acupuncture and Orthopedics to see if there would be any role for surgery in his case. He had minimal benefit from gabapentin, but would also consider cymbalta or lyrica.   -     diclofenac sodium (VOLTAREN) 1 % Gel; " Apply 1 g to back twice daily.  -     Ambulatory referral for Acupuncture  -     Ambulatory referral to Orthopedics    Morbid obesity (H): Encouraged exercise as tolerated. Low-impact exercise such as walking or swimming would be a good place to start    Benign essential hypertension: BP is elevated and he states it has often been higher than this. Will start lisinopril today. He has another appt with me next week and we will check his BP again then.   -     lisinopriL (PRINIVIL,ZESTRIL) 10 MG tablet; Take 1 tablet (10 mg total) by mouth daily.         Return in about 1 week (around 3/4/2020) for Next scheduled follow up.    Options for treatment and follow-up care were reviewed with the patient and/or guardian. Dalila Malone and/or guardian engaged in the decision making process and verbalized understanding of the options discussed and agreed with the final plan.    Elina Banks MD

## 2021-06-07 NOTE — PROGRESS NOTES
[facility] Discharge Summary  Patient Name: Dalila Malone  Date: 4/8/2020  Referral Diagnosis:     Chronic right-sided low back pain without sciatica    Work related injury  Referring provider: Lydia Millan PA-C  Visit Diagnosis:   1. Chronic right-sided low back pain without sciatica     2. Decreased activities of daily living (ADL)     3. Impaired instrumental activities of daily living (IADL)         Goals:  Pt will: independently verbalize and report at home utilization of 3 health management strategies to improve habits and routines for increased self-management of pain allowing increased participation in functional, every day activities in 12 weeks. - Not completed.  Pt will: utilize sleep hygiene and positioning strategies independently, with visual aids and compensatory tools, to improve sleep as reported by decrease in waking times per night due to pain and decreased time to fall asleep in 6 weeks. - Not completed.  Pt will: verbalize and demonstrate 2 stress management techniques that he utilizes to increase self-management of pain and allow for increased participation with  responsibilities in 6 weeks. - Not completed.    Patient was seen for 5 visits from 8/14/19 to 12/18/19 with 2 missed appointments.  The patient discontinued therapy, did not return.    Therapy will be discontinued at this time.  The patient will need a new referral to resume.    Thank you for your referral.  Dinorah Durbin  4/8/2020  11:40 AM

## 2021-06-07 NOTE — TELEPHONE ENCOUNTER
Last office visit: 02/26/2020  Last refill: 02/26/2020  Last lab check: CMP 03/02/2020  Next appointment: None.     Chart reviewed. Please review findings below.     Benign essential hypertension: BP is elevated and he states it has often been higher than this. Will start lisinopril today. He has another appt with me next week and we will check his BP again then.   -     lisinopriL (PRINIVIL,ZESTRIL) 10 MG tablet; Take 1 tablet (10 mg total) by mouth daily.

## 2021-06-07 NOTE — TELEPHONE ENCOUNTER
RN cannot approve Refill Request    RN can NOT refill this medication med is not covered by policy/route to provider     . Last office visit: 3/2/2020 Elina Banks MD Last Physical: Visit date not found Last MTM visit: Visit date not found Last visit same specialty: 3/2/2020 Elina Banks MD.  Next visit within 3 mo: Visit date not found  Next physical within 3 mo: Visit date not found      Josephine Live, [facility] Triage/Med Refill 5/4/2020    Requested Prescriptions   Pending Prescriptions Disp Refills     montelukast (SINGULAIR) 10 mg tablet [Pharmacy Med Name: MONTELUKAST 10MG TABLETS] 90 tablet 3     Sig: TAKE 1 TABLET(10 MG) BY MOUTH AT BEDTIME       There is no refill protocol information for this order

## 2021-06-07 NOTE — PROGRESS NOTES
[facility] Rehabilitation Discharge Summary  Patient Name: Dalila Malone  Date: 4/6/2020  Referral Diagnosis:   Chronic right-sided low back pain with right-sided sciatica [M54.41, G89.29]  - Primary       Lumbar radicular pain [M54.16]       Referring provider: Lydia Millan PA-C  Visit Diagnosis:   1. Chronic right-sided low back pain without sciatica     2. Sacroiliac joint pain         Goals:  Pt. will be independent with home exercise program in : 4 weeks ongoing  Pt. will report decreased intensity, frequency of : Pain;in 6 weeks;Comment  Comment:: 50% not met  Pt will: feel comfortable to return to work without increased pain in 12 weeks: not met  Pt will: be able to do more activity including walking/exercise 5-10 minutes in 3-4 weeks without increased pain: not met, previously up to 4 min  Pt will: improve SLR mobility to 60 degrees on the right in 4 weeks for improved ability to bend in 6 weeks:  variable    Patient was seen for 9 visits from 10/7/2019 to 1/13/2020 with - missed appointments.    The patient made progress towards his goals, however continued to have flares of pain despite PT. The patient discontinued therapy.    Therapy will be discontinued at this time.  The patient will need a new referral to resume.    Thank you for your referral.  Gurdeep RAMÍREZ, PT  4/6/2020  12:30 PM

## 2021-06-07 NOTE — TELEPHONE ENCOUNTER
Pt dropped off Unemployment paperwork that he would like her to fill out by next week.  I will put the paperwork in EP's blue folder.

## 2021-06-10 NOTE — TELEPHONE ENCOUNTER
RN cannot approve Refill Request    RN can NOT refill this medication med is not covered by policy/route to provider. Last office visit: Visit date not found Last Physical: Visit date not found Last MTM visit: Visit date not found Last visit same specialty: 3/2/2020 Elina Banks MD.  Next visit within 3 mo: Visit date not found  Next physical within 3 mo: Visit date not found      Myranda Do, [facility] Triage/Med Refill 8/8/2020    Requested Prescriptions   Pending Prescriptions Disp Refills     clotrimazole (LOTRIMIN) 1 % cream 30 g 0     Sig: Apply to affected area of feet two times a day as needed       There is no refill protocol information for this order

## 2021-06-15 NOTE — TELEPHONE ENCOUNTER
Refill Approved    Rx renewed per Medication Renewal Policy. Medication was last renewed on 3/2/20.    Jacoby Faust, [facility] Triage/Med Refill 3/8/2021     Requested Prescriptions   Pending Prescriptions Disp Refills     fluticasone propionate (FLONASE) 50 mcg/actuation nasal spray [Pharmacy Med Name: FLUTICASONE 50MCG NASAL SP (120) RX] 16 g 11     Sig: SHAKE LIQUID AND USE 2 SPRAYS IN EACH NOSTRIL DAILY       Nasal Steroid Refill Protocol Passed - 3/7/2021  4:38 PM        Passed - Patient has had office visit/physical in last 2 years     Last office visit with prescriber/PCP: 3/2/2020 OR same dept: Visit date not found OR same specialty: 3/2/2020 Elina Banks MD Last physical: Visit date not found Last MTM visit: Visit date not found    Next appt within 3 mo: Visit date not found  Next physical within 3 mo: Visit date not found  Prescriber OR PCP: Elina Banks MD  Last diagnosis associated with med order: 1. Chronic maxillary sinusitis  - fluticasone propionate (FLONASE) 50 mcg/actuation nasal spray [Pharmacy Med Name: FLUTICASONE 50MCG NASAL SP (120) RX]; SHAKE LIQUID AND USE 2 SPRAYS IN EACH NOSTRIL DAILY  Dispense: 16 g; Refill: 11     If protocol passes may refill for 12 months if within 3 months of last provider visit (or a total of 15 months).

## 2021-06-16 NOTE — TELEPHONE ENCOUNTER
Patient is noted to have set up appt for pcr testing 4/20. In area of patient notes pt writes 'chest pain  RT heartburn..', Noted that this had been addressed by provider on virtual visit. Called pt to assist in scheduling PCR testing  appt sooner.  Pt is moved to today's schedule and Provider recommendation is reinforced RT chest pain.

## 2021-06-16 NOTE — TELEPHONE ENCOUNTER
RN cannot approve Refill Request    RN can NOT refill this medication PCP messaged that patient is overdue for Office Visit. Last office visit: 3/2/2020 Elina Banks MD Last Physical: Visit date not found Last MTM visit: Visit date not found Last visit same specialty: 3/2/2020 Elina Banks MD.  Next visit within 3 mo: Visit date not found  Next physical within 3 mo: Visit date not found      Brigitte Haynes, [facility] Triage/Med Refill 4/10/2021    Requested Prescriptions   Pending Prescriptions Disp Refills     losartan (COZAAR) 25 MG tablet [Pharmacy Med Name: LOSARTAN 25MG TABLETS] 90 tablet 3     Sig: TAKE 1 TABLET(25 MG) BY MOUTH DAILY       Angiotensin Receptor Blocker Protocol Failed - 4/9/2021 10:36 AM        Failed - PCP or prescribing provider visit in past 12 months       Last office visit with prescriber/PCP: 3/2/2020 Elina Banks MD OR same dept: Visit date not found OR same specialty: 3/2/2020 Elnia Banks MD  Last physical: Visit date not found Last MTM visit: Visit date not found   Next visit within 3 mo: Visit date not found  Next physical within 3 mo: Visit date not found  Prescriber OR PCP: Elina Banks MD  Last diagnosis associated with med order: 1. Benign essential hypertension  - losartan (COZAAR) 25 MG tablet [Pharmacy Med Name: LOSARTAN 25MG TABLETS]; TAKE 1 TABLET(25 MG) BY MOUTH DAILY  Dispense: 90 tablet; Refill: 3    2. Chronic maxillary sinusitis  - montelukast (SINGULAIR) 10 mg tablet [Pharmacy Med Name: MONTELUKAST 10MG TABLETS]; TAKE 1 TABLET(10 MG) BY MOUTH AT BEDTIME  Dispense: 90 tablet; Refill: 3    If protocol passes may refill for 12 months if within 3 months of last provider visit (or a total of 15 months).             Failed - Serum potassium within the past 12 months     No results found for: LN-POTASSIUM          Failed - Serum creatinine within the past 12 months     Creatinine   Date Value Ref Range Status   03/02/2020 1.15 0.70 - 1.30 mg/dL Final             Passed -  Blood pressure filed in past 12 months     BP Readings from Last 1 Encounters:   04/28/20 136/84                montelukast (SINGULAIR) 10 mg tablet [Pharmacy Med Name: MONTELUKAST 10MG TABLETS] 90 tablet 3     Sig: TAKE 1 TABLET(10 MG) BY MOUTH AT BEDTIME       There is no refill protocol information for this order

## 2021-06-17 NOTE — PATIENT INSTRUCTIONS - HE
Patient Instructions by Gurdeep Blackmon PT at 10/11/2019  9:30 AM     Author: Gurdeep Blackmon PT Service: -- Author Type: Physical Therapist    Filed: 10/11/2019  9:57 AM Encounter Date: 10/11/2019 Status: Addendum    : Gurdeep Blackmon PT (Physical Therapist)    Related Notes: Original Note by Gurdeep Blackmon PT (Physical Therapist) filed at 10/11/2019  9:54 AM        QUAD STRETCH - STANDING    While in a standing position, bend your knee back behind and hold your ankle/foot.    Next, gently pull your knee into a more bent position.    Use stretch strap looped over the foot to assist.    10 repetitions  2x/day           Quadriceps stretch    While lying on your side reach back and hold your top foot and bend your knee until a stretch is felt.    Use stretch strap to assist.     Can do in sidelying or standing as shown above, whichever is most comfortable.        Stand on a step, large phone book or foot stool with the uninvolved leg     Hold onto something secure to keep your balance     With the opposite leg dangling and free off the floor, swing the leg gently forward and back     Watch your lower back and keep it straight     Go forward and back, repeating the process   10 repetitions  1-2x/day        Single

## 2021-06-17 NOTE — PATIENT INSTRUCTIONS - HE
Patient Instructions by Gurdeep Blackmon PT at 12/6/2019  9:30 AM     Author: Gurdeep Blackmon PT Service: -- Author Type: Physical Therapist    Filed: 12/6/2019  9:56 AM Encounter Date: 12/6/2019 Status: Addendum    : Gurdeep Blackmon PT (Physical Therapist)    Related Notes: Original Note by Gurdeep Blackmon PT (Physical Therapist) filed at 12/6/2019  9:52 AM        PIRIFORMIS STRETCH    While lying on your back with both knee bent, cross your affected leg on the other knee.     Next, hold your unaffected thigh and pull it up towards your chest until a stretch is felt in the buttock.    Hold 15-30 seconds  2 times per side  2 times per day      TANDEM STANCE    Stand with one foot directly in front of the other so that the toes of one foot touches the heel of the other. Maintain your balance.  If this is too difficult, take a step forward and maintain your balance in that position.    Hold 15-30 seconds  2-3 times per side  2 times per day

## 2021-06-17 NOTE — PATIENT INSTRUCTIONS - HE
Patient Instructions by Gurdeep Blackmon PT at 10/9/2019 11:00 AM     Author: Gurdeep Blackmon PT Service: -- Author Type: Physical Therapist    Filed: 10/9/2019 11:28 AM Encounter Date: 10/9/2019 Status: Signed    : Gurdeep Blackmon PT (Physical Therapist)        SQUAT WITH HIP HINGE - HIP AND BACK DISASSOCIATION DRILL    When squatting, bend over at the waist, tighten your stomach muscles by drawing in your navel and keep your back straight while bending at your hips. This will protect your back from excessive loads.     Your buttock should lower behind your feet as if you are going to sit on a seat. Emphasize your weight going through your heels.     Also, for good knee alignment, do not let your knees pass in front of your toes and keep your knee in line with your 2nd toe (next to the big toe) as it bends.    Hold on to countertop for support.    10 repetitions  1x/day         HAMSTRING STRETCH - SUPINE    While lying on your back, raise up your leg and hold the back of your knee until a stretch is felt.      10-15 repetitions  2x/day

## 2021-06-17 NOTE — PATIENT INSTRUCTIONS - HE
Patient Instructions by Gurdeep Blackmon PT at 10/7/2019 10:30 AM     Author: Gurdeep Blackmon PT Service: -- Author Type: Physical Therapist    Filed: 10/7/2019 11:29 AM Encounter Date: 10/7/2019 Status: Addendum    : Gurdeep Blackmon PT (Physical Therapist)    Related Notes: Original Note by Gurdeep Blackmon PT (Physical Therapist) filed at 10/7/2019 11:28 AM        SINGLE KNEE TO CHEST STRETCH - SKTC    While Lying on your back,  hold your knee and gently pull it up towards your chest.    Gently pull hip back and forth.    10X  3X/day    Alternate:        PELVIC TILT    While lying on your back, use your stomach muscles to press your spine downwards towards the ground. Do not move into a painful position.    X 10  3X/day      LOWER TRUNK ROTATIONS - LTR    Lying on your back with your knees bent, gently move your knees side-to-side.    X 10  3X/day                        PUBIC TUBERCLE CORRECTION    -Lie on your back with knees bent and feet flat.  -Place ball or towel roll between knees and hands on outside of knees.  -Push out against hands for 5 counts, then release and squeeze inward.    5 repetitions  10X  3X/day

## 2021-06-17 NOTE — PATIENT INSTRUCTIONS - HE
Patient Instructions by Ayah Hayes MD at 2/13/2019  2:20 PM     Author: Ayah Hayes MD Service: -- Author Type: Physician    Filed: 2/13/2019  2:46 PM Encounter Date: 2/13/2019 Status: Addendum    : Ayah Hayes MD (Physician)    Related Notes: Original Note by Ayah Hayes MD (Physician) filed at 2/13/2019  2:45 PM         Patient Education     Common Spine and Disk Problems  The most common serious back problems happen when disks tear, bulge, or rupture. In such cases, an injured disk can no longer cushion the vertebrae and absorb shock. As a result, the rest of your spine may also weaken. This can lead to pain, stiffness, and other symptoms.     Torn annulus      Contained herniated disk      Extruded herniated disk     Torn annulus. A sudden movement may cause a tiny tear in an annulus. Nearby ligaments may stretch.    Contained herniated disk. As a disk wears out, the nucleus may bulge into the annulus and press on nerves.    Extruded herniated disk. When a disk ruptures, its nucleus can squeeze out and irritate a nerve.     Arthritis      Instability      Spondylolisthesis     Arthritis. As disks wear out over time, bone spurs form. These growths can irritate nerves and inflame facets.    Instability. As a disk stretches, the vertebrae slip back and forth. This can put pressure on the annulus.    Spondylolisthesis. Listhesis is a condition in which one vertebra has moved forward or backward, in relation to the one above or below it. This causes a crack (stress fracture) in the areas that link the vertebrae together. This may put pressure on the annulus, stretch the disk, and irritate nerves.  Date Last Reviewed: 10/18/2015    [id number] [employer]. [address] [place] [address], [place] [address]. All rights reserved. This information is not intended as a substitute for professional medical care. Always follow your healthcare professional's instructions.

## 2021-06-18 ENCOUNTER — RECORDS - HEALTHEAST (OUTPATIENT)
Dept: ADMINISTRATIVE | Facility: OTHER | Age: 46
End: 2021-06-18

## 2021-06-18 NOTE — LETTER
Letter by Lydia Millan PA-C at      Author: Lydia Millan PA-C Service: -- Author Type: --    Filed:  Date of Service:  Status: (Other)       February 20, 2019     Patient: Dalila Malone   YOB: 1975   Date of Visit: 2/20/2019       To Whom It May Concern:    It is my medical opinion that Dalila Malone may return to light duty 2/22/19 with the following restrictions: no lifting/pushing/pulling more than 10 pounds, no bending/twisting at the waist, must be able to alternate sit to stand every 10-15 minutes, no operating heavy machinery, 4 hr shifts.    If you have any questions or concerns, please don't hesitate to call.    Sincerely,        Lydia Millan PA-C

## 2021-06-18 NOTE — LETTER
Letter by Margie Reyes CNP at      Author: Margie Reyes CNP Service: -- Author Type: --    Filed:  Date of Service:  Status: (Other)       March 11, 2019     Patient: Dalila Malone   YOB: 1975   Date of Visit: 3/11/2019       To Whom It May Concern:    It is my medical opinion that Dalila Malone should remain out of work until 3/13/19, return to work 3/14/2019 with light duty restrictions including 10 pound lifting limit, limiting bending and twisting as tolerated, must be able to alternate sit to stand every 10-15 minutes, 4-hour shifts at that time.  Restrictions through 3/25/2019, will revisit workability at that time    If you have any questions or concerns, please don't hesitate to call.    Sincerely,        Margie Reyes CNP

## 2021-06-18 NOTE — LETTER
Letter by Ayah Hayes MD at      Author: Ayah Hayes MD Service: -- Author Type: --    Filed:  Encounter Date: 2/13/2019 Status: (Other)       February 13, 2019     Patient: Dalila Malone   YOB: 1975   Date of Visit: 2/13/2019       To Whom It May Concern:    It is my medical opinion that Dalila Malone should remain out of work through 2/22/19 due to injury sustained at work.    If you have any questions or concerns, please don't hesitate to call.    Sincerely,        Electronically signed by Ayah Hayes MD

## 2021-06-18 NOTE — LETTER
Letter by Lydia Millan PA-C at      Author: Lydia Millan PA-C Service: -- Author Type: --    Filed:  Date of Service:  Status: (Other)       March 5, 2019     Patient: Dalila Malone   YOB: 1975   Date of Visit: 3/5/2019       To Whom It May Concern:    It is my medical opinion that Dalila Malone may return to light duty immediately with the following restrictions: no lifting/pushing/pulling more than 20 pounds, no repetitive bending/twisting at the waist, must be able to take 5 minute break to sit after every 15 minutes of standing/walking no operating heavy machinery, 8 hr shifts. Workability will be re-evaluated in three weeks.    If you have any questions or concerns, please don't hesitate to call.    Sincerely,        Lydia Millan PA-C

## 2021-06-19 NOTE — LETTER
Letter by Lydia Millan PA-C at      Author: Lydia Millan PA-C Service: -- Author Type: --    Filed:  Date of Service:  Status: (Other)       April 30, 2019     Patient: Dalila Malone   YOB: 1975   Date of Visit: 4/30/2019       To Whom It May Concern:    It is my medical opinion that Dalila Malone may return to light duty immediately with the following restrictions: 10 pound lifting limit, limiting bending and twisting as tolerated, must be able to alternate sit to stand every 10-15 minutes, 4-hour shifts at that time.     If you have any questions or concerns, please don't hesitate to call.    Sincerely,        Lydia Millan PA-C

## 2021-06-19 NOTE — LETTER
Letter by Lydia Millan PA-C at      Author: Lydia Millan PA-C Service: -- Author Type: --    Filed:  Date of Service:  Status: (Other)       April 24, 2019     Patient: Dalila Malone   YOB: 1975   Date of Visit: 4/24/2019       To Whom It May Concern:    It is my medical opinion that Dalila Malone may return to light duty immediately with the following restrictions: 10 pound lifting limit, limiting bending and twisting as tolerated, must be able to alternate sit to stand every 10-15 minutes, 4-hour shifts at that time.     If you have any questions or concerns, please don't hesitate to call.    Sincerely,        Lydia Millan PA-C

## 2021-06-19 NOTE — LETTER
Letter by Helen Smith RN at      Author: Helen Smith RN Service: -- Author Type: --    Filed:  Encounter Date: 7/31/2019 Status: (Other)         July 31, 2019     Patient: Dalila Malone   YOB: 1975   Date of Visit: 7/31/2019       To Whom It May Concern:    It is my medical opinion that Dalila Malone should remain out of work until 8/1/19.    If you have any questions or concerns, please don't hesitate to call.    Sincerely,        Lydia Millan PA-C

## 2021-06-19 NOTE — LETTER
Letter by Lydia Millan PA-C at      Author: Lydia Millan PA-C Service: -- Author Type: --    Filed:  Date of Service:  Status: (Other)       Angelica 3, 2019     Patient: Dalila Malone   YOB: 1975   Date of Visit: 6/3/2019       To Whom It May Concern:    It is my medical opinion that Dalila Malone may return to light duty immediately with the following restrictions: 10 pound lifting limit, limiting bending and twisting as tolerated, must be able to alternate sit to stand every 10-15 minutes, 4-hour shifts at that time.     If you have any questions or concerns, please don't hesitate to call.    Sincerely,        Lydia Millan PA-C

## 2021-06-19 NOTE — LETTER
Letter by Lydia Millan PA-C at      Author: Lydia Millan PA-C Service: -- Author Type: --    Filed:  Date of Service:  Status: (Other)       March 26, 2019     Patient: Dalila Malone   YOB: 1975   Date of Visit: 3/26/2019       To Whom It May Concern:    It is my medical opinion that Dalila Malone may return to light duty immediately with the following restrictions: 10 pound lifting limit, limiting bending and twisting as tolerated, must be able to alternate sit to stand every 10-15 minutes, 4-hour shifts at that time. Workability will be re-evaluated in approximately 3 weeks.    If you have any questions or concerns, please don't hesitate to call.    Sincerely,        Lydia Millan PA-C

## 2021-06-19 NOTE — LETTER
Letter by Lydia Millan PA-C at      Author: Lydia Millan PA-C Service: -- Author Type: --    Filed:  Date of Service:  Status: (Other)       July 29, 2019     Patient: Dalila Malone   YOB: 1975   Date of Visit: 7/29/2019       To Whom It May Concern:    It is my medical opinion that Dalila Malone should be excused from work 7/29/19-7/30/19.  He may return to light duty 7/31/19 with the following restrictions: 10 pound lifting limit, limiting bending and twisting as tolerated, must be able to alternate sit to stand every 10-15 minutes, 4-hour shifts at that time. Patient cannot use the cherry-. Work restrictions will be re-evaluated in approximately 2-3 weeks.    If you have any questions or concerns, please don't hesitate to call.    Sincerely,        Lydia Millan PA-C

## 2021-06-19 NOTE — LETTER
Letter by Lydia Millan PA-C at      Author: Lydia Millan PA-C Service: -- Author Type: --    Filed:  Date of Service:  Status: (Other)       September 11, 2019     Patient: Dalila Malone   YOB: 1975   Date of Visit: 9/11/2019       To Whom It May Concern:    It is my medical opinion that Dalila Malone may return to light duty immediately with the following restrictions: 15 pound lifting limit, avoid repetitive bending and twisting as tolerated, must be able to alternate sit to stand every 30 minutes, 6-hour shifts at that time.   Work restrictions will be re-evaluated in approximately 4 weeks..    If you have any questions or concerns, please don't hesitate to call.    Sincerely,        Lydia Millan PA-C

## 2021-06-19 NOTE — LETTER
Letter by Lydia Millan PA-C at      Author: Lydia Millan PA-C Service: -- Author Type: --    Filed:  Date of Service:  Status: (Other)       August 27, 2019     Patient: Dalila Malone   YOB: 1975   Date of Visit: 8/27/2019       To Whom It May Concern:    It is my medical opinion that Dalila Malone may return to light duty immediately with the following restrictions: 10 pound lifting limit, limiting bending and twisting as tolerated, must be able to alternate sit to stand every 10-15 minutes, 4-hour shifts at that time.  Mr. Malone will work from 11:00 - 3:00.  Patient cannot use the cherry-. Work restrictions will be re-evaluated in approximately 4 weeks.    If you have any questions or concerns, please don't hesitate to call.    Sincerely,        Lydia Millan PA-C

## 2021-06-19 NOTE — LETTER
Letter by Lydia Millan PA-C at      Author: Lydia Millan PA-C Service: -- Author Type: --    Filed:  Date of Service:  Status: (Other)       July 2, 2019     Patient: Dalila Malone   YOB: 1975   Date of Visit: 7/2/2019       To Whom It May Concern:    It is my medical opinion that Dalila Malone should be excused from work 7/2/19-7/4/19.  He may return to light duty 7/5/19 with the following restrictions: 10 pound lifting limit, limiting bending and twisting as tolerated, must be able to alternate sit to stand every 10-15 minutes, 4-hour shifts at that time. Patient cannot use the cherry-.    If you have any questions or concerns, please don't hesitate to call.    Sincerely,        Lydia Millan PA-C

## 2021-06-19 NOTE — LETTER
Letter by Lydia Millan PA-C at      Author: Lydia Millan PA-C Service: -- Author Type: --    Filed:  Date of Service:  Status: (Other)       July 22, 2019     Patient: Dalila Malone   YOB: 1975   Date of Visit: 7/22/2019       To Whom It May Concern:    It is my medical opinion that Dalila Malone should be excused from work 7/22/19.  [facility].  He may return to light duty 7/23/19 with the following restrictions: 10 pound lifting limit, limiting bending and twisting as tolerated, must be able to alternate sit to stand every 10-15 minutes, 4-hour shifts at that time. Patient cannot use the cherry-. Work restrictions will be re-evaluated in approximately three weeks.    If you have any questions or concerns, please don't hesitate to call.    Sincerely,        Lydia Millan PA-C

## 2021-06-19 NOTE — LETTER
Letter by Lydia Millan PA-C at      Author: Lydia Millan PA-C Service: -- Author Type: --    Filed:  Encounter Date: 7/30/2019 Status: (Other)         July 31, 2019     Patient: Dalila Malone   YOB: 1975   Date of Visit: 7/30/2019       To Whom It May Concern:      July 30, 2019     Patient: Dalila Malone   YOB: 1975   Date of Visit: 7/30/2019       To Whom It May Concern:    It is my medical opinion that Dalila Malone should remain out of work until 8/1/19.    If you have any questions or concerns, please don't hesitate to call.    Sincerely,        Lydia Millan PA-C

## 2021-06-19 NOTE — LETTER
Letter by Lydia Millan PA-C at      Author: Lydia Millan PA-C Service: -- Author Type: --    Filed:  Date of Service:  Status: Signed       November 18, 2019     Patient: Dalila Malone   YOB: 1975   Date of Visit: 11/18/2019       To Whom It May Concern:    It is my medical opinion that Dalila Malone may return to light duty immediately with the following restrictions: 25 pound lifting limit, avoid repetitive bending and twisting as tolerated, must be able to alternate sit to stand every 15 minutes, 6-hour shifts at that time.   Work restrictions will be re-evaluated in approximately 4 weeks.    If you have any questions or concerns, please don't hesitate to call.    Sincerely,        Lydia Millan PA-C

## 2021-06-19 NOTE — LETTER
Letter by Christine Gold DO at      Author: Christine Gold DO Service: -- Author Type: --    Filed:  Date of Service:  Status: (Other)       April 9, 2019     Patient: Dalila Malone   YOB: 1975   Date of Visit: 4/9/2019       To Whom It May Concern:    Dalila Malone was here today Tuesday April 9, 2019 for a medical procedure.    If you have any questions or concerns, please don't hesitate to call.    Sincerely,        Sandy Hardy RN

## 2021-06-19 NOTE — LETTER
Letter by Jennifer Pope RN at      Author: Jennifer Pope RN Service: -- Author Type: --    Filed:  Encounter Date: 7/30/2019 Status: (Other)         July 30, 2019     Patient: Dalila Malone   YOB: 1975   Date of Visit: 7/30/2019       To Whom It May Concern:    It is my medical opinion that Dalila Malone should remain out of work until 8/2/19.    If you have any questions or concerns, please don't hesitate to call.    Sincerely,        Lydia Millan PA-C

## 2021-06-19 NOTE — LETTER
Letter by Lydia Millan PA-C at      Author: Lydia Millan PA-C Service: -- Author Type: --    Filed:  Date of Service:  Status: (Other)       August 14, 2019     Patient: Dalila Malone   YOB: 1975   Date of Visit: 8/14/2019       To Whom It May Concern:    It is my medical opinion that Dalila Malone should be excused from work 8/14/19.  He may return to light duty 8/15/19 with the following restrictions: 10 pound lifting limit, limiting bending and twisting as tolerated, must be able to alternate sit to stand every 10-15 minutes, 4-hour shifts at that time.  Mr. Malone will work from 11:00 - 3:00.  Patient cannot use the cherry-. Work restrictions will be re-evaluated in approximately 4 weeks.    If you have any questions or concerns, please don't hesitate to call.    Sincerely,        Lydia Millan PA-C

## 2021-06-19 NOTE — LETTER
Letter by Ayah Hayes MD at      Author: Ayah Hayes MD Service: -- Author Type: --    Filed:  Encounter Date: 7/19/2019 Status: (Other)         July 19, 2019     Patient: Dalila Malone   YOB: 1975   Date of Visit: 7/19/2019       To Whom It May Concern:    It is my medical opinion that Dalila Malone was unable to work due to illness 7/17-7/19/19.    If you have any questions or concerns, please don't hesitate to call.    Sincerely,        Electronically signed by Ayah Hayes MD

## 2021-06-19 NOTE — LETTER
Letter by Lydia Millan PA-C at      Author: Lydia Millan PA-C Service: -- Author Type: --    Filed:  Date of Service:  Status: (Other)       June 24, 2019     Patient: Dalila Malone   YOB: 1975   Date of Visit: 6/24/2019       To Whom It May Concern:    It is my medical opinion that Dalila Malone should be excused from work 6/24-19-6/26/19.  He may return to light duty 6/27/19 with the following restrictions: 10 pound lifting limit, limiting bending and twisting as tolerated, must be able to alternate sit to stand every 10-15 minutes, 4-hour shifts at that time.     If you have any questions or concerns, please don't hesitate to call.    Sincerely,        Lydia Millan PA-C

## 2021-06-19 NOTE — LETTER
Letter by Lydia Millan PA-C at      Author: Lydia Millan PA-C Service: -- Author Type: --    Filed:  Date of Service:  Status: (Other)       July 2, 2019     Patient: Dalila Malone   YOB: 1975   Date of Visit: 7/2/2019       To Whom It May Concern:    It is my medical opinion that Dalila Malone may return to light duty 7/5/19 with the following restrictions: 10 pound lifting limit, limiting bending and twisting as tolerated, must be able to alternate sit to stand every 10-15 minutes, 4-hour shifts at that time. Patient cannot use the cherry-.    If you have any questions or concerns, please don't hesitate to call.    Sincerely,        Lydia Millan PA-C

## 2021-06-20 NOTE — LETTER
Letter by Margie Reyes CNP at      Author: Margie Reyes CNP Service: -- Author Type: --    Filed:  Date of Service:  Status: Signed       December 23, 2019     Patient: Dalila Malone   YOB: 1975   Date of Visit: 12/23/2019       To Whom It May Concern:    It is my medical opinion that Dalila Malone may return to light duty immediately with the following restrictions:  25 pound lifting limit, avoid repetitive bending and twisting as tolerated, must be able to alternate sit to stand every 15 minutes, 4-hour shifts at this time.   Work restrictions will be re-evaluated in approximately 4 weeks..    If you have any questions or concerns, please don't hesitate to call.    Sincerely,        Margie Reyes CNP

## 2021-06-20 NOTE — LETTER
Letter by Elina Banks MD at      Author: Elina Banks MD Service: -- Author Type: --    Filed:  Encounter Date: 4/28/2020 Status: (Other)         April 28, 2020     Patient: Dalila Malone   YOB: 1975   Date of Visit: 4/28/2020       To Whom It May Concern:    It is my medical opinion that Dalila Malone may return to work as of 4/29.     If you have any questions or concerns, please don't hesitate to call.    Sincerely,        Electronically signed by Elina Banks MD

## 2021-06-20 NOTE — LETTER
Letter by Lydia Millan PA-C at      Author: Lydia Millan PA-C Service: -- Author Type: --    Filed:  Date of Service:  Status: Signed       January 16, 2020     Patient: Dalila Malone   YOB: 1975   Date of Visit: 1/16/2020       To Whom It May Concern:    It is my medical opinion that Dalila Malone may return to light duty immediately with the following restrictions: 25 pound lifting limit, avoid repetitive bending and twisting as tolerated, must be able to alternate sit to stand every 15 minutes, 8-hour shifts at that time.  Restrictions should remain in place until functiona capacity evaluation .    If you have any questions or concerns, please don't hesitate to call.    Sincerely,        Lydia Millan PA-C

## 2021-06-20 NOTE — LETTER
Letter by Elina Banks MD at      Author: Elina Banks MD Service: -- Author Type: --    Filed:  Encounter Date: 3/4/2020 Status: (Other)         Dalila Malone  [address] [place] [address]     March 4, 2020         Dear Mr. Malone,    Below are the results from your recent visit:    Resulted Orders   Comprehensive Metabolic Panel   Result Value Ref Range    Sodium 140 136 - 145 mmol/L    Potassium 3.7 3.5 - 5.0 mmol/L    Chloride 102 98 - 107 mmol/L    CO2 27 22 - 31 mmol/L    Anion Gap, Calculation 11 5 - 18 mmol/L    Glucose 79 70 - 125 mg/dL    BUN 13 8 - 22 mg/dL    Creatinine 1.15 0.70 - 1.30 mg/dL    GFR MDRD Af Amer >60 >60 mL/min/1.73m2    GFR MDRD Non Af Amer >60 >60 mL/min/1.73m2    Bilirubin, Total 0.6 0.0 - 1.0 mg/dL    Calcium 9.5 8.5 - 10.5 mg/dL    Protein, Total 8.0 6.0 - 8.0 g/dL    Albumin 4.4 3.5 - 5.0 g/dL    Alkaline Phosphatase 98 45 - 120 U/L    AST 32 0 - 40 U/L    ALT 66 (H) 0 - 45 U/L    Narrative    Fasting Glucose reference range is 70-99 mg/dL per  American Diabetes Association (ADA) guidelines.   Lipid Profile   Result Value Ref Range    Triglycerides 423 (H) <=149 mg/dL    Cholesterol 296 (H) <=199 mg/dL    LDL Calculated        Comment:      Invalid, Triglycerides >400    HDL Cholesterol 42 >=40 mg/dL    Patient Fasting > 8hrs? No    HM2(CBC w/o Differential)   Result Value Ref Range    WBC 10.7 4.0 - 11.0 thou/uL    RBC 5.28 4.40 - 6.20 mill/uL    Hemoglobin 16.9 14.0 - 18.0 g/dL    Hematocrit 49.7 40.0 - 54.0 %    MCV 94 80 - 100 fL    MCH 31.9 27.0 - 34.0 pg    MCHC 33.9 32.0 - 36.0 g/dL    RDW 11.4 11.0 - 14.5 %    Platelets 242 140 - 440 thou/uL    MPV 8.2 7.0 - 10.0 fL       Most of your testing was normal except for your total cholesterol (296, normal is less than 200) and triglycerides (423, normal is less than 150).     Ways to help decrease these numbers are to eat more plant-based foods (especially vegetables) and eat less processed food like pasta, white  bread, white rice or crackers. Eating whole-grain versions like whole grain bread or brown rice is healthier. You can also decrease these numbers by increasing your physical activity, like by walking more.      Please call with questions or contact us using [employer].    Sincerely,        Electronically signed by Elina Banks MD

## 2021-06-20 NOTE — LETTER
Letter by Elina Banks MD at      Author: Elina Banks MD Service: -- Author Type: --    Filed:  Encounter Date: 3/4/2020 Status: (Other)         Torreynavid Malone  [address] [place] [address]             March 5, 2020         Dear . Faisal,    Below are the results from your recent visit:    Your testing for dust allergies was negative.       Please call with questions or contact us using [employer].    Sincerely,        Electronically signed by Elina Banks MD

## 2021-06-20 NOTE — LETTER
Letter by Lydia Millan PA-C at      Author: Lydia Millan PA-C Service: -- Author Type: --    Filed:  Date of Service:  Status: Signed       January 7, 2020     Patient: Dalila Malone   YOB: 1975   Date of Visit: 1/7/2020       To Whom It May Concern:    It is my medical opinion that Dalila Malone will remain off of work for the next 1 week.  Additional work restrictions to be determined after MRI lumbar spine.    If you have any questions or concerns, please don't hesitate to call.    Sincerely,        Lydia Millan PA-C

## 2021-06-20 NOTE — LETTER
Letter by Lydia Millan PA-C at      Author: Lydia Millan PA-C Service: -- Author Type: --    Filed:  Date of Service:  Status: Signed       December 17, 2019     Patient: Dalila Malone   YOB: 1975   Date of Visit: 12/17/2019       To Whom It May Concern:    It is my medical opinion that Dalila Malone may return to light duty immediately with the following restrictions: 25 pound lifting limit, avoid repetitive bending and twisting as tolerated, must be able to alternate sit to stand every 15 minutes, 8-hour shifts at that time.   Work restrictions will be re-evaluated in approximately 4 weeks.    If you have any questions or concerns, please don't hesitate to call.    Sincerely,        Lydia Millan PA-C

## 2021-06-20 NOTE — LETTER
Letter by Elina Banks MD at      Author: Elina Banks MD Service: -- Author Type: --    Filed:  Encounter Date: 9/2/2020 Status: (Other)         September 2, 2020     Patient: Dalila Malone   YOB: 1975   Date of Visit: 9/2/2020       To Whom It May Concern:    It is my medical opinion that Dalila Malone may return to work effectively immediately.    If you have any questions or concerns, please don't hesitate to call.    Sincerely,        Electronically signed by Elina Banks MD

## 2021-06-21 NOTE — LETTER
Letter by Elina Banks MD at      Author: Elina Banks MD Service: -- Author Type: --    Filed:  Encounter Date: 4/19/2021 Status: (Other)         April 19, 2021     Patient: Dalila Malone   YOB: 1975   Date of Visit: 4/19/2021       To Whom It May Concern:    It is my medical opinion that Dalila Malone should remain out of work until Monday, 4/26/21..    If you have any questions or concerns, please don't hesitate to call.    Sincerely,        Electronically signed by Elina Banks MD

## 2021-06-24 ENCOUNTER — COMMUNICATION - HEALTHEAST (OUTPATIENT)
Dept: FAMILY MEDICINE | Facility: CLINIC | Age: 46
End: 2021-06-24

## 2021-06-24 ENCOUNTER — COMMUNICATION - HEALTHEAST (OUTPATIENT)
Dept: SCHEDULING | Facility: CLINIC | Age: 46
End: 2021-06-24

## 2021-06-24 ENCOUNTER — HOSPITAL ENCOUNTER (EMERGENCY)
Dept: EMERGENCY MEDICINE | Facility: HOSPITAL | Age: 46
Discharge: HOME | End: 2021-06-24
Attending: EMERGENCY MEDICINE
Payer: COMMERCIAL

## 2021-06-24 DIAGNOSIS — R10.32 ABDOMINAL PAIN, LEFT LOWER QUADRANT: ICD-10-CM

## 2021-06-24 DIAGNOSIS — K63.89 EPIPLOIC APPENDAGITIS: ICD-10-CM

## 2021-06-24 LAB
ANION GAP SERPL CALCULATED.3IONS-SCNC: 8 MMOL/L (ref 5–18)
BUN SERPL-MCNC: 12 MG/DL (ref 8–22)
CALCIUM SERPL-MCNC: 8.9 MG/DL (ref 8.5–10.5)
CHLORIDE BLD-SCNC: 107 MMOL/L (ref 98–107)
CO2 SERPL-SCNC: 23 MMOL/L (ref 22–31)
CREAT SERPL-MCNC: 0.79 MG/DL (ref 0.7–1.3)
ERYTHROCYTE [DISTWIDTH] IN BLOOD BY AUTOMATED COUNT: 12.6 % (ref 11–14.5)
GFR SERPL CREATININE-BSD FRML MDRD: >60 ML/MIN/1.73M2
GLUCOSE BLD-MCNC: 91 MG/DL (ref 70–125)
HCT VFR BLD AUTO: 46.3 % (ref 40–54)
HGB BLD-MCNC: 15.3 G/DL (ref 14–18)
MCH RBC QN AUTO: 30.7 PG (ref 27–34)
MCHC RBC AUTO-ENTMCNC: 33 G/DL (ref 32–36)
MCV RBC AUTO: 93 FL (ref 80–100)
PLATELET # BLD AUTO: 219 THOU/UL (ref 140–440)
PMV BLD AUTO: 10.7 FL (ref 8.5–12.5)
POTASSIUM BLD-SCNC: 4.3 MMOL/L (ref 3.5–5)
RBC # BLD AUTO: 4.98 MILL/UL (ref 4.4–6.2)
SODIUM SERPL-SCNC: 138 MMOL/L (ref 136–145)
WBC: 9.2 THOU/UL (ref 4–11)

## 2021-06-24 NOTE — TELEPHONE ENCOUNTER
Phone call to patient to review results and provider's recommendations. Results given and explained. Patient reports he is doing PT and has 4 or 5 more sessions to go.     Encouraged patient to return for follow up if pain does not resolve or it worsens/new symptoms. Stated understanding.

## 2021-06-24 NOTE — TELEPHONE ENCOUNTER
Question following Office Visit  When did you see your provider: Today  What is your question: Rocio from [facility], would like to know if it is okay for patient to do light duty at work. Caller stated they just talked to patient and received the work note that was written today by Dr. Hayes.    Fax:  [phone]  Okay to leave a detailed message: Yes  [phone]

## 2021-06-24 NOTE — TELEPHONE ENCOUNTER
I left a message on the line below that he is not able to work at all currently.  He could barely get here in the car and can hardly walk due to his pain.  Hopefully he will improve in the near future and be able to return.

## 2021-06-24 NOTE — PROGRESS NOTES
Assessment:   Dalila Malone is a 44 y.o. y.o. male with past medical history significant for obesity, nicotine use who presents today for follow-up regarding acute and severe right greater than left low back pain with paresthesias radiating into the buttocks as well as upper extremity paresthesias which began as a result of a work injury on February 11, 2019.  I had ordered an MRI lumbar spine when I saw the patient on February 20, 2019.  Patient had this completed at [facility] but they did not send us the results.  We had the [facility] while he was here at the clinic so those results will be sent to our clinic.  Patient reports almost 100% resolution of his pain over the past 2 weeks with physical therapy and prednisone.  He has only mild residual bilateral low back pain.  Paresthesias in left arm and both legs have completely resolved.  He is neurologically intact.       Plan:     A shared decision making plan was used.  The patient's values and choices were respected.  The following represents what was discussed and decided upon by the physician assistant and the patient.      1.  DIAGNOSTIC TESTS: I had ordered an MRI lumbar spine without the patient in consultation on February 20th, 2019.  This was done at [facility].  Patient called [facility] while he was here at the clinic and asked that they fax the results to our clinic.    2.  PHYSICAL THERAPY: Patient is currently in physical therapy.  He is going to a facility called [facility].  He has had 4 sessions so far.  This is been very helpful.  They have been applying electrical stimulation which has provided significant relief of his pain.  He has 2 more sessions scheduled.  I entered a new order so that the patient will continue with the physical therapy program for an additional 4-6 sessions.  I would like him to focus on core strengthening.    3.  MEDICATIONS:  -Patient is taking gabapentin 300 mg in the morning of  300 mg afternoon, and 600 mg at bedtime.  Patient would like to get off of this medication.  I told him he can wean off of medication by limited to 1 capsule every other day.  As long as pain does not worsen, he can wean off the medication completely.  If pain does worsen, he should continue with the lowest possible dose for another 2 weeks before attempting to wean off again.  -Patient can continue using ibuprofen 600 mg daily.  -Patient can continue using lidocaine patches.    4.  INTERVENTIONS: No interventions were ordered.    5.  PATIENT EDUCATION: I provided an updated work note for the patient loosening his restrictions.  He may continue with light duty work with the following instructions: No lifting more than 20 pounds, no repetitive bending and twisting at the waist, must be able to take 5-minute break to sit after every 15 minutes of standing/walking, no operating heavy machinery, 8-hour shifts.  -Hopefully after the patient completes additional sessions of physical therapy, we will be able to release the patient back to full duty work.    6.  FOLLOW-UP: Patient will follow up with me in 3 weeks.  If you have any questions or concerns in the meantime, he should not hesitate to contact the clinic.    Subjective:     Dalila Malone is a 44 y.o. male who presents today for follow-up regarding low back pain which began as result of a work injury on February 11, 2019.  Patient felt low back pain with numbness and tingling in both buttocks.  He also experienced paresthesias in the left upper extremity.  I saw the patient in consultation of February 20, 2019.  At that time I referred the patient to physical therapy.  He is going to physical therapy at [facility].  I also ordered an MRI lumbar spine.  This was apparently done at [facility], however, they did not send us the report.  Also prescribed a 5-day course of prednisone and gabapentin.  Patient reports that she has had almost 100% resolution of his  pain.  The patient reports that at his first physical therapy session his physical therapist applied electrical stimulation to the lower back.  He had immediate improvement in his pain from a 10 out of 10 to a 5 out of 10.  Pain has continued to improve since then.      Patient complains of only mild residual intermittent bilateral low back pain.  Pain is located along the lumbar paraspinous muscles bilaterally.  He states that this pain only occurs if he stands or walks for more than 10-15 minutes.  The pain improves with sitting.  He denies any pain radiating to the buttocks or down the legs.  Numbness and tingling in the buttocks has resolved.  Left upper extremity paresthesias of also resolved completely.  He rates his pain today is a 0-10.  At its best it is a 0-10.  At its worst it is a 0 out of 10.  He denies any new symptoms since he was last seen.    Patient is going to physical therapy. He has had 4 sessions of our.  He does his home exercises.  He has 2 more sessions remaining.  He would like to continue with physical therapy after that.  He is using gabapentin 300 mg the morning, 300 mg afternoon, 600 mg at bedtime.  He is also using Avapro from 610 mg daily.  He is using lidocaine patches as needed.  He completed his course of prednisone with improvement.    Patient is currently working as a .  He is working with light duty restrictions that I outlined in my consultation.  He feels able to loosen the restrictions.    Past medical history is reviewed and is unchanged in the interim.    Family history is reviewed and is unchanged in the interim.    Review of Systems:  Positive for headache.  Negative for numbness/tingling, loss of bowel/bladder control, foot drop, weakness, dizziness, nausea/vomiting, blurry vision, balance changes.     Objective:   CONSTITUTIONAL:  Vital signs as above.  No acute distress.  The patient is well nourished and well groomed.    PSYCHIATRIC:  The patient is  awake, alert, oriented to person, place and time.  The patient is answering questions appropriately with clear speech.  Normal affect.  HEENT: Normocephalic, atraumatic.  Sclera clear.    SKIN:  Skin over the face, posterior torso, bilateral upper and lower extremities is clean, dry, intact without rashes.  MUSCULOSKELETAL:  Gait is non-antalgic.  The patient is able to heel and toe walk without any difficulty.  No significant tenderness over the bilateral lower lumbar paraspinal muscles.  Patient does have some hypertonicity in the bilateral shoulder abductors, elbow flexors/extensors, wrist extensors, finger flexors/abductors, wrist extensors, lumbar paraspinous muscles.  Lumbar range of motion is full.    The patient has 5/5 strength for the bilateral hip flexors, knee flexors/extensors, ankle dorsiflexors/plantar flexors, ankle evertors/invertors.    NEUROLOGICAL: 1-2+ biceps, triceps, brachial radialis, patellar, achilles reflexes which are symmetric bilaterally.  No ankle clonus bilaterally.  Sensation to light touch is intact in the bilateral upper extremities throughout and in the bilateral L4, L5, and S1 dermatomes.

## 2021-06-24 NOTE — TELEPHONE ENCOUNTER
----- Message from Lydia Millan PA-C sent at 3/5/2019  4:08 PM CST -----  Regarding: MRI results  Please call the patient let him know I reviewed his MRI lumbar spine.  This does show a small disc bulge at L4-5 which I believe is the source of his pain.  This disc bulge should heal with time and physical therapy.

## 2021-06-24 NOTE — PATIENT INSTRUCTIONS - HE
Gabapentin 300mg Dosing Chart    DATE  MORNING AFTERNOON BEDTIME    Day 1 0 0 1    Day 2 0 0 1    Day 3 0 0 1    Day 4 1 0 1    Day 5 1 0 1    Day 6 1 0 1    Day 7 1 1 1    Day 8 1 1 1    Day 9 1 1 1    Day 10 1 1 2    Day 11 1 1 2    Day 12 1 1 2    Day 13 2 1 2    Day 14 2 1 2    Day 15 2 1 2    Day 16 2 2 2    Day 17 2 2 2    Day 18 2 2 2    Day 19 2 2 3    Day 20 2 2 3    Day 21 2 2 3    Day 22 3 2 3    Day 23 3 2 3    Day 24 3 2 3    Day 25 3 3 3    Day 26 3 3 3    Day 27 3 3 3     Continue medication, taking 3 capsules three times daily    Please call if you have any questions regarding how to take your medication  Clinic Phone # [phone]        [facility] Locations    Please call [phone] to schedule your image(s) (select option #1 and then #2). There are 3 different locations, see below. You can do walk-in visits for xray only images if you want.     [address] [address]    [facility]   [address] [address]    [place] [address] [facility] [address]

## 2021-06-24 NOTE — TELEPHONE ENCOUNTER
"Call from pt to report worsening pain. Pt reports \"my pain has come back in the same areas. It's not as bad but I just don't want it to get as bad as it was before\". Pt reports his pain is a 3/10, with his worst pain a 6/10. He was unable to sleep last night. He is utilizing ibuprofen, lidocaine patches and gabapentin (however he is trying to wean off this medication). Pt informed he could alternate ibuprofen with ES tylenol, 2 500mg tabs 3x/day. Pt verbalized understanding.     He is currently in physical therapy and his last session was yesterday. He states \"usually after PT I am sore and so I take a hot bath and the soreness goes away. This time it didn't go away and my pain came back\". He has left a message for his PT as well. Pt inquiring if he should come in for follow-up with provider or if there are any other recommendations at this time. Pt informed that provider will be given an update on his status.   "

## 2021-06-24 NOTE — PROGRESS NOTES
"ASSESSMENT/PLAN:    Problem List Items Addressed This Visit     Acute midline thoracic back pain - Primary     Patient still having severe pain 4 days out from initial injury. May well have thoracolumbar disc disease, although muscular strain is possible.    He may continue ibuprofen and lidocaine patch; will add cyclobenzaprine as well.  We discussed the usual clinical course of such injuries, including general treatment options.    He is completely unable to work at this time (he can barely move around the room or tolerate riding in a car), so I've written him a work note.Will try to get him in to Spine Clinic asap for further evaluation and treatment, to help with shortening time to recovery of function and workability.         Relevant Medications    cyclobenzaprine (FLEXERIL) 10 MG tablet    Other Relevant Orders    Ambulatory referral to Spine Care      Other Visit Diagnoses     Borderline high blood pressure        Likely due to acute pain.  Recommend routine health maintainance visit to f/u when recovered from acute condition.           No Follow-up on file.     SUBJECTIVE:  Dalila Malone is a 44 y.o. male here with his wife for back pain.  He is a new patient to our clinic.    He reports that four days ago he felt a \"pinch\" in his lower back while he was at work and pulling on something.  About 30 minutes later, he started to develop a burning in his back and then it worsened to where it became severe.    He was seen in ER 2/12/19.  Records reviewed:  Xray of thoracic spine was normal. Neuro exam was normal. He was dx'd with likely muscle spasm/strain.  He was given toradol, diazepam, and lidocaine patch while in the department; rx'd with ibuprofen and lidocine patch to use at home.    Today, he is neither better nor worse than at ER.  Feels like the pain  Is mid and lower back, sometimes radiating to left arm.  No weakness or numbness.  No loss of bowel or bladder.  Never had similar sx previously.    Pain it " "just a little better with ibuprofen and lidocaine patch.  Worse with changing positions and riding in car over bumps.  Very hard to find a good sleeping position and is not sleeping well at all because of it.       The following portions of the patient's history were reviewed and updated as appropriate: allergies, current medications and problem list, surgical hx (none), medical hx (none).  Current Outpatient Medications on File Prior to Visit   Medication Sig Dispense Refill     ibuprofen (ADVIL,MOTRIN) 600 MG tablet Take 1 tablet (600 mg total) by mouth every 6 (six) hours as needed for pain. 20 tablet 0     lidocaine (LIDODERM) 5 % Remove & Discard patch within 12 hours or as directed by MD 5 patch 0     No current facility-administered medications on file prior to visit.          Social History     Tobacco Use   Smoking Status Current Every Day Smoker   Smokeless Tobacco Never Used       OBJECTIVE:  :  /80   Pulse 93   Temp 98.1  F (36.7  C) (Oral)   Resp 24   Ht 5' 7.32\" (1.71 m)   Wt (!) 222 lb 8 oz (100.9 kg)   SpO2 96%   BMI 34.51 kg/m    Wt Readings from Last 3 Encounters:   02/13/19 (!) 222 lb 8 oz (100.9 kg)   02/12/19 210 lb (95.3 kg)         Gen:  A&A.  Appears uncomfortable, but communicates very effectively.  Stands and walks while carrying himself a a stiff bent position.  Musculoskeletal:  Moderate pain over thoracic spine, none over lumbar spine.  Neuro: DTR's 2+ throughout.  Skin: No rash or skin changes over back.    "

## 2021-06-24 NOTE — TELEPHONE ENCOUNTER
Filled out what could, attached OV notes PCP and [facility]ine [facility] notes.  Placed on computer desk. Thanks.

## 2021-06-24 NOTE — TELEPHONE ENCOUNTER
Faxing papers but fax not going through.  Called [place] to see if number is correct. It is.  Will keep trying to fax, otherwise will need to mail.  Thanks.

## 2021-06-24 NOTE — PATIENT INSTRUCTIONS - HE
Eliminate one capsule every other day.  As long as pain doesn't worsen, wean off completely.  If pain does worsen, continue at lowest dose needed for another 2 weeks.

## 2021-06-24 NOTE — TELEPHONE ENCOUNTER
"Call to pt with provider's response. Pt's pain began last night. He states he was \"ok during the day but then after physical therapy it got worse\". Provider's recommendations given. Pt verbalized understanding. He would like to try cyclobenzaprine at bedtime as needed. Pharmacy verified and order prepped for provider review. Pt reports he does not want nurse to call on Monday. He will call clinic and check in if needed on Monday.   "

## 2021-06-24 NOTE — TELEPHONE ENCOUNTER
Need Certification of Health Care Provider form filled out. Form is in blue folder. Needed by 03/01/2019 fax form to [phone]

## 2021-06-24 NOTE — PATIENT INSTRUCTIONS - HE
.A Medrol Dose Pack (Prednisone Taper) was prescribed today for your acute pain. Please follow the dosing instruction on prescription bottle.     POSSIBLE STEROID SIDE EFFECTS   -If you experience these, it should only last for a short period-   Swelling   Increased appetite   Skin redness (flushness)   Skin rash   Mouth (oral) irritation   Blood sugar (glucose) levels   Sweats   Mood changes    Discussed the importance of core strengthening, ROM, stretching exercises with the patient and how each of these entities is important in decreasing pain.  Explained to the patient that the purpose of physical therapy is to teach the patient a home exercise program.  These exercises need to be performed every day in order to decrease pain and prevent future occurrences of pain.        ~Please call Nurse Navigation line [phone] with any questions or concerns about your treatment plan, if symptoms worsen and you would like to be seen urgently, or if you have problems controlling bladder and bowel function.  ~Follow Up Appointment time slots with Margie Reyes CNP with the [facility], are also available at the [facility] location near [facility] on the first and third THURSDAY afternoons of each month.

## 2021-06-24 NOTE — TELEPHONE ENCOUNTER
I've complete the [facility] paperwork. I added a note that future paperwork should be done by the [facility].    Did they request office notes?  If not, they should not be sent.  If they want office notes, we will a ELIAS.    Ok to send the note from [facility] from 2/20/19.  I've attached it to the [facility] paperwork.     Be sure to keep a copy of the record to scan into Epic.

## 2021-06-24 NOTE — TELEPHONE ENCOUNTER
Could you clarify when pain worsened?  Just last night?     I recommend patient remain on his regular dose of gabapentin. Agree with alternating ibuprofen and tylenol.  We can add a muscle relaxant to use at bedtime.  I would recommend cyclobenzaprine 5-10 mg at bedtime as needed.  Rest over weekend, use ice, keep doing HEP.      Nurse call on Monday.  If patient still having severe pain he should be seen for a visit.

## 2021-06-24 NOTE — PROGRESS NOTES
ASSESSMENT: Dalila Malone is a 44 y.o. male with past medical history significant for obesity, nicotine use who presents today for new patient evaluation of acute and severe right greater than left low back pain with paresthesias radiating into the buttocks as well as upper extremity paresthesias which began as a result of a work injury on February 11, 2019.  Patient has not had any imaging of the lumbar spine.  He did have an x-ray of the thoracic spine at the emergency department which was unremarkable.  I suspect that the patient has a central disc herniation.  He also has significant myofascial pain.  Etiology of intermittent left upper extremity symptoms is unclear.  He does not have any neck pain or cervical radicular pain.  He could have a trigger point due to significant hypertonicity paraspinous musculature.  On exam today, patient demonstrated weakness in the right hip flexors and right knee flexors/extensors.     JO ANN: 78  Who 5: 1    PLAN:  A shared decision making model was used.  The patient's values and choices were respected.  The following represents what was discussed and decided upon by the physician assistant and the patient.      1.  DIAGNOSTIC TESTS: I reviewed the x-ray thoracic spine.  I ordered an MRI lumbar spine given the patient's weakness on exam.  He is debilitated by his pain.  He has not been able to work because of his pain.  He has not been able to perform ADLs independently.    2.  PHYSICAL THERAPY:  Discussed the importance of core strengthening, ROM, stretching exercises with the patient and how each of these entities is important in decreasing pain.  Explained to the patient that the purpose of physical therapy is to teach the patient a home exercise program.  These exercises need to be performed every day in order to decrease pain and prevent future occurrences of pain.  Entered a referral for the patient to begin physical therapy.    3.  MEDICATIONS:    -Prednisone 50 mg daily  times 5 days was prescribed.  I asked the patient to stop using ibuprofen while he is taking the prednisone.  He could resume ibuprofen after he completes the course of prednisone.  - Gabapentin 300 mg was prescribed.  He was given the dosage titration chart.  He may increase his dose up to maximum of 900 mg 3 times daily.  -Patient has Flexeril available which she could take as needed.  This does cause significant drowsiness.  -Patient can continue using lidocaine patches as needed.  -I offered a prescription for Vicodin to the patient.  He declined.  He would prefer to avoid opiates.  He would like to try the prednisone and gabapentin first.  I told the patient that if he does not feel that his pain is under control with the prednisone and the gabapentin, I would authorize a one-time telephone refill for hydrocodone/acetaminophen 5/325 mg 1 tab every 8 hours as needed, #12 with no refills.    4.  INTERVENTIONS: No interventions were ordered.  Patient may benefit from interventional pain management if he fails to improve with conservative treatment, depending on the results of advanced imaging studies.    5.  PATIENT EDUCATION:   -I provided a work note for the patient.  Patient works as a .  Indicated that he could return to light-duty work on February 22, 2019 with the following restrictions: No lifting/pushing/pulling more than 10 pounds, no bending or twisting at the waist, must be able to alternate sit to stand every 10-15 minutes, 4-hour shifts.  Hopefully we will be able to gradually loosen these restrictions as the patient makes progress with treatments.  -I did not discuss smoking cessation or weight loss with the patient today.  I will plan to discuss this with him at follow-up visits.  -The patient is in agreement the above plan.  All questions were answered.    6.  FOLLOW-UP:   I will see patient back in clinic in 2 weeks.  Nurse to call the patient with results of his MRI if those are  available before his follow-up appointment.  If he has any questions or concerns in the meantime, he should not hesitate to call.      SUBJECTIVE:  Dalila Malone  Is a 44 y.o. male who presents today as a referral from the emergency department for new patient evaluation of low back pain which began as a result of a work injury on February 11, 2019.  Patient works as a .  He states that he pulled a chain to lift a dock plate.  He felt immediate pain in the middle of his lower back.  He kept working and after 30-40 minutes he felt extreme heat in the lower back.  He went to sit down and did paperwork.  Upon standing he experienced increased pain.  He could not stand up straight.  He called his manager who called the triage nurse and recommended applying ice.  That night he went home and his wife massage his lower back.  His wife noticed that the lower back looked swollen.  He started applying ice and alternated with heat.  The next day he could not get out of bed in the morning due to severe pain.  He went to the emergency department.  In the emergency department they obtained an x-ray of the thoracic spine which is unremarkable.  He was discharged home with prescriptions for ibuprofen and Flexeril.  Patient states that the ibuprofen has been no help.  He has not tolerated the Flexeril well due to drowsiness.  He states that since he was seen in the emergency department last week his pain has not improved.  He states he feels miserable.  He has not had any previous history of back back pain.    Patient complains of low back pain.  He states that he feels pain just to the left of midline in the lower lumbar region.  The pain then spreads across the right side of his back.  He denies any pain radiating into the buttocks but he feels numbness and tingling down both buttocks.  Numbness and tingling does not radiate further down the legs.  Patient's pain is aggravated with bending, standing up straight,  "twisting, transitioning from seated to standing and vice versa, moving in bed, and sitting for more than 10 minutes.  Pain is alleviated with applying heat and using a TENS unit.  His sleep is affected by his pain.  He states that he wakes up every 60-90 minutes because of the pain.  He denies any loss of bowel or bladder control.  He denies any recent fevers, chills, or sweats.    Patient also complains of tingling in the left parascapular region which radiates down the left triceps into the extensor forearm, about penitent[facility] between the elbow and the wrist which began the same day as his back injury.  He denies any neck pain.  He denies any pain down the arm.  He denies any weakness in the arm.  He states that the numbness and tingling comes and goes.  It typically lasts for about 10-15 minutes.  There are no triggers for the numbness and tingling to begin.  He denies any alleviating factors.  He just goes away on its own.    Patient has been very limited functionally due to his pain.  He has not been to work since February 11, 2019.  He has not been able to dress himself at home.  He cannot put on his shoes.  He had to buy \"tear-away\" pants to make dressing and undressing easier for him and his wife who is helping him.  He has difficulty getting out of bed to go to the bathroom independently.    Patient has not had any treatments for his back.  He has not had chiropractic treatment.  He has not had physical therapy.  He does not have any history of spine injections or spine surgery.  He has been using ibuprofen 800 mg which has not been helpful at all.  He has taken Flexeril but this makes her very drowsy so he only takes it at bedtime.  He has been applying over-the-counter lidocaine patches which helped somewhat.  He has also been applying a TENS unit.    Current Outpatient Medications on File Prior to Encounter   Medication Sig Dispense Refill     cyclobenzaprine (FLEXERIL) 10 MG tablet Take 1 tablet (10 mg " total) by mouth 3 (three) times a day as needed for muscle spasms. 30 tablet 1     ibuprofen (ADVIL,MOTRIN) 600 MG tablet Take 1 tablet (600 mg total) by mouth every 6 (six) hours as needed for pain. 20 tablet 0     lidocaine (LIDODERM) 5 % Remove & Discard patch within 12 hours or as directed by MD 5 patch 0       No Known Allergies    Past medical history: Obesity    Past Surgical History:   Procedure Laterality Date     NO PAST SURGERIES         Family History   Problem Relation Age of Onset     Diabetes Mother      Stroke Mother      Hypertension Mother      Stroke Father      Hypertension Father      Diabetes Father        Social history: Patient is .  He has 3 children age 10, 6, and 6 months.  He works as a  at [employer].  He smokes 1/2 pack of cigarettes per day.  He drinks alcohol occasionally.  He denies illicit drug use.    ROS: Positive for headache, chest pain, shortness of breath, cough, wheezing, reflux, joint pain, muscle pain.  Specifically negative for bowel/bladder dysfunction, fevers,chills, appetite changes, unexplained weight loss.   Otherwise 13 systems reviewed are negative.  Please see the patient's intake questionnaire from today for details.      OBJECTIVE:  PHYSICAL EXAMINATION:    CONSTITUTIONAL:  Vital signs as above.  Patient appears to be in severe pain.  He alternates between sitting and standing every few minutes.  He grimaces and moves slowly about the room.  The patient is well nourished and well groomed.  PSYCHIATRIC:  The patient is awake, alert, oriented to person, place, time and answering questions appropriately with clear speech.    HEENT: Normocephalic, atraumatic.  Sclera clear.  Neck is supple.  SKIN:  Skin over the face, bilateral lower extremities, and posterior torso is clean, dry, intact without rashes.    GAIT:  Gait is guarded.  He ambulates with a flexed forward posture at the hips.  The patient is able to heel and toe walk  without significant difficulty.    STANDING EXAMINATION: Significant tenderness palpation over the right greater than left lumbar paraspinous muscles.  Significant hypertonicity in the lumbar paraspinous musculature.  No significant tenderness palpation about the left scapula.  No cervical spine tenderness.  Lumbar range of motion is severely restricted with flexion.  Lumbar range of motion is severely restricted with extension.  Patient unable to reach neutral posture due to severe pain in the lower back.  MUSCLE STRENGTH:  The patient has 4/5 strength right hip flexors, right knee flexors, right knee extensors.  Otherwise, the patient has 5/5 strength for the bilateral shoulder abductors, elbow flexors/extensors, wrist extensors, finger flexors/abductors, left hip flexors, left knee flexors/extensors, bilateral ankle dorsiflexors/plantar flexors, great toe extensors, ankle evertors/invertors.  NEUROLOGICAL: Reflexes are 1-2+ bilateral biceps, triceps, brachioradialis.  2+ patellar, and 1+ Achilles reflexes bilaterally.  Negative Babinski's bilaterally.  No ankle clonus bilaterally. Sensation to light touch is intact in the bilateral upper extremities throughout and in the bilateral L4, L5, and S1 dermatomes.  VASCULAR:  2/4 dorsalis pedis pulses bilaterally.  Bilateral lower extremities are warm.  There is no pitting edema of the bilateral lower extremities.  ABDOMINAL:  Soft, non-distended, non-tender throughout all quadrants.  No pulsatile mass palpated in the left lower quadrant.  LYMPH NODES:  No palpable or tender inguinal lymph nodes.  MUSCULOSKELETAL: Straight leg raise reproduces severe low back pain bilaterally at about 30 degrees.    RESULTS: [facility] RAD[facility]     EXAM: XR THORACIC SPINE 3 VWS  LOCATION: [facility]  DATE/TIME: 2/12/2019 1:17 PM     INDICATION: Pain  COMPARISON: None.     FINDINGS: The vertebral bodies of the thoracic spine have normal stature and alignment without evidence of  compression fracture. The disc spaces are well-maintained. No significant degenerative changes. Soft tissues unremarkable.

## 2021-06-24 NOTE — PROGRESS NOTES
Assessment:     Diagnoses and all orders for this visit:    Acute bilateral low back pain with bilateral sciatica  -     predniSONE (DELTASONE) 10 mg tablet pack  Dispense: 18 tablet; Refill: 0    Lumbar pain  -     predniSONE (DELTASONE) 10 mg tablet pack  Dispense: 18 tablet; Refill: 0    Paresthesia    Myofascial pain on right side       Dalila Malone is a 44 y.o. y.o. male patient of GEOVANI Tolliver with past medical history significant for obesity, nicotine use who presents today for follow-up regarding:    -Acute significant right greater than left low back pain with paresthesias into the upper buttock post work injury 2/11/2019.  Symptoms were initially improving with Medrol Dosepak and therapy however flareup 3/7/2019 post PT.    No radicular pain noted, patient is neurologically intact on exam.     Plan:     A shared decision making plan was used. The patient's values and choices were respected. Prior medical records from 3/5/2019 to current were reviewed today. The following represents what was discussed and decided upon by the provider and the patient.        -DIAGNOSTIC TESTS: Images were personally reviewed and interpreted.   --Lumbar spine [facility] shows L4-5 mild disc bulge with mild bilateral foraminal stenosis.    -INTERVENTIONS: No recommendations for injections at this time.    -MEDICATIONS: Did order Medrol Dosepak to see if we get further relief of his acute flareup lumbar spine pain.  -Advised patient continue gabapentin 300 mg 2 tablets 3 times daily, discussed that patient should stop ibuprofen for the first 6 days of Medrol Dosepak however then he can resume 600 mg 3 times daily.  In the meantime he can take Tylenol 500 mg 1-2 tablets 3 times daily for pain.  Patient not interested in stronger pain medications otherwise today.  Discussed side effects of medications and proper use. Patient verbalized understanding.    *Workability: Work note given for patient to be out of work until  3/13/2019, return to work with restrictions 3/14/2019 through 3/25/2019 and then will need to revisit workability with Lydia.    -PHYSICAL THERAPY: Advised patient continue physical therapy as tolerated at this time.  Did also advised patient to ice 20 minutes on and 1 hour off multiple times during the day for inflammation and pain.  Discussed the importance of core strengthening, ROM, stretching exercises with the patient and how each of these entities is important in decreasing pain.  Explained to the patient that the purpose of physical therapy is to teach the patient a home exercise program.  These exercises need to be performed every day in order to decrease pain and prevent future occurrences of pain.        -PATIENT EDUCATION:  25 minutes of total visit time was spent face to face with the patient today, 60 % of the visit was spent on counseling, education, and coordinating care.   -5 minutes spent outside of visit time, non-face-to-face time, reviewing chart.    -FOLLOW UP: Follow-up with Lydia if symptoms are not improving.  Advised to contact clinic if symptoms worsen or change.    Subjective:     Dalila Malone is a 44 y.o. male who presents today for follow-up regarding recent flareup 3/7/2019 after physical therapy of acute low back pain that radiates to the anterior and posterior thighs that stops at the knees, initial pain started post work injury 2/11/2019 and patient was doing much better however after Medrol Dosepak and gabapentin.  Patient denies any current numbness or tingling sensations but does feel weak in his low back given the amount of pain he is in, he reports that he has a difficult time standing straight up and down and forward bending is more comfortable for him.  Patient denies any recent trips or falls, denies bowel or bladder dysfunction.    *Work injury 2/11/2019, see prior notes from GEOVANI Tolliver.    Treatment to Date: No prior spinal surgery or spinal injection.  Physical therapy  [facility]    Medications:  Medrol Dosepak with significant relief  Gabapentin 300 mg 2-2-2  Flexeril 5 mg  Ibuprofen 600 mg  Lidocaine 5% patch    Patient Active Problem List   Diagnosis     Acute midline thoracic back pain       Current Outpatient Medications on File Prior to Encounter   Medication Sig Dispense Refill     cyclobenzaprine (FLEXERIL) 5 MG tablet Take 1-2 tablets (5-10 mg total) by mouth at bedtime as needed for muscle spasms. 30 tablet 0     gabapentin (NEURONTIN) 300 MG capsule Take 1 capsule (300 mg total) by mouth daily. Increase by 1 tab every 3 days.  Max dose 900 mg tid 180 capsule 2     ibuprofen (ADVIL,MOTRIN) 600 MG tablet Take 1 tablet (600 mg total) by mouth every 6 (six) hours as needed for pain. 20 tablet 0     lidocaine (LIDODERM) 5 % Remove & Discard patch within 12 hours or as directed by MD 5 patch 0     No current facility-administered medications on file prior to encounter.        No Known Allergies    Past Medical History:   Diagnosis Date     Medical history non-contributory         Review of Systems  ROS:  Specifically negative for bowel/bladder dysfunction, balance changes, headache, dizziness, foot drop, fevers, chills, appetite changes, nausea/vomiting, unexplained weight loss. Otherwise 13 systems reviewed are negative. Please see the patient's intake questionnaire from today for details.    Reviewed Social, Family, Past Medical and Past Surgical history with patient, no significant changes noted since prior visit.     Objective:     /84 (Patient Site: Right Arm, Patient Position: Sitting)   Pulse 84   Wt (!) 229 lb (103.9 kg)   SpO2 96%   BMI 35.52 kg/m      PHYSICAL EXAMINATION:    --CONSTITUTIONAL: Well developed, well nourished, healthy appearing individual.  --PSYCHIATRIC: Appropriate mood and affect. No difficulty interacting due to temper, social withdrawal, or memory issues.  --SKIN: Lumbar region is dry and intact.   --RESPIRATORY: Normal rhythm and effort.  No abnormal accessory muscle breathing patterns noted.   --MUSCULOSKELETAL:  Normal lumbar lordosis noted, no lateral shift.  --GROSS MOTOR: Easily arises from a seated position. Gait is non-antalgic  --LUMBAR SPINE:  Inspection reveals no evidence of deformity. Range of motion is minimally limited in lumbar flexion, significant limitations in neutral spine position, extension and lateral rotation.  Very generalized tenderness to palpation entire lumbar spine worse at the belt line. Straight leg raising in the seated position is positive bilaterally to radicular pain. Sciatic notch non-tender.   --SACROILIAC JOINT: One Finger point test negative.  --LOWER EXTREMITY MOTOR TESTING:  Plantar flexion left 5/5, right 5/5   Dorsiflexion left 5/5, right 5/5   Great toe MTP extension left 5/5, right 5/5  Knee flexion left 5/5, right 5/5  Knee extension left 5/5, right 5/5   Hip flexion left 5/5, right 5/5  Hip abduction left 5/5, right 5/5  Hip adduction left 5/5, right 5/5   --NEUROLOGIC: Bilateral patellar and achilles reflexes are physiologic and symmetric. Sensation to light touch is intact in the bilateral L4, L5, and S1 dermatomes.    RESULTS:   Imaging: Lumbar spine imaging was reviewed today. The images were shown to the patient and the findings were explained using a spine model.      Xr Thoracic Spine 3 Vws  Result Date: 2/12/2019  INDICATION: Pain COMPARISON: None.   FINDINGS: The vertebral bodies of the thoracic spine have normal stature and alignment without evidence of compression fracture. The disc spaces are well-maintained. No significant degenerative changes. Soft tissues unremarkable.

## 2021-06-25 ENCOUNTER — COMMUNICATION - HEALTHEAST (OUTPATIENT)
Dept: FAMILY MEDICINE | Facility: CLINIC | Age: 46
End: 2021-06-25

## 2021-06-25 NOTE — ED TRIAGE NOTES
Pt started to have LLQ pain of abdomen yesterday.  Pt has 10/10 pain  And had 1 episode of black stool today.

## 2021-06-26 NOTE — ED PROVIDER NOTES
EMERGENCY DEPARTMENT ENCOUNTER      NAME: Dalila Malone  AGE: 46 y.o. male  YOB: 1975  MRN: [id number]  EVALUATION DATE & TIME: 2021 12:57 PM    PCP: Elina Banks MD    ED PROVIDER: Shadia Xie M.D.    Chief Complaint   Patient presents with     Abdominal Pain     Melena       FINAL IMPRESSION:  1. 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 old male who presents to the ED for evaluation of left lower quadrant abdominal pain.   History and physical examination as documented. Vital signs reassuring.     Patient has had about 2 days of left lower quadrant abdominal pain.  He is notably tender in this region.  Today he had a black stool which concerned him.  He is only had the one episode.  No evidence of hemodynamic instability.  He has been nauseous with no vomiting.  No history of abdominal surgeries.  We will get CT imaging to evaluate for diverticulitis.  Also concern for colitis.  Bowel obstruction is possible.  Has no urinary symptoms to suggest urinary tract infection. Would be an atypical presentation for appendicitis.  Gastroenteritis is also possible.  From the perspective of possible upper GI bleed he has no symptoms of upper abdominal pain and is nontender in this region.  With a normal hemoglobin if CT is reassuring, I think he is appropriate for discharge home with follow-up with his primary care physician.     He was given Dilaudid and Zofran for symptom control.  Also given some IV fluids.  He was signed out to my colleague Dr. Leo Morales awaiting CT imaging results.    Scribe time stamps  1:05 PM I met with the patient to gather history and perform an initial exam.    0 minutes of critical care time     MEDICATIONS GIVEN IN THE EMERGENCY:  Medications   sodium chloride flush 10 mL (NS) (has no administration in time range)   ondansetron injection 4 mg (ZOFRAN) (4 mg Intravenous Given 21 1420)   HYDROmorphone injection 0.5 mg  (DILAUDID) (0.5 mg Intravenous Given 6/24/21 1427)   sodium chloride 0.9% 1,000 mL (1,000 mL Intravenous New Bag 6/24/21 1430)       NEW PRESCRIPTIONS STARTED AT TODAY'S ER VISIT  Current Discharge Medication List      CONTINUE these medications which have NOT CHANGED    Details   acetaminophen (TYLENOL) 325 MG tablet Take 975 mg by mouth every 6 (six) hours as needed for pain.       albuterol (PROAIR HFA;PROVENTIL HFA;VENTOLIN HFA) 90 mcg/actuation inhaler Inhale 2 puffs every 6 (six) hours as needed for wheezing.  Qty: 18 g, Refills: 11    Comments: May substitute Proair HFA, Ventolin HFA, albuterol HFA, Xopenex HFA  Associated Diagnoses: Acute bronchitis, unspecified organism      clotrimazole (LOTRIMIN) 1 % cream Apply to affected area of feet two times a day as needed  Qty: 60 g, Refills: 2    Associated Diagnoses: Tinea pedis, unspecified laterality      cyclobenzaprine (FLEXERIL) 5 MG tablet Take 1-2 tablets (5-10 mg total) by mouth 3 (three) times a day as needed for muscle spasms.  Qty: 30 tablet, Refills: 1    Associated Diagnoses: Lumbar pain      fluticasone propionate (FLONASE) 50 mcg/actuation nasal spray SHAKE LIQUID AND USE 2 SPRAYS IN EACH NOSTRIL DAILY  Qty: 48 g, Refills: 2    Associated Diagnoses: Chronic maxillary sinusitis      lisinopriL (PRINIVIL,ZESTRIL) 10 MG tablet Take 1 tablet (10 mg total) by mouth daily.  Qty: 90 tablet, Refills: 3    Associated Diagnoses: Benign essential hypertension      losartan (COZAAR) 25 MG tablet TAKE 1 TABLET(25 MG) BY MOUTH DAILY  Qty: 90 tablet, Refills: 3    Associated Diagnoses: Benign essential hypertension      methyl salicylate/menth/camph (SALONPAS TOP) Apply topically. salonpas patch OTC      montelukast (SINGULAIR) 10 mg tablet TAKE 1 TABLET(10 MG) BY MOUTH AT BEDTIME  Qty: 90 tablet, Refills: 3    Associated Diagnoses: Chronic maxillary sinusitis      ranitidine (ZANTAC) 150 MG tablet Take 150 mg by mouth.               =================================================================    HPI    Patient information was obtained from: Patient    Use of : N/A     Dalila Malone is a 46 y.o. male with a pertinent history of hypertension, hypertriglyceridemia, and obesity who presents to this ED via EMS for evaluation of abdominal pain.    Patient reports 10/10 sharp left lower abdominal pain with associated nausea and intermittent cold sweats for the past 2 days. He states the pain worsens when he is laying down flat or standing up straight. Patient has also had one episode of black stool today. He has not previously had similar symptoms. Denies history of abdominal surgeries. No blood thinner use. Patient also denies any fevers, vomiting, diarrhea, constipation, urinary symptoms, headache, cough, congestion, sore throat, chest pain, shortness of breath, or other symptoms or concerns at this time.     Social History: Patient is a smoker. He occasionally drinks alcohol. Denies illegal drug use.     REVIEW OF SYSTEMS   Review of Systems   Constitutional: Positive for chills and diaphoresis. Negative for fever.   HENT: Negative for congestion and sore throat.    Respiratory: Negative for cough and shortness of breath.    Cardiovascular: Negative for chest pain.   Gastrointestinal: Positive for abdominal pain (LLQ), blood in stool (black stool x1) and nausea. Negative for diarrhea and vomiting.   Genitourinary: Negative for difficulty urinating, dysuria, frequency and hematuria.   Neurological: Negative for headaches.   All other systems reviewed and are negative.      PAST MEDICAL HISTORY:  Past Medical History:   Diagnosis Date     Medical history non-contributory        PAST SURGICAL HISTORY:  Past Surgical History:   Procedure Laterality Date     NO PAST SURGERIES         CURRENT MEDICATIONS:    No current facility-administered medications on file prior to encounter.      Current Outpatient Medications on File Prior to  Encounter   Medication Sig     acetaminophen (TYLENOL) 325 MG tablet Take 975 mg by mouth every 6 (six) hours as needed for pain.      albuterol (PROAIR HFA;PROVENTIL HFA;VENTOLIN HFA) 90 mcg/actuation inhaler Inhale 2 puffs every 6 (six) hours as needed for wheezing.     clotrimazole (LOTRIMIN) 1 % cream Apply to affected area of feet two times a day as needed     cyclobenzaprine (FLEXERIL) 5 MG tablet Take 1-2 tablets (5-10 mg total) by mouth 3 (three) times a day as needed for muscle spasms.     fluticasone propionate (FLONASE) 50 mcg/actuation nasal spray SHAKE LIQUID AND USE 2 SPRAYS IN EACH NOSTRIL DAILY     lisinopriL (PRINIVIL,ZESTRIL) 10 MG tablet Take 1 tablet (10 mg total) by mouth daily.     losartan (COZAAR) 25 MG tablet TAKE 1 TABLET(25 MG) BY MOUTH DAILY     methyl salicylate/menth/camph (SALONPAS TOP) Apply topically. salonpas patch OTC     montelukast (SINGULAIR) 10 mg tablet TAKE 1 TABLET(10 MG) BY MOUTH AT BEDTIME     ranitidine (ZANTAC) 150 MG tablet Take 150 mg by mouth.       ALLERGIES:  No Known Allergies    FAMILY HISTORY:  Family History   Problem Relation Age of Onset     Diabetes Mother      Stroke Mother      Hypertension Mother      Stroke Father      Hypertension Father      Diabetes Father        SOCIAL HISTORY:   Social History     Socioeconomic History     Marital status:      Spouse name: None     Number of children: None     Years of education: None     Highest education level: None   Occupational History     None   Social Needs     Financial resource strain: None     Food insecurity     Worry: None     Inability: None     Transportation needs     Medical: None     Non-medical: None   Tobacco Use     Smoking status: Current Every Day Smoker     Types: Cigarettes     Smokeless tobacco: Never Used     Tobacco comment: smokes 6-8 per day   Substance and Sexual Activity     Alcohol use: Yes     Comment: occasional     Drug use: Never     Sexual activity: None   Lifestyle      Physical activity     Days per week: None     Minutes per session: None     Stress: None   Relationships     Social connections     Talks on phone: None     Gets together: None     Attends Hindu service: None     Active member of club or organization: None     Attends meetings of clubs or organizations: None     Relationship status: None     Intimate partner violence     Fear of current or ex partner: None     Emotionally abused: None     Physically abused: None     Forced sexual activity: None   Other Topics Concern     None   Social History Narrative     None       VITALS:  Patient Vitals for the past 24 hrs:   BP Temp Temp src Pulse Resp SpO2   06/24/21 1051 (!) 148/94 98.1  F (36.7  C) Temporal 74 21 96 %       PHYSICAL EXAM    General: Uncomfortable appearing.  HEENT: No external signs of head trauma. Oropharynx clear and moist.  Chest/Pulm: Lungs clear to auscultation bilaterally. No respiratory distress. Breathing comfortably on room air.   CV: Normal rate and rhythm without murmurs. Warm and well perfused.   Abdomen: Soft and non-distended. Tenderness to the left lower quadrant with palpation. No peritoneal signs.   MSK/Extremities: Actively moving all four extremities. No pedal edema.  Skin: No visible rashes. Not diaphoretic.   Neuro: Alert and conversant. GCS 15.  Psych: Behavior normal.    LAB:  All pertinent labs reviewed and interpreted.  Results for orders placed or performed during the hospital encounter of 06/24/21   Basic Metabolic Panel   Result Value Ref Range    Sodium 138 136 - 145 mmol/L    Potassium 4.3 3.5 - 5.0 mmol/L    Chloride 107 98 - 107 mmol/L    CO2 23 22 - 31 mmol/L    Anion Gap, Calculation 8 5 - 18 mmol/L    Glucose 91 70 - 125 mg/dL    Calcium 8.9 8.5 - 10.5 mg/dL    BUN 12 8 - 22 mg/dL    Creatinine 0.79 0.70 - 1.30 mg/dL    GFR MDRD Af Amer >60 >60 mL/min/1.73m2    GFR MDRD Non Af Amer >60 >60 mL/min/1.73m2   HM2(CBC w/o Differential)   Result Value Ref Range    WBC 9.2 4.0  - 11.0 thou/uL    RBC 4.98 4.40 - 6.20 mill/uL    Hemoglobin 15.3 14.0 - 18.0 g/dL    Hematocrit 46.3 40.0 - 54.0 %    MCV 93 80 - 100 fL    MCH 30.7 27.0 - 34.0 pg    MCHC 33.0 32.0 - 36.0 g/dL    RDW 12.6 11.0 - 14.5 %    Platelets 219 140 - 440 thou/uL    MPV 10.7 8.5 - 12.5 fL       RADIOLOGY:  CT pending    I, Rosemarie Gustafson, am serving as a scribe to document services personally performed by Dr. Xie based on my observation and the provider's statements to me. I, Shadia Xie MD attest that Rosemarie Gustafson is acting in a scribe capacity, has observed my performance of the services and has documented them in accordance with my direction.    Shadia Xie M.D.  Emergency Medicine  [facility] EMERGENCY DEPARTMENT  [address]  [facility] [address]  Dept: [phone]  Loc: [phone]       Shadia Xie MD  06/24/21 1756

## 2021-06-26 NOTE — TELEPHONE ENCOUNTER
CMT contacted the patient and notified him that he should speak with our RN triage line. Patient transferred to triage line.

## 2021-06-26 NOTE — TELEPHONE ENCOUNTER
Phone call to [place]. Spoke to Dr. Banks. She states she just received a message from the patient who is now reporting that his stools are black  She recommends patient be seen in ED.     Phone call to patient.  Informed him that Dr. Banks would like him to be seen in ED.  Patient agrees to plan.  Patient states he will be going to [facility].    Charlotte Katz RN 06/24/21 9:54 AM  [facility] Nurse Advisor    Triage call. Patient calling.     Having pain in lower left abdomen for the past 48 hours.  Sharp pain when trying to stand up straight.  Had a BM last night.  Has tried some OTC anti-gas medication without relief. Rates pain as 2/10 if sitting, when standing gets a sharp pain (5-6/10).    Per protocol, go to ED now (or to office with PCP approval).      Message sent to provider pool: Patient having lower left abdominal pain for the past 48 hours.  Please advise patient where you would like seen. Patient can be reached at  [phone].     Charlotte Katz RN 06/24/21 8:48 AM  [facility] Nurse Advisor    Reason for Disposition    Constant abdominal pain lasting > 2 hours    Additional Information    Negative: Passed out (i.e., fainted, collapsed and was not responding)    Negative: Shock suspected (e.g., cold/pale/clammy skin, too weak to stand, low BP, rapid pulse)    Negative: Sounds like a life-threatening emergency to the triager    Negative: SEVERE abdominal pain (e.g., excruciating)    Negative: Vomiting red blood or black (coffee ground) material    Negative: Bloody, black, or tarry bowel movements (Exception: chronic-unchanged black-grey bowel movements and is taking iron pills or Pepto-bismol)    Negative: Unable to urinate (or only a few drops) and bladder feels very full    Negative: Pain in scrotum persists > 1 hour    Protocols used: ABDOMINAL PAIN - MALE-A-OH    COVID 19 Nurse Triage Plan/Patient Instructions    Please be aware that novel coronavirus (COVID-19) may be circulating  "in the community. If you develop symptoms such as fever, cough, or SOB or if you have concerns about the presence of another infection including coronavirus (COVID-19), please contact your health care provider or visit  [email or web address].    Disposition/Instructions    Additional COVID19 information to add for patients.   How can I protect others?  If you have symptoms (fever, cough, body aches or trouble breathing): Stay home and away from others (self-isolate) until:    At least 10 days have passed since your symptoms started, And     You ve had no fever--and no medicine that reduces fever--for 1 full day (24 hours), And      Your other symptoms have resolved (gotten better).     If you don t have symptoms, but a test showed that you have COVID-19 (you tested positive):    Stay home and away from others (self-isolate). Follow the tips under \"How do I self-isolate?\" below for 10 days (20 days if you have a weak immune system).    You don't need to be retested for COVID-19 before going back to school or work. As long as you're fever-free and feeling better, you can go back to school, work and other activities after waiting the 10 or 20 days.     How do I self-isolate?    Stay in your own room, even for meals. Use your own bathroom if you can.     Stay away from others in your home. No hugging, kissing or shaking hands. No visitors.    Don t go to work, school or anywhere else.     Clean  high touch  surfaces often (doorknobs, counters, handles, etc.). Use a household cleaning spray or wipes. You ll find a full list on the EPA website:  www.epa.gov/pesticide-registration/list-n-disinfectants-use-against-sars-cov-2.    Cover your mouth and nose with a mask, tissue or washcloth to avoid spreading germs.    Wash your hands and face often. Use soap and water.    Caregivers in these groups are at risk for severe illness due to COVID-19:  o People 65 years and older  o People who live in a nursing home or " long-term care facility  o People with chronic disease (lung, heart, cancer, diabetes, kidney, liver, immunologic)  o People who have a weakened immune system, including those who:  - Are in cancer treatment  - Take medicine that weakens the immune system, such as corticosteroids  - Had a bone marrow or organ transplant  - Have an immune deficiency  - Have poorly controlled HIV or AIDS  - Are obese (body mass index of 40 or higher)  - Smoke regularly    Caregivers should wear gloves while washing dishes, handling laundry and cleaning bedrooms and bathrooms.    Use caution when washing and drying laundry: Don t shake dirty laundry, and use the warmest water setting that you can.    For more tips, go to www.cdc.gov/coronavirus/2019-ncov/downloads/10Things.pdf.    How can I take care of myself?  1. Get lots of rest. Drink extra fluids (unless a doctor has told you not to).     2. Take Tylenol (acetaminophen) for fever or pain. If you have liver or kidney problems, ask your family doctor if it s okay to take Tylenol.     Adults can take either:     650 mg (two 325 mg pills) every 4 to 6 hours, or     1,000 mg (two 500 mg pills) every 8 hours as needed.     Note: Don t take more than 3,000 mg in one day.   Acetaminophen is found in many medicines (both prescribed and over-the-counter medicines). Read all labels to be sure you don t take too much.     For children, check the Tylenol bottle for the right dose. The dose is based on the child s age or weight.    3. If you have other health problems (like cancer, heart failure, an organ transplant or severe kidney disease): Call your specialty clinic if you don t feel better in the next 2 days.    4. Know when to call 911: Emergency warning signs include:    Trouble breathing or shortness of breath    Pain or pressure in the chest that doesn t go away    Feeling confused like you haven t felt before, or not being able to wake up    Bluish-colored lips or face    What are the  symptoms of COVID-19?     The most common symptoms are cough, fever and trouble breathing.     Less common symptoms include body aches, chills, diarrhea (loose, watery poops), fatigue (feeling very tired), headache, runny nose, sore throat and loss of smell.    COVID-19 can cause severe coughing (bronchitis) and lung infection (pneumonia).    How does it spread?     The virus may spread when a person coughs or sneezes into the air. The virus can travel about 6 feet this way, and it can live on surfaces.      Common  (household disinfectants) will kill the virus.    Who is at risk?  Anyone can catch COVID-19 if they re around someone who has the virus.    How can others protect themselves?     Stay away from people who have COVID-19 (or symptoms of COVID-19).    Wash hands often with soap and water. Or, use hand  with at least 60% alcohol.    Avoid touching the eyes, nose or mouth.     Wear a face mask when you go out in public, when sick or when caring for a sick person.    Where can I get more information?    [facility]: About COVID-19: [email or web address]    CDC: What to Do If You re Sick: www.cdc.gov/coronavirus/2019-ncov/about/steps-when-sick.html    CDC: Ending Home Isolation: www.cdc.gov/coronavirus/2019-ncov/hcp/disposition-in-home-patients.html     CDC: Caring for Someone: www.cdc.gov/coronavirus/2019-ncov/if-you-are-sick/care-for-someone.html     [facility]: Interim Guidance for Hospital Discharge to Home: www.health.[facility].[place].us/diseases/coronavirus/hcp/hospdischarge.pdf    [facility] clinical trials (COVID-19 research studies): clinicalaffairs.[facility]/umn-clinical-trials     Below are the COVID-19 hotlines at the [place] Department of Health ([facility]). Interpreters are available.   o For health questions: Call 032-630-1272 or 1-551.362.3965 (7 a.m. to 7 p.m.)  o For questions about schools and childcare: Call 897-248-0256 or 1-170.199.5893 (7 a.m. to 7  p.m.)              Thank you for taking steps to prevent the spread of this virus.  o Limit your contact with others.  o Wear a simple mask to cover your cough.  o Wash your hands well and often.    Resources    [facility]: About COVID-19: [email or web address]    CDC: What to Do If You're Sick: www.cdc.gov/coronavirus/2019-ncov/about/steps-when-sick.html    CDC: Ending Home Isolation: www.cdc.gov/coronavirus/2019-ncov/hcp/disposition-in-home-patients.html     CDC: Caring for Someone: www.cdc.gov/coronavirus/2019-ncov/if-you-are-sick/care-for-someone.html     [facility]: Interim Guidance for Hospital Discharge to Home: www.[facility].mn./diseases/coronavirus/hcp/hospdischarge.pdf    [facility] clinical trials (COVID-19 research studies): clinicalaffairs.[facility]-clinical-trials     Below are the COVID-19 hotlines at the [place] Department of [facility] ([facility]). Interpreters are available.   o For health questions: Call 253-118-8849 or 1-383.892.5672 (7 a.m. to 7 p.m.)  o For questions about schools and childcare: Call 859-844-2737 or 1-435.185.7807 (7 a.m. to 7 p.m.)

## 2021-06-29 NOTE — PROGRESS NOTES
Progress Notes by Amy Rodriguez LPN at 4/28/2020  9:00 AM     Author: Amy Rodriguez LPN Service: -- Author Type: Licensed Nurse    Filed: 4/28/2020  9:06 AM Encounter Date: 4/28/2020 Status: Attested    : Amy Rodriguez LPN (Licensed Nurse) Cosigner: Elina Banks MD at 4/28/2020  9:03 PM    Attestation signed by Elina Banks MD at 4/28/2020  9:03 PM    Elina Banks MD                  I met with Dalila Maolne at the request of Dr. Banks to recheck his blood pressure.  Blood pressure medications on the MAR were reviewed with patient.    Patient has taken all medications as per usual regimen: Yes  Patient reports tolerating them without any issues or concerns: Recently switched from Lisinopril which made him cough.  Did not take any BP meds last week for 4 days.  Only had 2 doses of new medication.    Vitals:    04/28/20 0853   BP: 140/84   Patient Site: Right Arm   Patient Position: Sitting   Cuff Size: Adult Regular       After 5 minutes, the patient's blood pressure was < 140/90, the previous encounter was reviewed, recorded blood pressure below 140/90.  Patient was discharged and the note will be sent to the provider for final review.    Patient reports that his back is much improved due to resting during stay at home (COVID 19 precautions).  He is requesting a note from Provider (via My Chart) releasing him to return to work.

## 2021-07-03 NOTE — ADDENDUM NOTE
Addendum Note by Margie Reyes CNP at 3/11/2019 10:13 AM     Author: Margie Reyes CNP Service: -- Author Type: Nurse Practitioner    Filed: 3/11/2019 12:01 PM Date of Service: 3/11/2019 10:13 AM Status: Signed    : Margie Reyes CNP (Nurse Practitioner)    Encounter addended by: Margie Reyes CNP on: 3/11/2019 12:01 PM      Actions taken: Sign clinical note

## 2021-07-03 NOTE — ADDENDUM NOTE
Addendum Note by Lydia Millan PA-C at 9/11/2019  8:50 AM     Author: Lydia Millan PA-C Service: -- Author Type: Physician Assistant    Filed: 9/11/2019  9:40 AM Date of Service: 9/11/2019  8:50 AM Status: Signed    : Lydia Millan PA-C (Physician Assistant)    Encounter addended by: Lydia Millan PA-C on: 9/11/2019  9:40 AM      Actions taken: Order list changed, Diagnosis association updated

## 2021-07-03 NOTE — ADDENDUM NOTE
Addendum Note by Elina Limon MD at 8/10/2020  3:51 PM     Author: Elina Limon MD Service: -- Author Type: Physician    Filed: 8/10/2020  3:51 PM Encounter Date: 8/8/2020 Status: Signed    : Elina Limon MD (Physician)    Addended by: ELINA LIMON on: 8/10/2020 03:51 PM        Modules accepted: Orders

## 2021-07-03 NOTE — ADDENDUM NOTE
Addendum Note by Lydia Summers PA-C at 1/8/2020 10:50 AM     Author: Lydia Summers PA-C Service: -- Author Type: Physician Assistant    Filed: 1/8/2020 10:50 AM Encounter Date: 1/8/2020 Status: Signed    : Lydia Summers PA-C (Physician Assistant)    Addended by: LYDIA SUMMERS on: 1/8/2020 10:50 AM        Modules accepted: Orders

## 2021-07-03 NOTE — ADDENDUM NOTE
Addendum Note by Lydia Millan PA-C at 7/29/2019  2:03 PM     Author: Lydia Millan PA-C Service: -- Author Type: Physician Assistant    Filed: 7/30/2019  7:46 AM Date of Service: 7/29/2019  2:03 PM Status: Signed    : Lydia Millan PA-C (Physician Assistant)    Encounter addended by: Lydia Millan PA-C on: 7/30/2019  7:46 AM      Actions taken: Sign clinical note

## 2021-08-15 ENCOUNTER — HEALTH MAINTENANCE LETTER (OUTPATIENT)
Age: 46
End: 2021-08-15

## 2021-09-20 ASSESSMENT — PATIENT HEALTH QUESTIONNAIRE - PHQ9
10. IF YOU CHECKED OFF ANY PROBLEMS, HOW DIFFICULT HAVE THESE PROBLEMS MADE IT FOR YOU TO DO YOUR WORK, TAKE CARE OF THINGS AT HOME, OR GET ALONG WITH OTHER PEOPLE: SOMEWHAT DIFFICULT
SUM OF ALL RESPONSES TO PHQ QUESTIONS 1-9: 9
SUM OF ALL RESPONSES TO PHQ QUESTIONS 1-9: 9

## 2021-09-21 ASSESSMENT — PATIENT HEALTH QUESTIONNAIRE - PHQ9: SUM OF ALL RESPONSES TO PHQ QUESTIONS 1-9: 9

## 2021-09-23 ENCOUNTER — OFFICE VISIT (OUTPATIENT)
Dept: FAMILY MEDICINE | Facility: CLINIC | Age: 46
End: 2021-09-23
Payer: COMMERCIAL

## 2021-09-23 VITALS
HEART RATE: 75 BPM | HEIGHT: 67 IN | BODY MASS INDEX: 37.04 KG/M2 | RESPIRATION RATE: 16 BRPM | OXYGEN SATURATION: 95 % | TEMPERATURE: 98.4 F | SYSTOLIC BLOOD PRESSURE: 138 MMHG | DIASTOLIC BLOOD PRESSURE: 78 MMHG | WEIGHT: 236 LBS

## 2021-09-23 DIAGNOSIS — R06.83 SNORING: ICD-10-CM

## 2021-09-23 DIAGNOSIS — I10 BENIGN ESSENTIAL HYPERTENSION: ICD-10-CM

## 2021-09-23 DIAGNOSIS — G47.19 EXCESSIVE DAYTIME SLEEPINESS: ICD-10-CM

## 2021-09-23 DIAGNOSIS — R09.A2 GLOBUS SENSATION: ICD-10-CM

## 2021-09-23 DIAGNOSIS — Z00.00 HEALTH MAINTENANCE EXAMINATION: Primary | ICD-10-CM

## 2021-09-23 DIAGNOSIS — M79.641 BILATERAL HAND PAIN: ICD-10-CM

## 2021-09-23 DIAGNOSIS — R09.81 NASAL CONGESTION: ICD-10-CM

## 2021-09-23 DIAGNOSIS — Z86.59 HISTORY OF DEPRESSIVE SYMPTOMS: ICD-10-CM

## 2021-09-23 DIAGNOSIS — Z80.0 FAMILY HISTORY OF COLON CANCER: ICD-10-CM

## 2021-09-23 DIAGNOSIS — Z12.11 COLON CANCER SCREENING: ICD-10-CM

## 2021-09-23 DIAGNOSIS — J34.89 NASAL DRYNESS: ICD-10-CM

## 2021-09-23 DIAGNOSIS — M25.562 CHRONIC PAIN OF LEFT KNEE: ICD-10-CM

## 2021-09-23 DIAGNOSIS — E55.9 VITAMIN D DEFICIENCY: ICD-10-CM

## 2021-09-23 DIAGNOSIS — Z87.891 SMOKING HISTORY: ICD-10-CM

## 2021-09-23 DIAGNOSIS — M79.642 BILATERAL HAND PAIN: ICD-10-CM

## 2021-09-23 DIAGNOSIS — G89.29 CHRONIC PAIN OF LEFT KNEE: ICD-10-CM

## 2021-09-23 DIAGNOSIS — R06.81 APNEA: ICD-10-CM

## 2021-09-23 LAB
ALBUMIN SERPL-MCNC: 4.2 G/DL (ref 3.5–5)
ALP SERPL-CCNC: 92 U/L (ref 45–120)
ALT SERPL W P-5'-P-CCNC: 61 U/L (ref 0–45)
ANION GAP SERPL CALCULATED.3IONS-SCNC: 14 MMOL/L (ref 5–18)
AST SERPL W P-5'-P-CCNC: 28 U/L (ref 0–40)
BILIRUB SERPL-MCNC: 0.8 MG/DL (ref 0–1)
BUN SERPL-MCNC: 11 MG/DL (ref 8–22)
CALCIUM SERPL-MCNC: 9.4 MG/DL (ref 8.5–10.5)
CHLORIDE BLD-SCNC: 107 MMOL/L (ref 98–107)
CHOLEST SERPL-MCNC: 252 MG/DL
CO2 SERPL-SCNC: 21 MMOL/L (ref 22–31)
CREAT SERPL-MCNC: 0.94 MG/DL (ref 0.7–1.3)
FASTING STATUS PATIENT QL REPORTED: YES
GFR SERPL CREATININE-BSD FRML MDRD: >90 ML/MIN/1.73M2
GLUCOSE BLD-MCNC: 141 MG/DL (ref 70–125)
HBA1C MFR BLD: 6.3 % (ref 0–5.6)
HDLC SERPL-MCNC: 38 MG/DL
LDLC SERPL CALC-MCNC: 152 MG/DL
POTASSIUM BLD-SCNC: 3.7 MMOL/L (ref 3.5–5)
PROT SERPL-MCNC: 7.6 G/DL (ref 6–8)
SODIUM SERPL-SCNC: 142 MMOL/L (ref 136–145)
TRIGL SERPL-MCNC: 308 MG/DL

## 2021-09-23 PROCEDURE — 99213 OFFICE O/P EST LOW 20 MIN: CPT | Mod: 25 | Performed by: FAMILY MEDICINE

## 2021-09-23 PROCEDURE — 82306 VITAMIN D 25 HYDROXY: CPT | Performed by: FAMILY MEDICINE

## 2021-09-23 PROCEDURE — 80053 COMPREHEN METABOLIC PANEL: CPT | Performed by: FAMILY MEDICINE

## 2021-09-23 PROCEDURE — 83036 HEMOGLOBIN GLYCOSYLATED A1C: CPT | Performed by: FAMILY MEDICINE

## 2021-09-23 PROCEDURE — 80061 LIPID PANEL: CPT | Performed by: FAMILY MEDICINE

## 2021-09-23 PROCEDURE — 36415 COLL VENOUS BLD VENIPUNCTURE: CPT | Performed by: FAMILY MEDICINE

## 2021-09-23 PROCEDURE — 99396 PREV VISIT EST AGE 40-64: CPT | Performed by: FAMILY MEDICINE

## 2021-09-23 RX ORDER — MUPIROCIN 20 MG/G
OINTMENT TOPICAL 3 TIMES DAILY
Qty: 22 G | Refills: 0 | Status: SHIPPED | OUTPATIENT
Start: 2021-09-23 | End: 2021-09-28

## 2021-09-23 RX ORDER — MONTELUKAST SODIUM 10 MG/1
TABLET ORAL
Qty: 30 TABLET | Refills: 11 | Status: SHIPPED | OUTPATIENT
Start: 2021-09-23

## 2021-09-23 RX ORDER — ALBUTEROL SULFATE 90 UG/1
2 AEROSOL, METERED RESPIRATORY (INHALATION) EVERY 4 HOURS PRN
Qty: 18 G | Refills: 1 | Status: SHIPPED | OUTPATIENT
Start: 2021-09-23 | End: 2024-09-09

## 2021-09-23 RX ORDER — LOSARTAN POTASSIUM 25 MG/1
25 TABLET ORAL
COMMUNITY
Start: 2021-04-12 | End: 2021-09-23

## 2021-09-23 RX ORDER — LOSARTAN POTASSIUM 25 MG/1
25 TABLET ORAL DAILY
Qty: 90 TABLET | Refills: 3 | Status: SHIPPED | OUTPATIENT
Start: 2021-09-23 | End: 2022-08-08

## 2021-09-23 RX ORDER — MONTELUKAST SODIUM 10 MG/1
TABLET ORAL
COMMUNITY
Start: 2021-04-12 | End: 2021-09-23

## 2021-09-23 ASSESSMENT — MIFFLIN-ST. JEOR: SCORE: 1911.18

## 2021-09-23 NOTE — PROGRESS NOTES
SUBJECTIVE:   CC: Dalila Malone is an 46 year old male who presents for preventative health visit.     Globus sensation: Patient has noticed this for the last month or 2.  Feels like he has to clear his throat frequently.  Present ever since he quit smoking 2 months ago.  When he was a smoker, he had a chronic cough, which has since resolved.  He never had these throat symptoms previously.    Nasal symptoms: Reports dry nasal passages.  Reports it even feels chapped or cracked and he has been using Vaseline inside his nares for this, but not really helping.  Uses Flonase daily due to nasal congestion that really just bothered him while smoking and since smoking the nasal congestion has resolved.    Left knee pain: If he is walking fast or jogging and fully straightens the knee it can lock up and be painful.  Prior ACL injury by his report, no history of surgery.    Bilateral finger pain, locking: Third and fourth fingers on each hand can be painful to the flex, stiff in the mornings, and sometimes locks in the flexed position.  Works as a  and needs good mobility of his hands and fingers.    Hypertension: Ran out of his losartan about a week ago.  Was tolerating it well.    Excessive daytime sleepiness and snoring: Reports snoring, witnessed apnea episodes, tired during the day.  Has not been evaluated for sleep apnea.    Mood symptoms: Noted on PHQ-9.  He does not feel he is depressed or anxious, just has a lot of stressors in his life right now.  Wife suffers from depression and he needs to take on a lot of the family responsibilities on his own for this reason.  No suicidal ideation.  Has never been on medication.  Does not feel he would have time for therapy.    Health maintenance: Due for tetanus booster and flu shot, but has this scheduled for October 8 when he is off work, in case he gets a sore arm.  He has 2 half brothers that had colon cancers diagnosed in their 60s.    Patient has been advised of  split billing requirements and indicates understanding: Yes  Healthy Habits:     Getting at least 3 servings of Calcium per day:  NO    Bi-annual eye exam:  NO    Dental care twice a year:  NO    Sleep apnea or symptoms of sleep apnea:  Daytime drowsiness and Excessive snoring    Diet:  Regular (no restrictions)    Frequency of exercise:  2-3 days/week    Duration of exercise:  Other    Taking medications regularly:  No    Medication side effects:  None    PHQ-2 Total Score: 4    Additional concerns today:  No              Today's PHQ-2 Score:   PHQ-2 ( 1999 [employer]) 9/20/2021   Q1: Little interest or pleasure in doing things 2   Q2: Feeling down, depressed or hopeless 2   PHQ-2 Score 4   Q1: Little interest or pleasure in doing things More than half the days   Q2: Feeling down, depressed or hopeless More than half the days   PHQ-2 Score 4       Abuse: Current or Past(Physical, Sexual or Emotional)- No  Do you feel safe in your environment? Yes    Have you ever done Advance Care Planning? (For example, a Health Directive, POLST, or a discussion with a medical provider or your loved ones about your wishes): No, advance care planning information given to patient to review.  Patient plans to discuss their wishes with loved ones or provider.      Social History     Tobacco Use     Smoking status: Former Smoker     Packs/day: 0.25     Types: Cigarettes     Smokeless tobacco: Never Used     Tobacco comment: smokes 6-8 per day   Substance Use Topics     Alcohol use: Yes     Comment: Alcoholic Drinks/day: occasional         Alcohol Use 9/20/2021   Prescreen: >3 drinks/day or >7 drinks/week? No       Last PSA: No results found for: PSA    Reviewed orders with patient. Reviewed health maintenance and updated orders accordingly - Yes      Reviewed and updated as needed this visit by clinical staff   Allergies  Meds              Reviewed and updated as needed this visit by Provider  Tobacco  Allergies  Meds  Problems  Med  "Hx  Surg Hx  Fam Hx             Review of Systems      OBJECTIVE:   Ht 1.705 m (5' 7.13\")   Wt 107 kg (236 lb)   BMI 36.82 kg/m      Physical Exam   GENERAL:  Pleasant, well-appearing man in no acute distress.  VITAL SIGNS:  Reviewed.  HEENT:  Pupils are equal, round, and reactive to light.  Sclerae and  conjunctivae clear.  TMs are clear bilaterally.  Oropharynx is clear with  moist mucous membranes.  NECK:  Supple without lymphadenopathy or thyromegaly.    CARDIOVASCULAR:  Heart regular rate and rhythm without murmur.  Normal S1 and  S2.  LUNGS:  Clear to auscultation bilaterally without wheezes or crackles.  Good  air movement throughout.    ABDOMEN:  Soft, nontender, and nondistended without  guarding or rebound.  No organomegaly.  EXTREMITIES:  Warm and well perfused without edema. Dorsalis pedis pulses easily palpable and symmetric bilaterally.  NEURO: Alert and oriented. Grossly nonfocal.  PSYCHIATRIC: Presents on time and well groomed.  Normal speech and thought content.  Full affect.  No abnormal movements or behaviors noted.   GENITAL/RECTAL: Normal genitalia.  No skin lesions.  -   MSK: DIP swelling/deformities c/w OA. No nodule palpated in palm, but one of fingers does trigger during appointment          ASSESSMENT/PLAN:   Heu was seen today for physical.    Diagnoses and all orders for this visit:    Benign essential hypertension  Ran out of losartan. Refill provided. Labs today.   -     losartan (COZAAR) 25 MG tablet; Take 1 tablet (25 mg) by mouth daily  -     Lipid panel; Future  -     Comprehensive metabolic panel (BMP + Alb, Alk Phos, ALT, AST, Total. Bili, TP); Future    Globus sensation  Ongoing 1-2 months since quitting smoking. Mild symptom, just feels like he has to clear throat frequently. Try stopping flonase - it may be cause excessive dryness and schedule w ENT if symptoms persist  -     Otolaryngology Referral; Future    Nasal congestion  Requests refill of Singulair, although " symptoms are much better since quitting smoking  -     montelukast (SINGULAIR) 10 MG tablet; TAKE 1 TABLET(10 MG) BY MOUTH AT BEDTIME    Snoring, apnea, daytime sleepiness  Discussed need for further evalution  -     SLEEP EVALUATION & MANAGEMENT REFERRAL - ADULT -; Future    Smoking history  Rare/no wheezing since quitting smoking but requests refill to have on hand  -     albuterol (PROVENTIL HFA) 108 (90 Base) MCG/ACT inhaler; Inhale 2 puffs into the lungs every 4 hours as needed for shortness of breath / dyspnea or wheezing    Bilateral hand pain  Sounds like most of symptoms related to trigger finger and OA, referral to hand specialist for treatment  -     Orthopedic  Referral; Future  -     Vitamin D Deficiency; Future  -     Vitamin D Deficiency    Colon cancer screening  Fam history in two half brothers by early 60s, recommend he have screening now  -     Adult Gastro Ref - Procedure Only; Future    Nasal dryness  Likely related to flonase, which he will stop. Vaseline not helping. Trial of mupirocin x 5 days in case there is bacterial component  -     mupirocin (BACTROBAN) 2 % external ointment; Apply topically 3 times daily for 5 days    Health maintenance examination  -     Hemoglobin A1c; Future    History of depressive symptoms  Mild symptoms, lots of stress. Interested in trying medication. Not interested in therapist. Rx sertraline and follow up in one month, sooner if problems or concerns  -     sertraline (ZOLOFT) 50 MG tablet; One half tablet daily for one week, then one full tablet daily    Chronic pain of left knee  -     Orthopedic  Referral; Future    Other orders  Plans to come 10/8 for shots when he has a day off work and the weekend to recover in case arm is sore.   -     TDAP VACCINE (Adacel, Boostrix)  [[id number]]; Future  -     INFLUENZA VACCINE IM >6 MO VALENT IIV4 (ALFURIA/FLUZONE); Future      Patient has been advised of split billing requirements and indicates  "understanding: Yes  COUNSELING:   Reviewed preventive health counseling, as reflected in patient instructions       Regular exercise       Healthy diet/nutrition       Consider Hep C screening for all patients one time for ages 18-79 years       HIV screeninx in teen years, 1x in adult years, and at intervals if high risk       Colon cancer screening       Prostate cancer screening    Estimated body mass index is 36.82 kg/m  as calculated from the following:    Height as of this encounter: 1.705 m (5' 7.13\").    Weight as of this encounter: 107 kg (236 lb).     Weight management plan: Discussed healthy diet and exercise guidelines    He reports that he has quit smoking. His smoking use included cigarettes. He smoked 0.25 packs per day. He has never used smokeless tobacco.      Counseling Resources:  ATP IV Guidelines  Pooled Cohorts Equation Calculator  FRAX Risk Assessment  ICSI Preventive Guidelines  Dietary Guidelines for Americans,   USDA's MyPlate  ASA Prophylaxis  Lung CA Screening    Sandy Henry MD  [facility]  Answers for HPI/ROS submitted by the patient on 2021  If you checked off any problems, how difficult have these problems made it for you to do your work, take care of things at home, or get along with other people?: Somewhat difficult  PHQ9 TOTAL SCORE: 9      "

## 2021-09-23 NOTE — PATIENT INSTRUCTIONS
Stop your flonase for the next 4 weeks. If nose and throat symptoms continue, scheduled with ENT (ear, nose, throat doctor)    Schedule a phone visit in one month to follow up on the new medication (sertraline)

## 2021-09-24 LAB — DEPRECATED CALCIDIOL+CALCIFEROL SERPL-MC: 18 UG/L (ref 30–80)

## 2021-09-26 RX ORDER — ERGOCALCIFEROL 1.25 MG/1
50000 CAPSULE, LIQUID FILLED ORAL WEEKLY
Qty: 12 CAPSULE | Refills: 0 | Status: SHIPPED | OUTPATIENT
Start: 2021-09-26 | End: 2021-12-13

## 2021-09-27 ENCOUNTER — ANCILLARY PROCEDURE (OUTPATIENT)
Dept: GENERAL RADIOLOGY | Facility: CLINIC | Age: 46
End: 2021-09-27
Attending: PEDIATRICS
Payer: COMMERCIAL

## 2021-09-27 ENCOUNTER — OFFICE VISIT (OUTPATIENT)
Dept: ORTHOPEDICS | Facility: CLINIC | Age: 46
End: 2021-09-27
Payer: COMMERCIAL

## 2021-09-27 VITALS
HEIGHT: 67 IN | WEIGHT: 236 LBS | SYSTOLIC BLOOD PRESSURE: 144 MMHG | DIASTOLIC BLOOD PRESSURE: 88 MMHG | BODY MASS INDEX: 37.04 KG/M2

## 2021-09-27 DIAGNOSIS — M17.11 PRIMARY OSTEOARTHRITIS OF RIGHT KNEE: Primary | ICD-10-CM

## 2021-09-27 DIAGNOSIS — G89.29 CHRONIC PAIN OF LEFT KNEE: ICD-10-CM

## 2021-09-27 DIAGNOSIS — M25.561 CHRONIC PAIN OF BOTH KNEES: ICD-10-CM

## 2021-09-27 DIAGNOSIS — G89.29 CHRONIC PAIN OF BOTH KNEES: ICD-10-CM

## 2021-09-27 DIAGNOSIS — M25.562 CHRONIC PAIN OF BOTH KNEES: ICD-10-CM

## 2021-09-27 DIAGNOSIS — M25.562 CHRONIC PAIN OF LEFT KNEE: ICD-10-CM

## 2021-09-27 PROCEDURE — 99213 OFFICE O/P EST LOW 20 MIN: CPT | Performed by: PEDIATRICS

## 2021-09-27 PROCEDURE — 73562 X-RAY EXAM OF KNEE 3: CPT | Mod: RT | Performed by: RADIOLOGY

## 2021-09-27 ASSESSMENT — MIFFLIN-ST. JEOR: SCORE: 1911.18

## 2021-09-27 NOTE — PROGRESS NOTES
ASSESSMENT & PLAN    Diagnoses and all orders for this visit:    Primary osteoarthritis of right knee  -     VIDAL PT and Hand Referral; Future    Chronic pain of both knees  -     Orthopedic  Referral  -     XR Knee Bilateral 3 Views; Future  -     VIDAL PT and Hand Referral; Future      On the right, underlying degenerative change.  There is some remote history of trauma, possibly ligamentous injury, but no specific treatment at the time.  On the left, consistent with more functional issue, patellofemoral pain.  Symptom management reviewed, icing, heat, over-the-counter medication.  Activity modification reviewed as well.  Discussed consideration of imaging with MRI, not required currently on the right given the underlying arthrosis, and not required on left given current exam findings.  Plan to start with physical therapy, referral placed.  May use compression or support for comfort with activities, if desired.  He is not particularly interested in injection currently, but we may consider this for the right knee in the future, pending course.  Questions answered. Discussed signs and symptoms that may indicate more serious issues; the patient was instructed to seek appropriate care if noted. Heu indicates understanding of these issues and agrees with the plan.          See Patient Instructions  Patient Instructions   Right knee x-rays demonstrate some degenerative change, or arthritis.  On the left, x-ray findings are reassuring.  So, on the left this is primarily patellofemoral pain, and I think there is also some patellofemoral pain component on the right, in addition to some pain from the underlying right knee arthritis.  Icing, heat, over-the-counter medication as needed for soreness.  We discussed consideration of additional imaging of the affected area with MRI, but this is not required currently given assessment and plan for other intervention.    Referred to physical therapy next.  May use compression  "sleeve for knee brace for comfort with activities, if desired.  We also discussed future consideration of an injection for the right knee.  Hold for now, with plan for physical therapy.  Monitor course over the next 4 to 6 weeks with PT, and contact clinic if any questions or concerns.    If you have any further questions for your physician or physician s care team you can call [phone] and use option 3 to leave a voice message. Calls received during business hours will be returned same day.        Dallas Hilliard DO  [facility]      CC: Sandy Henry      -----  No chief complaint on file.      SUBJECTIVE  Dalila Malone is a/an 46 year old male who is seen in consultation at the request of  Sandy Henry M.D. for evaluation of bilateral knee pain.    Thinks history of ACL and MCL injuries he believes to both knees, but no past specific treatment. States he has had his knee \"pop out\" when he previously played soccer and volleyball.   Knees lock when he is walking.   Pain in the anterior and posterior aspect of his knees. .     The patient is seen by themselves.  The patient is Right handed    Onset: 2 years . No known current injury   Location of Pain: bilateral knees   Worsened by: walking   Better with: rest   Treatments tried: no treatment tried to date  Associated symptoms: locking or catching and feeling of instability    Orthopedic/Surgical history: NO surgery, injuries that were not evaluated   Social History/Occupation:      No family history pertinent to patient's problem today.   **  Can get joint noise at times with walking.  Pain with flexion. Feels like getting a locking sensation, with fully extending.  No noted swelling.  Not necessarily pain associated with locking sensation.      REVIEW OF SYSTEMS:  Review of Systems   All other systems reviewed and are negative.        OBJECTIVE:  BP (!) 144/88   Ht 1.705 m (5' 7.13\")   Wt 107 kg " (236 lb)   BMI 36.82 kg/m     General: healthy, alert and in no distress  HEENT: no scleral icterus or conjunctival erythema  Skin: no suspicious lesions or rash. No jaundice.  CV: distal perfusion intact   Resp: normal respiratory effort without conversational dyspnea   Psych: normal mood and affect  Gait: normal steady gait with appropriate coordination and balance   Neuro: Normal light sensory exam of  extremity         Bilateral Knee exam    Inspection:   no effusion   no ecchymosis    ROM:      Full active and passive ROM with flexion and extension  Tension left full flexion, mild pain right full flexion  Pain with actively extending    Patellar Motion:      Normal patellar tracking noted through range of motion  Mild pain patellar translation bilat    Tender:      medial joint line bilat       infrapatellar tendon left    Special Tests:      Pain medially right with Chapo; neg left       neg (-) Lachman       neg (-) anterior drawer       neg (-) posterior drawer left, equivocal right       neg (-) varus       neg (-) valgus       + mild right pain with forced extension            RADIOLOGY:  I independently ordered, visualized and reviewed these images with the patient  Right knee degenerative change.  Did you send a note that she had a back already or      Recent Results (from the past 24 hour(s))   XR Knee Bilateral 3 Views    Narrative    Examination:  XR KNEE BILATERAL 3 VIEWS    Date:  9/27/2021 11:08 AM     Clinical Information: Chronic left knee pain.    Comparison: none.      Impression    Impression:    1.  Mild-moderate tricompartmental degenerative arthritis in the right  knee, with partial joint space narrowing and marginal osteophytes,  greatest in the medial compartment. Normal joint alignment. No  significant joint effusion. Findings are asymmetric relative to the  left knee, suggest traumatic injury or internal derangement. Cortical  irregularity and lucency on the lateral view is cortical  for prior PCL  avulsion or other ossified intra-articular body.    2.  Normal left knee joint alignment and spacing.    ZACHARY AVELAR MD         SYSTEM ID:  [id number]           Review of prior external note(s) from - referring  35 minutes spent on the date of the encounter doing chart review, history and exam, documentation and further activities per the note

## 2021-09-27 NOTE — LETTER
9/27/2021         RE: Dalila Malone  [address]  [place] [address]        Dear Colleague,    Thank you for referring your patient, Dalila Malnoe, to the [facility] [place]. Please see a copy of my visit note below.    ASSESSMENT & PLAN    Diagnoses and all orders for this visit:    Primary osteoarthritis of right knee  -     VIDAL PT and Hand Referral; Future    Chronic pain of both knees  -     Orthopedic  Referral  -     XR Knee Bilateral 3 Views; Future  -     VIDAL PT and Hand Referral; Future      On the right, underlying degenerative change.  There is some remote history of trauma, possibly ligamentous injury, but no specific treatment at the time.  On the left, consistent with more functional issue, patellofemoral pain.  Symptom management reviewed, icing, heat, over-the-counter medication.  Activity modification reviewed as well.  Discussed consideration of imaging with MRI, not required currently on the right given the underlying arthrosis, and not required on left given current exam findings.  Plan to start with physical therapy, referral placed.  May use compression or support for comfort with activities, if desired.  He is not particularly interested in injection currently, but we may consider this for the right knee in the future, pending course.  Questions answered. Discussed signs and symptoms that may indicate more serious issues; the patient was instructed to seek appropriate care if noted. Torreyu indicates understanding of these issues and agrees with the plan.          See Patient Instructions  Patient Instructions   Right knee x-rays demonstrate some degenerative change, or arthritis.  On the left, x-ray findings are reassuring.  So, on the left this is primarily patellofemoral pain, and I think there is also some patellofemoral pain component on the right, in addition to some pain from the underlying right knee arthritis.  Icing, heat, over-the-counter  "medication as needed for soreness.  We discussed consideration of additional imaging of the affected area with MRI, but this is not required currently given assessment and plan for other intervention.    Referred to physical therapy next.  May use compression sleeve for knee brace for comfort with activities, if desired.  We also discussed future consideration of an injection for the right knee.  Hold for now, with plan for physical therapy.  Monitor course over the next 4 to 6 weeks with PT, and contact clinic if any questions or concerns.    If you have any further questions for your physician or physician s care team you can call [phone] and use option 3 to leave a voice message. Calls received during business hours will be returned same day.        [facility]      CC: Sandy Henry      -----  No chief complaint on file.      SUBJECTIVE  Dalila Malone is a/an 46 year old male who is seen in consultation at the request of  Sandy Henry M.D. for evaluation of bilateral knee pain.    Thinks history of ACL and MCL injuries he believes to both knees, but no past specific treatment. States he has had his knee \"pop out\" when he previously played soccer and volleyball.   Knees lock when he is walking.   Pain in the anterior and posterior aspect of his knees. .     The patient is seen by themselves.  The patient is Right handed    Onset: 2 years . No known current injury   Location of Pain: bilateral knees   Worsened by: walking   Better with: rest   Treatments tried: no treatment tried to date  Associated symptoms: locking or catching and feeling of instability    Orthopedic/Surgical history: NO surgery, injuries that were not evaluated   Social History/Occupation:      No family history pertinent to patient's problem today.   **  Can get joint noise at times with walking.  Pain with flexion. Feels like getting a locking sensation, " "with fully extending.  No noted swelling.  Not necessarily pain associated with locking sensation.      REVIEW OF SYSTEMS:  Review of Systems   All other systems reviewed and are negative.        OBJECTIVE:  BP (!) 144/88   Ht 1.705 m (5' 7.13\")   Wt 107 kg (236 lb)   BMI 36.82 kg/m     General: healthy, alert and in no distress  HEENT: no scleral icterus or conjunctival erythema  Skin: no suspicious lesions or rash. No jaundice.  CV: distal perfusion intact   Resp: normal respiratory effort without conversational dyspnea   Psych: normal mood and affect  Gait: normal steady gait with appropriate coordination and balance   Neuro: Normal light sensory exam of  extremity         Bilateral Knee exam    Inspection:   no effusion   no ecchymosis    ROM:      Full active and passive ROM with flexion and extension  Tension left full flexion, mild pain right full flexion  Pain with actively extending    Patellar Motion:      Normal patellar tracking noted through range of motion  Mild pain patellar translation bilat    Tender:      medial joint line bilat       infrapatellar tendon left    Special Tests:      Pain medially right with Chapo; neg left       neg (-) Lachman       neg (-) anterior drawer       neg (-) posterior drawer left, equivocal right       neg (-) varus       neg (-) valgus       + mild right pain with forced extension            RADIOLOGY:  I independently ordered, visualized and reviewed these images with the patient  Right knee degenerative change.  Did you send a note that she had a back already or      Recent Results (from the past 24 hour(s))   XR Knee Bilateral 3 Views    Narrative    Examination:  XR KNEE BILATERAL 3 VIEWS    Date:  9/27/2021 11:08 AM     Clinical Information: Chronic left knee pain.    Comparison: none.      Impression    Impression:    1.  Mild-moderate tricompartmental degenerative arthritis in the right  knee, with partial joint space narrowing and marginal " osteophytes,  greatest in the medial compartment. Normal joint alignment. No  significant joint effusion. Findings are asymmetric relative to the  left knee, suggest traumatic injury or internal derangement. Cortical  irregularity and lucency on the lateral view is cortical for prior PCL  avulsion or other ossified intra-articular body.    2.  Normal left knee joint alignment and spacing.    ZACHARY AVELAR MD         SYSTEM ID:  [id number]           Review of prior external note(s) from - referring  35 minutes spent on the date of the encounter doing chart review, history and exam, documentation and further activities per the note             Again, thank you for allowing me to participate in the care of your patient.        Sincerely,        Dallas Hilliard, DO

## 2021-09-27 NOTE — PATIENT INSTRUCTIONS
Right knee x-rays demonstrate some degenerative change, or arthritis.  On the left, x-ray findings are reassuring.  So, on the left this is primarily patellofemoral pain, and I think there is also some patellofemoral pain component on the right, in addition to some pain from the underlying right knee arthritis.  Icing, heat, over-the-counter medication as needed for soreness.  We discussed consideration of additional imaging of the affected area with MRI, but this is not required currently given assessment and plan for other intervention.    Referred to physical therapy next.  May use compression sleeve for knee brace for comfort with activities, if desired.  We also discussed future consideration of an injection for the right knee.  Hold for now, with plan for physical therapy.  Monitor course over the next 4 to 6 weeks with PT, and contact clinic if any questions or concerns.    If you have any further questions for your physician or physician s care team you can call [phone] and use option 3 to leave a voice message. Calls received during business hours will be returned same day.

## 2021-10-06 ENCOUNTER — OFFICE VISIT (OUTPATIENT)
Dept: ORTHOPEDICS | Facility: CLINIC | Age: 46
End: 2021-10-06
Payer: COMMERCIAL

## 2021-10-06 VITALS
BODY MASS INDEX: 37.04 KG/M2 | WEIGHT: 236 LBS | DIASTOLIC BLOOD PRESSURE: 83 MMHG | HEIGHT: 67 IN | SYSTOLIC BLOOD PRESSURE: 130 MMHG

## 2021-10-06 DIAGNOSIS — M65.30 TRIGGER FINGER OF RIGHT HAND, UNSPECIFIED FINGER: Primary | ICD-10-CM

## 2021-10-06 DIAGNOSIS — M79.641 BILATERAL HAND PAIN: ICD-10-CM

## 2021-10-06 DIAGNOSIS — M79.642 BILATERAL HAND PAIN: ICD-10-CM

## 2021-10-06 PROCEDURE — 99213 OFFICE O/P EST LOW 20 MIN: CPT | Performed by: FAMILY MEDICINE

## 2021-10-06 ASSESSMENT — MIFFLIN-ST. JEOR: SCORE: 1911.18

## 2021-10-06 NOTE — LETTER
"    10/6/2021         RE: Dalila Malone  [address]  [place] [address]        Dear Colleague,    Thank you for referring your patient, Dalila Malone, to the [facility] [place]. Please see a copy of my visit note below.    Dalila Malone  :  1975  DOS: 10/6/2021  MRN: [id number]    Sports Medicine Clinic Visit    PCP: Elina Banks    Dalila Malone is a 46 year old Right hand dominant male who is seen in consultation at the request of  Sandy Henry M.D. presenting with right hand, finger pain.    Dalila presents today with increased pain and triggering of his third and fourth digits of his right hand. He is not had this problem before. The pain started within the last few months.   It bothers him when driving and with heavy  activity. These digits will often become stuck when he performs heavy  activity. He has tried over-the-counter pain medication without relief. Has not tried any other treatments outside of activity modification.    Social History: currently employed as     Review of Systems  Musculoskeletal: as above  Remainder of review of systems is negative including constitutional, CV, pulmonary, GI, Skin and Neurologic except as noted in HPI or medical history.    Past Medical History:   Diagnosis Date     Medical history non-contributory      Past Surgical History:   Procedure Laterality Date     NO PAST SURGERIES       Family History   Problem Relation Age of Onset     Diabetes Mother      Cerebrovascular Disease Mother      Hypertension Mother      Cerebrovascular Disease Father      Hypertension Father      Diabetes Father          Objective  /83   Ht 1.705 m (5' 7.13\")   Wt 107 kg (236 lb)   BMI 36.82 kg/m        General: healthy, alert and in no distress      HEENT: no scleral icterus or conjunctival erythema     Skin: no suspicious lesions or rash. No jaundice.     CV: regular rhythm by palpation, 2+ distal pulses, no pedal edema  "     Resp: normal respiratory effort without conversational dyspnea     Psych: normal mood and affect      Gait: nonantalgic, appropriate coordination and balance     Neuro: normal light touch sensory exam of the extremities. Motor strength as noted below     Right hand exam  Inspection: Middle and long Finger:  slight swelling, over MCP, flexor tendon nodule  Tender: Middle and Long Finger:   MCP joint, triggering, A1 Pulley  Non-tender: remainder of hand  Range of Motion middle and Long Finger:   Painful full extension, triggering of A1 pulley with flexion  Strength: full strength      Radiology:  No formal imaging today    Assessment:  1. Trigger finger of right hand, unspecified finger    2. Bilateral hand pain        Plan:  Discussed the assessment with the patient.  Follow up: As needed based on clinical progress  Triggering of the third and fourth digits of his right hand over the last few months  Reviewed activity modification, padded glove usage, avoiding heavy   Oval 8 splints provided to prevent triggering and encourage rest of the tendon, use strategies reviewed  Reviewed option for corticosteroid injection to the A1 pulley tendon sheaths if needed for labile or worsening symptoms  Reviewed option for orthopedic surgery referral for surgical opinion if conservative care is not helpful  Reviewed option for Voltaren gel as a topical anti-inflammatory  Hand therapy also an option in the future, defer for now  We discussed modified progressive pain-free activity as tolerated  Home handouts provided and supportive care reviewed  All questions were answered today  Contact us with additional questions or concerns  Signs and sx of concern reviewed    Thanks very much for sending this nice gentleman to us, I will keep you updated with his progress      Dallas Somers DO, CAJONATAN  Sports Medicine Physician  [facility]            Disclaimer: This note consists of symbols derived  from keyboarding, dictation and/or voice recognition software. As a result, there may be errors in the script that have gone undetected. Please consider this when interpreting information found in this chart.      Again, thank you for allowing me to participate in the care of your patient.        Sincerely,        Dallas Somers, DO

## 2021-10-06 NOTE — PROGRESS NOTES
"Dalila Malone  :  1975  DOS: 10/6/2021  MRN: [id number]    Sports Medicine Clinic Visit    PCP: Elina Banks    aDlila Malone is a 46 year old Right hand dominant male who is seen in consultation at the request of  Sandy Henry M.D. presenting with right hand, finger pain.    Dalila presents today with increased pain and triggering of his third and fourth digits of his right hand. He is not had this problem before. The pain started within the last few months.   It bothers him when driving and with heavy  activity. These digits will often become stuck when he performs heavy  activity. He has tried over-the-counter pain medication without relief. Has not tried any other treatments outside of activity modification.    Social History: currently employed as     Review of Systems  Musculoskeletal: as above  Remainder of review of systems is negative including constitutional, CV, pulmonary, GI, Skin and Neurologic except as noted in HPI or medical history.    Past Medical History:   Diagnosis Date     Medical history non-contributory      Past Surgical History:   Procedure Laterality Date     NO PAST SURGERIES       Family History   Problem Relation Age of Onset     Diabetes Mother      Cerebrovascular Disease Mother      Hypertension Mother      Cerebrovascular Disease Father      Hypertension Father      Diabetes Father          Objective  /83   Ht 1.705 m (5' 7.13\")   Wt 107 kg (236 lb)   BMI 36.82 kg/m        General: healthy, alert and in no distress      HEENT: no scleral icterus or conjunctival erythema     Skin: no suspicious lesions or rash. No jaundice.     CV: regular rhythm by palpation, 2+ distal pulses, no pedal edema      Resp: normal respiratory effort without conversational dyspnea     Psych: normal mood and affect      Gait: nonantalgic, appropriate coordination and balance     Neuro: normal light touch sensory exam of the extremities. Motor strength as noted below "     Right hand exam  Inspection: Middle and long Finger:  slight swelling, over MCP, flexor tendon nodule  Tender: Middle and Long Finger:   MCP joint, triggering, A1 Pulley  Non-tender: remainder of hand  Range of Motion middle and Long Finger:   Painful full extension, triggering of A1 pulley with flexion  Strength: full strength      Radiology:  No formal imaging today    Assessment:  1. Trigger finger of right hand, unspecified finger    2. Bilateral hand pain        Plan:  Discussed the assessment with the patient.  Follow up: As needed based on clinical progress  Triggering of the third and fourth digits of his right hand over the last few months  Reviewed activity modification, padded glove usage, avoiding heavy   Oval 8 splints provided to prevent triggering and encourage rest of the tendon, use strategies reviewed  Reviewed option for corticosteroid injection to the A1 pulley tendon sheaths if needed for labile or worsening symptoms  Reviewed option for orthopedic surgery referral for surgical opinion if conservative care is not helpful  Reviewed option for Voltaren gel as a topical anti-inflammatory  Hand therapy also an option in the future, defer for now  We discussed modified progressive pain-free activity as tolerated  Home handouts provided and supportive care reviewed  All questions were answered today  Contact us with additional questions or concerns  Signs and sx of concern reviewed    Thanks very much for sending this nice gentleman to us, I will keep you updated with his progress      Dallas Somers DO, CAJONATAN  Sports Medicine Physician  [facility] Sports [facility]            Disclaimer: This note consists of symbols derived from keyboarding, dictation and/or voice recognition software. As a result, there may be errors in the script that have gone undetected. Please consider this when interpreting information found in this chart.

## 2021-10-08 ENCOUNTER — IMMUNIZATION (OUTPATIENT)
Dept: FAMILY MEDICINE | Facility: CLINIC | Age: 46
End: 2021-10-08
Payer: COMMERCIAL

## 2021-10-08 PROCEDURE — 90471 IMMUNIZATION ADMIN: CPT

## 2021-10-08 PROCEDURE — 90686 IIV4 VACC NO PRSV 0.5 ML IM: CPT

## 2021-10-08 PROCEDURE — 90715 TDAP VACCINE 7 YRS/> IM: CPT

## 2021-10-08 PROCEDURE — 90472 IMMUNIZATION ADMIN EACH ADD: CPT

## 2021-10-13 ENCOUNTER — OFFICE VISIT (OUTPATIENT)
Dept: OTOLARYNGOLOGY | Facility: CLINIC | Age: 46
End: 2021-10-13
Attending: FAMILY MEDICINE
Payer: COMMERCIAL

## 2021-10-13 DIAGNOSIS — K21.9 LARYNGOPHARYNGEAL REFLUX (LPR): ICD-10-CM

## 2021-10-13 DIAGNOSIS — J37.0 CHRONIC LARYNGITIS: Primary | ICD-10-CM

## 2021-10-13 PROCEDURE — 99243 OFF/OP CNSLTJ NEW/EST LOW 30: CPT | Mod: 25 | Performed by: OTOLARYNGOLOGY

## 2021-10-13 PROCEDURE — 31575 DIAGNOSTIC LARYNGOSCOPY: CPT | Performed by: OTOLARYNGOLOGY

## 2021-10-13 NOTE — LETTER
10/13/2021         RE: Dalila Malone  [address] [place] [address]        Dear Colleague,    Thank you for referring your patient, Dalila Malone, to the [facility]. Please see a copy of my visit note below.    HPI: This patient is a 45yo M who presents for evaluation of the throat at the request of Dr. Henry. There has been a globus sensation for a few months or so, accompanied with occasional hoarseness and occasional dry cough. Seemed to start after he quit smoking, ~15pack year smoking history. Has been snacking more with a little bit of weight gain. Denies fevers, otalgia, weight loss, odynophagia, dysphagia, hemoptysis, and shortness of breath. Does not complain of sour taste, heartburn, or burping now, but had issues with it before. Is not taking reflux medication regularly, but maybe once or twice per week for symptoms.    Past medical history, surgical history, social history, family history, medications, and allergies have been reviewed with the patient and are documented above.    Review of Systems: a 10-system review was performed. Pertinent positives are noted in the HPI and on a separate scanned document in the chart.    PHYSICAL EXAMINATION:  GEN: no acute distress, normocephalic  EYES: extraocular movements are intact, pupils are equal and round. Sclera clear.   EARS: auricles are normally formed. The external auditory canals are clear with minimal to no cerumen. Tympanic membranes are intact bilaterally with no signs of infection, effusion, retractions, or perforations.  NOSE: anterior nares are patent. There are no masses or lesions. The septum is non-obstructing.  OC/OP: clear, dentition is in good repair. The tongue and palate are fully mobile and symmetric. No masses or lesions. Cobblestoning of the posterior pharyngeal wall.  HP/L (scope): nasopharynx, base of tongue, vallecula, epiglottis, and pyriform sinuses are clear. The bilateral vocal folds are mobile and  without lesion. There is interarytenoid pachydermia and some hyperemia of the laryngeal surface of the epiglottis c/w LPR.  NECK: soft and supple. No lymphadenopathy or masses. Airway is midline.  NEURO: CN VII and XII symmetric. alert and oriented. No spontaneous nystagmus. Gait is normal.  PULM: breathing comfortably on room air, normal chest expansion with respiration  CARDS: no cyanosis or clubbing, normal carotid pulses    FLEXIBLE LARYNGOSCOPY: Scope inserted bilaterally to examine nasal tissue, nasopharynx, posterior oropharynx, hypopharynx, and larynx. See exam notes for exam finding details. Patient tolerated the procedure well.    MEDICAL DECISION-MAKING: This patient is a 47yo M with chronic laryngitis/globus sensation from acid reflux. Discussed diet and lifestyle changes, including weight loss, in addition to risks and benefits of changing his PRN PPI therapy to daily for the short-medium term. Can follow-up with PCP or GI for further management moving forward.        Again, thank you for allowing me to participate in the care of your patient.        Sincerely,        Deedee Arriaga MD

## 2021-10-13 NOTE — PROGRESS NOTES
HPI: This patient is a 47yo M who presents for evaluation of the throat at the request of Dr. Henry. There has been a globus sensation for a few months or so, accompanied with occasional hoarseness and occasional dry cough. Seemed to start after he quit smoking, ~15pack year smoking history. Has been snacking more with a little bit of weight gain. Denies fevers, otalgia, weight loss, odynophagia, dysphagia, hemoptysis, and shortness of breath. Does not complain of sour taste, heartburn, or burping now, but had issues with it before. Is not taking reflux medication regularly, but maybe once or twice per week for symptoms.    Past medical history, surgical history, social history, family history, medications, and allergies have been reviewed with the patient and are documented above.    Review of Systems: a 10-system review was performed. Pertinent positives are noted in the HPI and on a separate scanned document in the chart.    PHYSICAL EXAMINATION:  GEN: no acute distress, normocephalic  EYES: extraocular movements are intact, pupils are equal and round. Sclera clear.   EARS: auricles are normally formed. The external auditory canals are clear with minimal to no cerumen. Tympanic membranes are intact bilaterally with no signs of infection, effusion, retractions, or perforations.  NOSE: anterior nares are patent. There are no masses or lesions. The septum is non-obstructing.  OC/OP: clear, dentition is in good repair. The tongue and palate are fully mobile and symmetric. No masses or lesions. Cobblestoning of the posterior pharyngeal wall.  HP/L (scope): nasopharynx, base of tongue, vallecula, epiglottis, and pyriform sinuses are clear. The bilateral vocal folds are mobile and without lesion. There is interarytenoid pachydermia and some hyperemia of the laryngeal surface of the epiglottis c/w LPR.  NECK: soft and supple. No lymphadenopathy or masses. Airway is midline.  NEURO: CN VII and XII symmetric. alert and  oriented. No spontaneous nystagmus. Gait is normal.  PULM: breathing comfortably on room air, normal chest expansion with respiration  CARDS: no cyanosis or clubbing, normal carotid pulses    FLEXIBLE LARYNGOSCOPY: Scope inserted bilaterally to examine nasal tissue, nasopharynx, posterior oropharynx, hypopharynx, and larynx. See exam notes for exam finding details. Patient tolerated the procedure well.    MEDICAL DECISION-MAKING: This patient is a 45yo M with chronic laryngitis/globus sensation from acid reflux. Discussed diet and lifestyle changes, including weight loss, in addition to risks and benefits of changing his PRN PPI therapy to daily for the short-medium term. Can follow-up with PCP or GI for further management moving forward.

## 2021-12-02 ENCOUNTER — MYC MEDICAL ADVICE (OUTPATIENT)
Dept: FAMILY MEDICINE | Facility: CLINIC | Age: 46
End: 2021-12-02
Payer: COMMERCIAL

## 2021-12-02 DIAGNOSIS — M79.10 MUSCULAR ACHES: Primary | ICD-10-CM

## 2021-12-02 RX ORDER — CYCLOBENZAPRINE HCL 5 MG
5 TABLET ORAL
Qty: 30 TABLET | Refills: 1 | Status: SHIPPED | OUTPATIENT
Start: 2021-12-02 | End: 2024-08-02

## 2022-04-11 ENCOUNTER — MYC MEDICAL ADVICE (OUTPATIENT)
Dept: FAMILY MEDICINE | Facility: CLINIC | Age: 47
End: 2022-04-11
Payer: COMMERCIAL

## 2022-04-11 RX ORDER — FLUTICASONE PROPIONATE 50 MCG
2 SPRAY, SUSPENSION (ML) NASAL DAILY
COMMUNITY
Start: 2021-09-01

## 2022-04-11 NOTE — TELEPHONE ENCOUNTER
Patient stated that using flonase is not working for him anymore. He is having difficulty breathing through his left nostril. He also has complaints of his left nostril feeling dry and hurting him during the day.     Should he see you to follow up or go back to ENT?    Thank you!    Desiree GALVAN RN

## 2022-06-30 ENCOUNTER — APPOINTMENT (OUTPATIENT)
Dept: MRI IMAGING | Facility: HOSPITAL | Age: 47
End: 2022-06-30
Attending: EMERGENCY MEDICINE
Payer: COMMERCIAL

## 2022-06-30 ENCOUNTER — OFFICE VISIT (OUTPATIENT)
Dept: URGENT CARE | Facility: URGENT CARE | Age: 47
End: 2022-06-30
Payer: COMMERCIAL

## 2022-06-30 ENCOUNTER — APPOINTMENT (OUTPATIENT)
Dept: RADIOLOGY | Facility: HOSPITAL | Age: 47
End: 2022-06-30
Attending: EMERGENCY MEDICINE
Payer: COMMERCIAL

## 2022-06-30 ENCOUNTER — HOSPITAL ENCOUNTER (EMERGENCY)
Facility: HOSPITAL | Age: 47
Discharge: HOME | End: 2022-06-30
Attending: EMERGENCY MEDICINE | Admitting: EMERGENCY MEDICINE
Payer: COMMERCIAL

## 2022-06-30 VITALS
OXYGEN SATURATION: 95 % | TEMPERATURE: 98.1 F | RESPIRATION RATE: 20 BRPM | DIASTOLIC BLOOD PRESSURE: 105 MMHG | HEART RATE: 60 BPM | SYSTOLIC BLOOD PRESSURE: 179 MMHG | WEIGHT: 246.9 LBS | HEIGHT: 68 IN | BODY MASS INDEX: 37.42 KG/M2

## 2022-06-30 VITALS
TEMPERATURE: 97.8 F | SYSTOLIC BLOOD PRESSURE: 155 MMHG | DIASTOLIC BLOOD PRESSURE: 99 MMHG | HEART RATE: 71 BPM | OXYGEN SATURATION: 97 % | BODY MASS INDEX: 38.17 KG/M2 | HEIGHT: 67 IN | WEIGHT: 243.2 LBS

## 2022-06-30 DIAGNOSIS — R42 VERTIGO: ICD-10-CM

## 2022-06-30 DIAGNOSIS — R94.31 NONSPECIFIC ST-T WAVE ELECTROCARDIOGRAPHIC CHANGES: ICD-10-CM

## 2022-06-30 DIAGNOSIS — R42 DIZZINESS: Primary | ICD-10-CM

## 2022-06-30 PROBLEM — I10 BENIGN ESSENTIAL HYPERTENSION: Status: ACTIVE | Noted: 2019-07-19

## 2022-06-30 LAB
ANION GAP SERPL CALCULATED.3IONS-SCNC: 9 MMOL/L (ref 5–18)
BASOPHILS # BLD AUTO: 0 10E3/UL (ref 0–0.2)
BASOPHILS NFR BLD AUTO: 0 %
BUN SERPL-MCNC: 16 MG/DL (ref 8–22)
CALCIUM SERPL-MCNC: 8.8 MG/DL (ref 8.5–10.5)
CHLORIDE BLD-SCNC: 107 MMOL/L (ref 98–107)
CO2 SERPL-SCNC: 23 MMOL/L (ref 22–31)
CREAT SERPL-MCNC: 1.07 MG/DL (ref 0.7–1.3)
EOSINOPHIL # BLD AUTO: 0.4 10E3/UL (ref 0–0.7)
EOSINOPHIL NFR BLD AUTO: 5 %
ERYTHROCYTE [DISTWIDTH] IN BLOOD BY AUTOMATED COUNT: 12.5 % (ref 10–15)
GFR SERPL CREATININE-BSD FRML MDRD: 86 ML/MIN/1.73M2
GLUCOSE BLD-MCNC: 144 MG/DL (ref 70–125)
HCT VFR BLD AUTO: 41 % (ref 40–53)
HGB BLD-MCNC: 13.9 G/DL (ref 13.3–17.7)
IMM GRANULOCYTES # BLD: 0.1 10E3/UL
IMM GRANULOCYTES NFR BLD: 1 %
LYMPHOCYTES # BLD AUTO: 1.7 10E3/UL (ref 0.8–5.3)
LYMPHOCYTES NFR BLD AUTO: 21 %
MCH RBC QN AUTO: 31.3 PG (ref 26.5–33)
MCHC RBC AUTO-ENTMCNC: 33.9 G/DL (ref 31.5–36.5)
MCV RBC AUTO: 92 FL (ref 78–100)
MONOCYTES # BLD AUTO: 0.4 10E3/UL (ref 0–1.3)
MONOCYTES NFR BLD AUTO: 5 %
NEUTROPHILS # BLD AUTO: 5.5 10E3/UL (ref 1.6–8.3)
NEUTROPHILS NFR BLD AUTO: 68 %
NRBC # BLD AUTO: 0 10E3/UL
NRBC BLD AUTO-RTO: 0 /100
PLATELET # BLD AUTO: 202 10E3/UL (ref 150–450)
POTASSIUM BLD-SCNC: 3.9 MMOL/L (ref 3.5–5)
RBC # BLD AUTO: 4.44 10E6/UL (ref 4.4–5.9)
SODIUM SERPL-SCNC: 139 MMOL/L (ref 136–145)
TROPONIN I SERPL-MCNC: 0.03 NG/ML (ref 0–0.29)
WBC # BLD AUTO: 8 10E3/UL (ref 4–11)

## 2022-06-30 PROCEDURE — 99215 OFFICE O/P EST HI 40 MIN: CPT | Performed by: FAMILY MEDICINE

## 2022-06-30 PROCEDURE — 99285 EMERGENCY DEPT VISIT HI MDM: CPT | Mod: 25

## 2022-06-30 PROCEDURE — 70553 MRI BRAIN STEM W/O & W/DYE: CPT

## 2022-06-30 PROCEDURE — 70549 MR ANGIOGRAPH NECK W/O&W/DYE: CPT

## 2022-06-30 PROCEDURE — 82310 ASSAY OF CALCIUM: CPT | Performed by: EMERGENCY MEDICINE

## 2022-06-30 PROCEDURE — 85025 COMPLETE CBC W/AUTO DIFF WBC: CPT | Performed by: EMERGENCY MEDICINE

## 2022-06-30 PROCEDURE — 84484 ASSAY OF TROPONIN QUANT: CPT | Performed by: EMERGENCY MEDICINE

## 2022-06-30 PROCEDURE — 70544 MR ANGIOGRAPHY HEAD W/O DYE: CPT

## 2022-06-30 PROCEDURE — A9585 GADOBUTROL INJECTION: HCPCS

## 2022-06-30 PROCEDURE — 36415 COLL VENOUS BLD VENIPUNCTURE: CPT | Performed by: EMERGENCY MEDICINE

## 2022-06-30 PROCEDURE — 93005 ELECTROCARDIOGRAM TRACING: CPT | Performed by: EMERGENCY MEDICINE

## 2022-06-30 PROCEDURE — 93000 ELECTROCARDIOGRAM COMPLETE: CPT | Performed by: FAMILY MEDICINE

## 2022-06-30 PROCEDURE — 255N000002 HC RX 255 OP 636

## 2022-06-30 PROCEDURE — 71046 X-RAY EXAM CHEST 2 VIEWS: CPT

## 2022-06-30 RX ORDER — GADOBUTROL 604.72 MG/ML
10 INJECTION INTRAVENOUS ONCE
Status: COMPLETED | OUTPATIENT
Start: 2022-06-30 | End: 2022-06-30

## 2022-06-30 RX ORDER — MECLIZINE HCL 12.5 MG 12.5 MG/1
12.5 TABLET ORAL 4 TIMES DAILY PRN
Qty: 30 TABLET | Refills: 0 | Status: SHIPPED | OUTPATIENT
Start: 2022-06-30 | End: 2023-01-23

## 2022-06-30 RX ADMIN — GADOBUTROL 10 ML: 604.72 INJECTION INTRAVENOUS at 20:59

## 2022-06-30 ASSESSMENT — ENCOUNTER SYMPTOMS
VOMITING: 0
WEAKNESS: 0
DIZZINESS: 1
NAUSEA: 0
NUMBNESS: 0

## 2022-06-30 NOTE — ED NOTES
"ED Provider In Triage Note  [facility]  Encounter Date: Jun 30, 2022    Chief Complaint   Patient presents with     Dizziness     Sweats       Brief HPI:   Dalila Malone is a 47 year old male presenting to the Emergency Department with a chief complaint of dizziness.  Patient states that he has had intermittent room spinning dizziness for the past 3 days.  Symptoms are worse with certain positions.  Was sent from clinic for further evaluation.  Denies any chest pain or shortness of breath.  Currently, patient has no room spinning dizziness or focal weakness.    Brief Physical Exam:  BP (!) 176/107   Pulse 72   Temp 98.1  F (36.7  C) (Oral)   Resp 20   Ht 1.727 m (5' 8\")   Wt 112 kg (246 lb 14.4 oz)   SpO2 97%   BMI 37.54 kg/m    General: Non-toxic appearing  HEENT: Atraumatic  Resp: No respiratory distress  Abdomen: Non-peritoneal  Neuro: Alert, oriented, answers questions appropriately  Psych: Behavior appropriate      Plan Initiated in Triage:  Orders Placed This Encounter     XR Chest 2 Views     MR Brain w/o & w Contrast     MRA Brain ([facility] [place]) wo Contrast     MRA Neck (Carotids) wo & w Contrast     Basic metabolic panel     Troponin I       PIT Dispo:   Return to lobby while awaiting workup and ED bed availability    VISHAL RAVI MD on 6/30/2022 at 6:42 PM    Patient was evaluated by the Physician in Triage due to a limitation of available rooms in the Emergency Department. A plan of care was discussed based on the information obtained on the initial evaluation and patient was consuled to return back to the Emergency Department lobby after this initial evalutaiton until results were obtained or a room became available in the Emergency Department. Patient was counseled not to leave prior to receiving the results of their workup.     VISHAL RAVI MD  [facility] EMERGENCY DEPARTMENT  [address] [place] [id number] [phone]   "   Seymour Mera MD  06/30/22 [id number]

## 2022-06-30 NOTE — ED TRIAGE NOTES
Patient arrives to triage from Urgent Care in [place] with chief complaint of intermittent dizziness which started Tuesday afternoon while at work.  Patient reports dizziness worsens with standing and bending over, better when sitting.  Patient was diaphoretic earlier today at the clinic.  Denies chest pain, no shortness of breath.  Alert and oriented x4.

## 2022-06-30 NOTE — PROGRESS NOTES
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   Spinning sensation lasted for a couple of hours   Patient couldn't walk because of how spinning he felt   Patient went home - patient laid on the bed for 2-3 hours then patient got up to go to bathroom symptoms seemed to improve although patient still felt like he could fall over.     Yesterday seemed ok feeling better    WEnt back to work today this morning and everything seemed fine until 10 am again on the bus - patient was driving and same exact symptoms started  Very severe that he couldn't walk   Also saw the \"yellow\" in his eyes for couple of seconds  Cold sweats went away after 30 minutes   Severe symptoms of dizziness lasted for a few hours  Aggravating factors: worse when he moves his head and also if he gets up too fast   Relieving factors: if he focuses on one spot seems to be better  But if he moves his eyes around then it worse   Chest pain or palpitation: No  Syncope or presyncope: not sure - but he would get a flash   Motor,sensory weakness, or slurring of speech: No  Headache: No  Blurring of vision : yes episodes with it   Nausea: NO  Vomiting: No  Abdominal pain: No  Recent trauma: No     Patient has polyphagia and polydipsia but no polyuria     Tried supportive treatment  At home no relief  Additional Information:       Problem list and histories reviewed & adjusted, as indicated.  Additional history: as documented    Problem list, Medication list, Allergies, and Medical/Social/Surgical histories reviewed in EPIC and updated as " "appropriate.    ROS:  Constitutional, HEENT, cardiovascular, pulmonary, gi and gu systems are negative, except as otherwise noted.    OBJECTIVE:                                                    BP (!) 155/99 (BP Location: Right arm, Patient Position: Sitting, Cuff Size: Adult Large)   Pulse 71   Temp 97.8  F (36.6  C) (Tympanic)   Ht 1.702 m (5' 7\")   Wt 110.3 kg (243 lb 3.2 oz)   SpO2 97%   BMI 38.09 kg/m    Body mass index is 38.09 kg/m .  GENERAL: healthy, alert and no distress  EYES: Eyes grossly normal to inspection, PERRL and conjunctivae and sclerae normal  HENT: ear canals and TM's normal, nose and mouth without ulcers or lesions  NECK: no adenopathy, no asymmetry, masses, or scars and thyroid normal to palpation  RESP: lungs clear to auscultation - no rales, rhonchi or wheezes  CV: regular rate and rhythm, normal S1 S2, no S3 or S4, no murmur, click or rub, no peripheral edema and peripheral pulses strong  ABDOMEN: soft, nontender, no hepatosplenomegaly, no masses and bowel sounds normal  MS: no gross musculoskeletal defects noted, no edema  SKIN: no suspicious lesions or rashes  NEURO: Normal strength and tone, mentation intact and speech normal  PSYCH: mentation appears normal, affect normal/bright    Diagnostic Test Results:  No results found for this or any previous visit (from the past 24 hour(s)).     ASSESSMENT/PLAN:                                                        ICD-10-CM    1. Dizziness  R42 EKG 12-lead complete w/read - Clinics   2. Nonspecific ST-T wave electrocardiographic changes  R94.31      EKG to my review some flattening of twave on lateral leads   Couple of episodes of dizziness associated with diaphoresis and visual changes   EKG flattening twave on lateral leads  Recommend ASAP ER evaluation   Patient declined ambulance, risk of transport discussed  AVS printed with sign out information     MD Shyanne Bender MD  [facility] " [facility] [place]

## 2022-07-01 LAB
ATRIAL RATE - MUSE: 58 BPM
DIASTOLIC BLOOD PRESSURE - MUSE: NORMAL MMHG
INTERPRETATION ECG - MUSE: NORMAL
P AXIS - MUSE: 19 DEGREES
PR INTERVAL - MUSE: 144 MS
QRS DURATION - MUSE: 108 MS
QT - MUSE: 416 MS
QTC - MUSE: 408 MS
R AXIS - MUSE: 47 DEGREES
SYSTOLIC BLOOD PRESSURE - MUSE: NORMAL MMHG
T AXIS - MUSE: -4 DEGREES
VENTRICULAR RATE- MUSE: 58 BPM

## 2022-07-01 NOTE — ED PROVIDER NOTES
EMERGENCY DEPARTMENT ENCOUNTER      NAME: Dalila Malone  AGE: 47 year old male  YOB: 1975  MRN: [id number]  EVALUATION DATE & TIME: No admission date for patient encounter.    PCP: Elina Banks    ED PROVIDER: Juan R Sethi M.D.      Chief Complaint   Patient presents with     Dizziness     Sweats         FINAL IMPRESSION:  1. Vertigo          ED COURSE & MEDICAL DECISION MAKING:    Pertinent Labs & Imaging studies reviewed. (See chart for details)  47 year old male presents to the Emergency Department for evaluation of vertigo.  Patient sent over for worsening vertigo.  Started a few days ago.  Has been persistent.  No focal neurologic deficits.  Some nystagmus that is fatigable.  MRI was done and does not show any obvious abnormality.  This seems more likely peripheral vertigo.  Labs otherwise unremarkable.  EKG is normal.  I do not think this is cardiac.  Will give meclizine for symptomatic treatment at home.  Follow-up with primary.  Return for worsening symptoms.  Normal gait here.  Patient discharged    11:10 PM I met with the patient to gather history and to perform my initial exam. I discussed the plan for care while in the Emergency Department. PPE: Facemask, goggles and gloves  11:18 PM We discussed the plan for discharge and the patient is agreeable. Reviewed supportive cares, symptomatic treatment, outpatient follow up, and reasons to return to the Emergency Department. All questions and concerns were addressed. Patient to be discharged by ED RN.      At the conclusion of the encounter I discussed the results of all of the tests and the disposition. The questions were answered. The patient or family acknowledged understanding and was agreeable with the care plan.           MEDICATIONS GIVEN IN THE EMERGENCY:  Medications   gadobutrol (GADAVIST) injection 10 mL (10 mLs Intravenous Given 6/30/22 2059)       NEW PRESCRIPTIONS STARTED AT TODAY'S ER VISIT  Discharge Medication List as of  6/30/2022 11:26 PM      START taking these medications    Details   meclizine (ANTIVERT) 12.5 MG tablet Take 1 tablet (12.5 mg) by mouth 4 times daily as needed for dizziness, Disp-30 tablet, R-0, Local Print                =================================================================    HPI    Patient information was obtained from: patient     Use of : N/A         Dalila HARDING Faisal is a 47 year old male with a pertinent history of hypertension who presents to this ED via walk in for evaluation of dizziness.     Two days ago while training a new , the patient developed sudden onset dizziness and cold sweats. He notes his symptoms are worse with certain positions. The patient denies and chest pain, nausea, vomiting, vision changes, weakness, numbness, or any other complaints at this time.     REVIEW OF SYSTEMS   Review of Systems   Constitutional:        Positive for cold sweats   Eyes: Negative for visual disturbance.   Cardiovascular: Negative for chest pain.   Gastrointestinal: Negative for nausea and vomiting.   Neurological: Positive for dizziness. Negative for weakness and numbness.   All other systems reviewed and are negative.     PAST MEDICAL HISTORY:  Past Medical History:   Diagnosis Date     Medical history non-contributory        PAST SURGICAL HISTORY:  Past Surgical History:   Procedure Laterality Date     NO PAST SURGERIES             CURRENT MEDICATIONS:    No current facility-administered medications for this encounter.     Current Outpatient Medications   Medication     meclizine (ANTIVERT) 12.5 MG tablet     acetaminophen (TYLENOL) 500 MG tablet     albuterol (PROVENTIL HFA) 108 (90 Base) MCG/ACT inhaler     aspirin-acetaminophen-caffeine (EXCEDRIN MIGRAINE) 250-250-65 MG tablet     cyclobenzaprine (FLEXERIL) 5 MG tablet     fluticasone (FLONASE) 50 MCG/ACT nasal spray     losartan (COZAAR) 25 MG tablet     montelukast (SINGULAIR) 10 MG tablet     sertraline (ZOLOFT) 50 MG tablet  "        ALLERGIES:  No Known Allergies    FAMILY HISTORY:  Family History   Problem Relation Age of Onset     Diabetes Mother      Cerebrovascular Disease Mother      Hypertension Mother      Cerebrovascular Disease Father      Hypertension Father      Diabetes Father        SOCIAL HISTORY:   Social History     Socioeconomic History     Marital status:    Tobacco Use     Smoking status: Former Smoker     Packs/day: 0.25     Types: Cigarettes     Smokeless tobacco: Never Used     Tobacco comment: smokes 6-8 per day   Substance and Sexual Activity     Alcohol use: Yes     Comment: Alcoholic Drinks/day: occasional     Drug use: Never       VITALS:  BP (!) 179/105   Pulse 60   Temp 98.1  F (36.7  C) (Oral)   Resp 20   Ht 1.727 m (5' 8\")   Wt 112 kg (246 lb 14.4 oz)   SpO2 95%   BMI 37.54 kg/m      PHYSICAL EXAM    Physical Exam  Constitutional:       General: He is not in acute distress.     Appearance: He is not diaphoretic.   HENT:      Head: Atraumatic.   Eyes:      General: No scleral icterus.     Pupils: Pupils are equal, round, and reactive to light.   Cardiovascular:      Heart sounds: Normal heart sounds.   Pulmonary:      Effort: No respiratory distress.      Breath sounds: Normal breath sounds.   Abdominal:      General: Bowel sounds are normal.      Palpations: Abdomen is soft.      Tenderness: There is no abdominal tenderness.   Musculoskeletal:         General: No tenderness.   Skin:     General: Skin is warm.      Findings: No rash.   Neurological:      Comments: 5 out of 5 strength in bilateral upper and lower extremities.  Sensation intact in all 4 extremes.  Cranial nerves intact.  No pronator drift  Horizontal nsytagmus             LAB:  All pertinent labs reviewed and interpreted.  Labs Ordered and Resulted from Time of ED Arrival to Time of ED Departure   BASIC METABOLIC PANEL - Abnormal       Result Value    Sodium 139      Potassium 3.9      Chloride 107      Carbon Dioxide (CO2) 23   "    Anion Gap 9      Urea Nitrogen 16      Creatinine 1.07      Calcium 8.8      Glucose 144 (*)     GFR Estimate 86     TROPONIN I - Normal    Troponin I 0.03     CBC WITH PLATELETS AND DIFFERENTIAL    WBC Count 8.0      RBC Count 4.44      Hemoglobin 13.9      Hematocrit 41.0      MCV 92      MCH 31.3      MCHC 33.9      RDW 12.5      Platelet Count 202      % Neutrophils 68      % Lymphocytes 21      % Monocytes 5      % Eosinophils 5      % Basophils 0      % Immature Granulocytes 1      NRBCs per 100 WBC 0      Absolute Neutrophils 5.5      Absolute Lymphocytes 1.7      Absolute Monocytes 0.4      Absolute Eosinophils 0.4      Absolute Basophils 0.0      Absolute Immature Granulocytes 0.1      Absolute NRBCs 0.0         RADIOLOGY:  Reviewed all pertinent imaging. Please see official radiology report.  XR Chest 2 Views   Final Result   IMPRESSION: Negative chest.  The lungs are clear and there are no pleural effusions. Normal heart size.      MRA Neck (Carotids) wo & w Contrast   Final Result   IMPRESSION:      HEAD MRA:    1.  Normal MRA [place].      NECK MRA:   1.  Normal neck MRA.      MRA Brain ([place]) wo Contrast   Final Result   IMPRESSION:      HEAD MRA:    1.  Normal MRA [place].      NECK MRA:   1.  Normal neck MRA.      MR Brain w/o & w Contrast   Final Result   IMPRESSION:   1.  No acute intracranial process.   2.  Mild-to-moderate paranasal sinus disease.          EKG:    Performed at: 1957  Impression: Sinus bradycardia.  Nonspecific ST changes.  Unchanged from previous dated April 19, 2017  Sinus bradycardia ventricular to 58.  .  .  QTc 408.    I have independently reviewed and interpreted the EKG(s) documented above.    PROCEDURES:   None      I, Alta Rai, am serving as a scribe to document services personally performed by Dr. Juan R Sethi, based on my observation and the provider's statements to me. I, Juan R Sethi MD attest that Alta Rai is  acting in a scribe capacity, has observed my performance of the services and has documented them in accordance with my direction.    Juan R Sethi M.D.  Emergency Medicine  [facility] EMERGENCY DEPARTMENT  [address] [place] [id number] [phone]  Dept: [phone]     Juan R Sethi MD  07/01/22 0139

## 2022-07-05 ENCOUNTER — MYC MEDICAL ADVICE (OUTPATIENT)
Dept: FAMILY MEDICINE | Facility: CLINIC | Age: 47
End: 2022-07-05

## 2022-07-05 NOTE — TELEPHONE ENCOUNTER
Spoke to patient on the phone regarding ER visit and his not being seen by Dr. Banks for over two years.  Patient reported an appt has been made at the [place] location with another provider to follow up on ER visit and to address the vertigo.  RN advised patient to address the when to return to work letter from provider at appt scheduled for 7/6/2022.  Patient agreed.    KATRIN Ross, RN  [facility]

## 2022-07-06 ENCOUNTER — OFFICE VISIT (OUTPATIENT)
Dept: INTERNAL MEDICINE | Facility: CLINIC | Age: 47
End: 2022-07-06
Payer: COMMERCIAL

## 2022-07-06 VITALS
WEIGHT: 242 LBS | HEIGHT: 68 IN | BODY MASS INDEX: 36.68 KG/M2 | HEART RATE: 72 BPM | SYSTOLIC BLOOD PRESSURE: 132 MMHG | DIASTOLIC BLOOD PRESSURE: 80 MMHG | OXYGEN SATURATION: 98 % | TEMPERATURE: 98.6 F

## 2022-07-06 DIAGNOSIS — Z86.59 HISTORY OF DEPRESSIVE SYMPTOMS: ICD-10-CM

## 2022-07-06 DIAGNOSIS — E66.01 SEVERE OBESITY (BMI 35.0-35.9 WITH COMORBIDITY) (H): ICD-10-CM

## 2022-07-06 DIAGNOSIS — R73.03 PREDIABETES: ICD-10-CM

## 2022-07-06 DIAGNOSIS — Z13.6 CARDIOVASCULAR SCREENING; LDL GOAL LESS THAN 130: ICD-10-CM

## 2022-07-06 DIAGNOSIS — R09.81 NASAL CONGESTION: ICD-10-CM

## 2022-07-06 DIAGNOSIS — H81.13 BENIGN PAROXYSMAL POSITIONAL VERTIGO, BILATERAL: Primary | ICD-10-CM

## 2022-07-06 DIAGNOSIS — I10 BENIGN ESSENTIAL HYPERTENSION: ICD-10-CM

## 2022-07-06 PROCEDURE — 99214 OFFICE O/P EST MOD 30 MIN: CPT | Performed by: INTERNAL MEDICINE

## 2022-07-06 ASSESSMENT — PATIENT HEALTH QUESTIONNAIRE - PHQ9
SUM OF ALL RESPONSES TO PHQ QUESTIONS 1-9: 10
SUM OF ALL RESPONSES TO PHQ QUESTIONS 1-9: 10
10. IF YOU CHECKED OFF ANY PROBLEMS, HOW DIFFICULT HAVE THESE PROBLEMS MADE IT FOR YOU TO DO YOUR WORK, TAKE CARE OF THINGS AT HOME, OR GET ALONG WITH OTHER PEOPLE: EXTREMELY DIFFICULT

## 2022-07-06 NOTE — LETTER
July 6, 2022      Dalila Malone  [address]  [place] [address]        To whom it may concern:    Dalila Malone suffered an acute attack of vertigo due to inflammation in the inner ear/labyrinth system on 6/30/22.  He was seen in the Emergency Room.  This vertigo produces intense dizziness and nausea, specifically worse with changing of head position.  He has shown nice improvement, but has not been able to drive safely since that time.  I expect that he will fully recover in the next few to several days.  I have recommended that he not drive a car or bus until the dizziness is gone and when he can move his head without dizziness.  I expect that he should be able to return to driving in one week.      Sincerely,      Desmond Waldron M.D.  Dept. of Internal Medicine  [facility]

## 2022-07-06 NOTE — PATIENT INSTRUCTIONS
"   Continue all medications at the same doses.  Contact your usual pharmacy if you need refills.      Work on your diet, this is the most powerful way to control and prevent type II diabetes and prevent the progression of \"pre-diabetes\"        BENIGN POSITIONAL VERTIGO (positional dizziness):      Your symptoms will continue to improve slowly over the next few days.       Do not return to work or driving until the dizziness is gone and you can move your head without dizziness.  This will liekly rapidly improve over the next few days, most liekly by early to mid next week.      *  This is usually caused by a viral URI causing temporary inflammation in the inner ear balance apparatus (semicircular canals or labyrinths).  The is usually transient and will go away on its own after a couple of weeks.  There is no specific treatment for this.  Make sure that you move your head and changes positions slowly and deliberately.  Also be sure to maintain a good degree of hydration and adequate nutrition.      *  Meclizine (generic Antivert) 12.5 or 25 mg three times per day as needed to help reduce the frequency and intensity of these dizziness.  You can get this over the counter.  This will not treat a specific episode, it will hopefully help decrease the intensity and frequency of the episodes.  Beware of drowsiness.       *  Be sure to move your head and change body positions more slowly and deliberately as to help minimize the movement of the fluid in the inner ear and thereby reduce the dizziness.     *  Consider Epley maneuvers to help \"reposition\" the small crystal in the inner portion of the ear to each side as directed once a day.  Search YouTube on the internet for \"Epley Maneuver\"    Start going to left and back.  Hold each position for at least 30 seconds or until the dizziness goes away.  Stay/sleep more upright after doing this and in general.    The do the right and back.    Hold each position for at least 30 " "seconds or until the dizziness goes away.  Stay/sleep more upright after doing this and in general.    *  Call if new or worsening or persisting problems.       Referral to special physical therapy for balance (\"vestibular rehabilitation\") if you continue to have dizziness or imbalance.             5 GOALS TO PREVENT VASCULAR DISEASE:     1.  Aggressive blood pressure control, under 130/80 ideally.  Using medications if needed.    Your blood pressure is under good control    BP Readings from Last 4 Encounters:   07/06/22 132/80   06/30/22 (!) 179/105 (in ER while sick)   06/30/22 (!) 155/99 (in Urgent Care while sick)   10/06/21 130/83       2.  Aggressive LDL cholesterol (\"bad cholesterol\") lowering as indicated.    Your goal is an LDL under 130 for sure, preferably under 100.  (If you have diabetes or previous vascular disease, the the LDL goals would be under 100 for sure, preferably under 70.)    New guidelines identify four high-risk groups who could benefit from statins:   *people with pre-existing heart disease, such as those who have had a heart attack;   *people ages 40 to 75 who have diabetes of any type  *patients ages 40 to 75 with at least a 7.5% risk of developing cardiovascular disease over the next decade, according to a formula described in the guidelines  *patients with the sort of super-high cholesterol that sometimes runs in families, as evidenced by an LDL of 190 milligrams per deciliter or higher    Your cholesterol levels are well controlled.    Recent Labs   Lab Test 09/23/21  1109 03/02/20  1950   CHOL 252* 296*   HDL 38* 42   *  --    TRIG 308* 423*       3.  Aggressive diabetic prevention, screening and/or management.      You do not have diabetes as of the most recent blood tests.     4.  No smoking    5.  Consider daily preventative aspirin over age 50 if you have enough cardiac risk factors to place you at higher risk for the presence of vascular disease.             --Good " "Grains:  Oats, brown rice, Quinoa (these do not raise the blood sugar as much)     --Bad grains:  Anything made from wheat or white rice     (because these raise the blood sugars significantly, and the possible gluten issue from wheat for some people).      --Proteins:  Aim for \"lean proteins\" including chicken, fish, seafood, pork, turkey, and eggs (in moderation); Eat red meat only occasionally                  Mika Dawn:                "

## 2022-07-06 NOTE — PROGRESS NOTES
"  Assessment & Plan     (H81.13) Benign paroxysmal positional vertigo, bilateral  (primary encounter diagnosis)  Comment:        Your symptoms will continue to improve slowly over the next few days.         Do not return to work or driving until the dizziness is gone and you can move your head without dizziness.  This will liekly rapidly improve over the next few days, most liekly by early to mid next week.      *Letter given for his work    *  This is usually caused by a viral URI causing temporary inflammation in the inner ear balance apparatus (semicircular canals or labyrinths).  The is usually transient and will go away on its own after a couple of weeks.  There is no specific treatment for this.  Make sure that you move your head and changes positions slowly and deliberately.  Also be sure to maintain a good degree of hydration and adequate nutrition.      *  Meclizine (generic Antivert) 12.5 or 25 mg three times per day as needed to help reduce the frequency and intensity of these dizziness.  You can get this over the counter.  This will not treat a specific episode, it will hopefully help decrease the intensity and frequency of the episodes.  Beware of drowsiness.       *  Be sure to move your head and change body positions more slowly and deliberately as to help minimize the movement of the fluid in the inner ear and thereby reduce the dizziness.     *  Consider Epley maneuvers to help \"reposition\" the small crystal in the inner portion of the ear to each side as directed once a day.  Search YouTube on the internet for \"Epley Maneuver\"    Start going to left and back.  Hold each position for at least 30 seconds or until the dizziness goes away.  Stay/sleep more upright after doing this and in general.    The do the right and back.    Hold each position for at least 30 seconds or until the dizziness goes away.  Stay/sleep more upright after doing this and in general.    *  Call if new or worsening or " "persisting problems.         Referral to special physical therapy for balance (\"vestibular rehabilitation\") if you continue to have dizziness or imbalance.       Plan: Physical Therapy Referral            (I10) Benign essential hypertension  Comment: This condition is currently controlled on the current medical regimen.  Continue current therapy.   Discussed current hypertension treatment guidelines, including indications for treatment and treatment options.  Discussed the importance for aggressive management of HTN to prevent vascular complications later.  Recommended lower fat, lower carbohydrate, and lower sodium (<2000 mg)diet.  Discussed required intervals for follow up on HTN, lab studies.  Recommened pt. occasionally follow their blood pressures outside the clinic to ensure that BPs are remaining within guidelines, and to contact me if the readings are not within guidelines on a regular basis so we can adjust treatment as needed.   Plan: Comprehensive metabolic panel, CBC with         platelets            (Z86.59) History of depressive symptoms  Comment:   Plan:     (R09.81) Nasal congestion  Comment:   Plan:     (E66.01,  Z68.35) Severe obesity (BMI 35.0-35.9 with comorbidity) (H)  Comment: Obesity and hypertension and prediabetes.  Current BMI is: Body mass index is 37.34 kg/m ..  Reviewed weight patterns.   Obesity as a biological preventable and treatable disease, which is associated with significantly increased risk of many acute and chronic health conditions.   Obesity has now been recognized as a chronic disease by the American Medical Association.  Obesity has serious co-morbidities associated with obesity including increased risk for hypertension, stroke, coronary artery disease, dyslipidemia, Type II diabetes, depression, sleep apnea, cancers of the colon, breast and endometrium, obstructive sleep apnea, osteoarthritis and female infertility.  Recommended regular aerobic exercise, recommended " "improved diet aiming at lowering amount of processed foods, lower sugars, moderation/reduction of simple carbs and fat in the diet, establishing more regular meal times, always eating breakfast, front loading some of the calories and adding more protein to the diet.        Plan: Lipid panel reflex to direct LDL Fasting,         Comprehensive metabolic panel, Hemoglobin A1c,         CBC with platelets            (R73.03) Prediabetes  Comment: Reviewed the labs showing mildly elevated glucose levels consistent with prediabetes.     Discussed \"pre-diabetes\", impaired glucose tolerance, and its part in the dysmetabolic syndrome.   No medications needed at this time.     Discussed the inevitable progression of impaired glucose tolerance toward worsening diabetes mellitus if nothing is changed in the diet, and the need for agressive interventions now to delay and prevent this inevitable progression.    Recommended lower carb diet.  Recommended regular physical activity.   We will continue to monitor this and tobias additional recommendations and treatments as indicated based on the labs.       Plan: Lipid panel reflex to direct LDL Fasting,         Comprehensive metabolic panel, Hemoglobin A1c            (Z13.6) CARDIOVASCULAR SCREENING; LDL GOAL LESS THAN 130  Comment: Discussed cardiac disease risk factors and cardiac disease risk factor modification.   Plan: Lipid panel reflex to direct LDL Fasting,         Comprehensive metabolic panel, Hemoglobin A1c,         CBC with platelets                      BMI:   Estimated body mass index is 37.34 kg/m  as calculated from the following:    Height as of this encounter: 1.715 m (5' 7.5\").    Weight as of this encounter: 109.8 kg (242 lb).           Return in about 2 months (around 9/6/2022) for With your primary MD, in person Office visit, Annual physical, with pre-visit fasting labs.    Desmond Waldron MD  [facility]    Subjective   Heu " "is a 47 year old, presenting for the following health issues:  Diabetes and RECHECK      History of Present Illness       Diabetes:   He presents for follow up of diabetes.  He is not checking blood glucose. He is concerned about blood sugar frequently over 200.  He is having numbness in feet, redness, sores, or blisters on feet, excessive thirst, blurry vision and weight gain.         Hypertension: He presents for follow up of hypertension.  He does not check blood pressure  regularly outside of the clinic. Outside blood pressures have been over 140/90. He follows a low salt diet.     Headaches:   Since the patient's last clinic visit, headaches are: no change  The patient is getting headaches:  It comes and goes. When I get dizzy, i get headaches.  He is not able to do normal daily activities when he has a migraine.  The patient is taking the following rescue/relief medications:  Tylenol   Patient states \"I get only a small amount of relief\" from the rescue/relief medications.   The patient is taking the following medications to prevent migraines:  No medications to prevent migraines  In the past 4 weeks, the patient has gone to an Urgent Care or Emergency Room 2 times times due to headaches.    Vascular Disease:  He presents for follow up of vascular disease.  He never takes nitroglycerin. He is not taking daily aspirin.     Today's PHQ-9         PHQ-9 Total Score: 10    PHQ-9 Q9 Thoughts of better off dead/self-harm past 2 weeks :   Not at all    How difficult have these problems made it for you to do your work, take care of things at home, or get along with other people: Extremely difficult       2.   Hypertension:  History of hypertension, on medication.  No reported side effects from medications.    Reviewed last 6 BP readings in chart:  BP Readings from Last 6 Encounters:   07/06/22 132/80   06/30/22 (!) 179/105   06/30/22 (!) 155/99   10/06/21 130/83   09/27/21 (!) 144/88   09/23/21 138/78     No active " "cardiac complaints or symptoms with exercise.     3.  The patient has a history of impaired glucose tolerance with regularly elevated blood sugars.    They have not been diagnosed with type II DM or placed on medications for diabetes before.   They deny polyuria, polydipsia.     The patient is obese with a BMI of Body mass index is 37.34 kg/m ..    Review of current labs show:    Lab Results   Component Value Date    A1C 6.3 09/23/2021     @bgl@     4.    The patient has had a history of ongoing obesity.  Reviewed the weigth curves.   Their current BMI is:  Body mass index is 37.34 kg/m .  Reviewed previous attempts at weight loss which have not been successful in producing prolonged weigth loss.   Discussed current eating and exercise habits.     Reviewed weights in chart:  Wt Readings from Last 10 Encounters:   07/06/22 109.8 kg (242 lb)   06/30/22 112 kg (246 lb 14.4 oz)   06/30/22 110.3 kg (243 lb 3.2 oz)   11/30/21 102.1 kg (225 lb)   10/06/21 107 kg (236 lb)   09/27/21 107 kg (236 lb)   09/23/21 107 kg (236 lb)   03/02/20 104.3 kg (230 lb)   02/26/20 107 kg (236 lb)   08/08/19 101.6 kg (224 lb)            **I reviewed the information recorded in the patient's EPIC chart (including but not limited to medical history, surgical history, family history, problem list, medication list, and allergy list) and updated the information as indicated based on the patients reported information.         Review of Systems   Constitutional, HEENT, cardiovascular, pulmonary, gi and gu systems are negative, except as otherwise noted.      Objective    /80   Pulse 72   Temp 98.6  F (37  C)   Ht 1.715 m (5' 7.5\")   Wt 109.8 kg (242 lb)   SpO2 98%   BMI 37.34 kg/m    Body mass index is 37.34 kg/m .  Physical Exam     GENERAL alert and no distress  EYES:  Normal sclera,conjunctiva, EOMI  HENT: oral and posterior pharynx without lesions or erythema, facies symmetric  NECK: Neck supple. No LAD, without " thyroidmegaly.  RESP: Clear to ausculation bilaterally without wheezes or crackles. Normal BS in all fields.  CV: RRR normal S1S2 without murmurs, rubs or gallops.  LYMPH: no cervical lymph adenopathy appreciated  MS: extremities- no gross deformities of the visible extremities noted,   EXT:  no lower extremity edema  PSYCH: Alert and oriented times 3; speech- coherent  SKIN:  No obvious significant skin lesions on visible portions of face   NEURO:  CN II-XII intact and equal on both sides.    Normal and symmetrical strength in both upper and lower extremities.   Coordination with FNF intact and normal on both sodes.   Romberg test negative.   Gait moving around the office and in the hallway when leaving was normal.  Heel-toe tightrope tandem gait walk was mostly normal  Able to balance on each foot singly.   [place] Hallpike maneuvers were positive.                         .  ..

## 2022-08-08 ENCOUNTER — MYC MEDICAL ADVICE (OUTPATIENT)
Dept: FAMILY MEDICINE | Facility: CLINIC | Age: 47
End: 2022-08-08

## 2022-08-08 DIAGNOSIS — Z76.0 ENCOUNTER FOR MEDICATION REFILL: Primary | ICD-10-CM

## 2022-08-08 DIAGNOSIS — I10 BENIGN ESSENTIAL HYPERTENSION: ICD-10-CM

## 2022-08-08 RX ORDER — LOSARTAN POTASSIUM 25 MG/1
25 TABLET ORAL DAILY
Qty: 90 TABLET | Refills: 3 | Status: SHIPPED | OUTPATIENT
Start: 2022-08-08 | End: 2023-05-08

## 2022-09-18 ENCOUNTER — HEALTH MAINTENANCE LETTER (OUTPATIENT)
Age: 47
End: 2022-09-18

## 2022-10-14 ENCOUNTER — APPOINTMENT (OUTPATIENT)
Dept: RADIOLOGY | Facility: HOSPITAL | Age: 47
End: 2022-10-14
Attending: EMERGENCY MEDICINE
Payer: COMMERCIAL

## 2022-10-14 ENCOUNTER — MYC MEDICAL ADVICE (OUTPATIENT)
Dept: FAMILY MEDICINE | Facility: CLINIC | Age: 47
End: 2022-10-14

## 2022-10-14 ENCOUNTER — HOSPITAL ENCOUNTER (EMERGENCY)
Facility: HOSPITAL | Age: 47
Discharge: HOME | End: 2022-10-14
Attending: EMERGENCY MEDICINE | Admitting: EMERGENCY MEDICINE
Payer: COMMERCIAL

## 2022-10-14 VITALS
RESPIRATION RATE: 25 BRPM | OXYGEN SATURATION: 98 % | SYSTOLIC BLOOD PRESSURE: 136 MMHG | HEART RATE: 65 BPM | TEMPERATURE: 97.6 F | WEIGHT: 235 LBS | HEIGHT: 68 IN | BODY MASS INDEX: 35.61 KG/M2 | DIASTOLIC BLOOD PRESSURE: 68 MMHG

## 2022-10-14 DIAGNOSIS — R07.89 ATYPICAL CHEST PAIN: ICD-10-CM

## 2022-10-14 LAB
ANION GAP SERPL CALCULATED.3IONS-SCNC: 10 MMOL/L (ref 7–15)
APTT PPP: 29 SECONDS (ref 22–38)
BUN SERPL-MCNC: 14.6 MG/DL (ref 6–20)
CALCIUM SERPL-MCNC: 9.1 MG/DL (ref 8.6–10)
CHLORIDE SERPL-SCNC: 102 MMOL/L (ref 98–107)
CREAT SERPL-MCNC: 0.98 MG/DL (ref 0.67–1.17)
D DIMER PPP FEU-MCNC: 0.3 UG/ML FEU (ref 0–0.5)
DEPRECATED HCO3 PLAS-SCNC: 27 MMOL/L (ref 22–29)
ERYTHROCYTE [DISTWIDTH] IN BLOOD BY AUTOMATED COUNT: 12.5 % (ref 10–15)
GFR SERPL CREATININE-BSD FRML MDRD: >90 ML/MIN/1.73M2
GLUCOSE SERPL-MCNC: 173 MG/DL (ref 70–99)
HCT VFR BLD AUTO: 43.4 % (ref 40–53)
HGB BLD-MCNC: 14.8 G/DL (ref 13.3–17.7)
INR PPP: 0.95 (ref 0.85–1.15)
MCH RBC QN AUTO: 31.1 PG (ref 26.5–33)
MCHC RBC AUTO-ENTMCNC: 34.1 G/DL (ref 31.5–36.5)
MCV RBC AUTO: 91 FL (ref 78–100)
NT-PROBNP SERPL-MCNC: 6 PG/ML (ref 0–450)
PLATELET # BLD AUTO: 205 10E3/UL (ref 150–450)
POTASSIUM SERPL-SCNC: 4 MMOL/L (ref 3.4–5.3)
RBC # BLD AUTO: 4.76 10E6/UL (ref 4.4–5.9)
SODIUM SERPL-SCNC: 139 MMOL/L (ref 136–145)
TROPONIN T SERPL HS-MCNC: 10 NG/L
WBC # BLD AUTO: 7.1 10E3/UL (ref 4–11)

## 2022-10-14 PROCEDURE — 80048 BASIC METABOLIC PNL TOTAL CA: CPT | Performed by: EMERGENCY MEDICINE

## 2022-10-14 PROCEDURE — 71046 X-RAY EXAM CHEST 2 VIEWS: CPT

## 2022-10-14 PROCEDURE — 83880 ASSAY OF NATRIURETIC PEPTIDE: CPT | Performed by: EMERGENCY MEDICINE

## 2022-10-14 PROCEDURE — 99285 EMERGENCY DEPT VISIT HI MDM: CPT | Mod: 25

## 2022-10-14 PROCEDURE — 85730 THROMBOPLASTIN TIME PARTIAL: CPT | Performed by: EMERGENCY MEDICINE

## 2022-10-14 PROCEDURE — 85610 PROTHROMBIN TIME: CPT | Performed by: EMERGENCY MEDICINE

## 2022-10-14 PROCEDURE — 85027 COMPLETE CBC AUTOMATED: CPT | Performed by: EMERGENCY MEDICINE

## 2022-10-14 PROCEDURE — 85379 FIBRIN DEGRADATION QUANT: CPT | Performed by: EMERGENCY MEDICINE

## 2022-10-14 PROCEDURE — 93005 ELECTROCARDIOGRAM TRACING: CPT | Performed by: EMERGENCY MEDICINE

## 2022-10-14 PROCEDURE — 36415 COLL VENOUS BLD VENIPUNCTURE: CPT | Performed by: EMERGENCY MEDICINE

## 2022-10-14 PROCEDURE — 84484 ASSAY OF TROPONIN QUANT: CPT | Performed by: EMERGENCY MEDICINE

## 2022-10-14 ASSESSMENT — ACTIVITIES OF DAILY LIVING (ADL): ADLS_ACUITY_SCORE: 35

## 2022-10-14 NOTE — ED NOTES
Pt into rm 3. Ekg-sinus wilfrido in 57-60 range. Report left chest pain comes and goes. Presently the pain is 2/10. No sob. Sats 97% on ra.

## 2022-10-14 NOTE — ED TRIAGE NOTES
Patient reports intermittent chest pain that started yesterday, pt also develops sob and diaphoresis when the CP comes.

## 2022-10-14 NOTE — TELEPHONE ENCOUNTER
Contacted patient and asked patient to speak with RN at [facility]. Patient triaged by RN and advised to proceed to ED or UC immediately. Patient agreed.

## 2022-10-14 NOTE — ED NOTES
ED Provider In Triage Note  [facility]  Encounter Date: Oct 14, 2022    Chief Complaint   Patient presents with     Chest Pain       Brief HPI:   Dalila Malone is a 47 year old male, pre-DM, HTN and obesity, presenting to the Emergency Department with a chief complaint of chest pain that has been intermittent (more constant today) since yesterday. Pain initially was located to right side of chest, but now also on left side of chest. Reports associated SOB.    Brief Physical Exam:  There were no vitals taken for this visit.  General: Non-toxic appearing  HEENT: Atraumatic  Resp: No respiratory distress; clear lungs  Abdomen: Non-peritoneal  Neuro: Alert, oriented, answers questions appropriately  Psych: Behavior appropriate      Plan Initiated in Triage:  Orders Placed This Encounter     CBC (+ platelets, no diff)     Basic metabolic panel     Troponin T, High Sensitivity (now)     Nt probnp inpatient     INR     PTT     D-dimer    PIT Dispo:   Place patient in the next available ED bed    Anna Foster MD on 10/14/2022 at 11:07 AM    Patient was evaluated by the Physician in Triage due to a limitation of available rooms in the Emergency Department. A plan of care was discussed based on the information obtained on the initial evaluation and patient was consuled to return back to the Emergency Department lobby after this initial evalutaiton until results were obtained or a room became available in the Emergency Department. Patient was counseled not to leave prior to receiving the results of their workup.     Anna Foster MD  [facility] EMERGENCY DEPARTMENT  [address] [place] [id number]  [phone]     Anna Foster MD  10/14/22 7113

## 2022-10-14 NOTE — TELEPHONE ENCOUNTER
RN triaged pt.     Assessment:  - chest pain at an 8 (comes and go) (started yesterday)  - has SOB with chest pain.  - no dizziness but felt nauseous this morning  - when chest ain comes, it lasted a few minutes.   - have not passed out, but felt worse than yesterday   - pt sounded like he was trying to catch his breath, grunting.      RN advise pt go to ED or UC immediately and pt agrees.    Sadiq Jain Cem Say, BSN RN  [facility]

## 2022-10-17 LAB
ATRIAL RATE - MUSE: 65 BPM
DIASTOLIC BLOOD PRESSURE - MUSE: NORMAL MMHG
INTERPRETATION ECG - MUSE: NORMAL
P AXIS - MUSE: 39 DEGREES
PR INTERVAL - MUSE: 152 MS
QRS DURATION - MUSE: 98 MS
QT - MUSE: 370 MS
QTC - MUSE: 384 MS
R AXIS - MUSE: 51 DEGREES
SYSTOLIC BLOOD PRESSURE - MUSE: NORMAL MMHG
T AXIS - MUSE: -23 DEGREES
VENTRICULAR RATE- MUSE: 65 BPM

## 2023-01-23 ENCOUNTER — APPOINTMENT (OUTPATIENT)
Dept: CT IMAGING | Facility: HOSPITAL | Age: 48
End: 2023-01-23
Attending: EMERGENCY MEDICINE
Payer: COMMERCIAL

## 2023-01-23 ENCOUNTER — HOSPITAL ENCOUNTER (EMERGENCY)
Facility: HOSPITAL | Age: 48
Discharge: HOME | End: 2023-01-23
Attending: EMERGENCY MEDICINE | Admitting: EMERGENCY MEDICINE
Payer: COMMERCIAL

## 2023-01-23 VITALS
WEIGHT: 230 LBS | TEMPERATURE: 98.1 F | HEART RATE: 61 BPM | SYSTOLIC BLOOD PRESSURE: 145 MMHG | BODY MASS INDEX: 34.07 KG/M2 | OXYGEN SATURATION: 94 % | RESPIRATION RATE: 18 BRPM | HEIGHT: 69 IN | DIASTOLIC BLOOD PRESSURE: 75 MMHG

## 2023-01-23 DIAGNOSIS — R51.9 NONINTRACTABLE HEADACHE, UNSPECIFIED CHRONICITY PATTERN, UNSPECIFIED HEADACHE TYPE: ICD-10-CM

## 2023-01-23 DIAGNOSIS — R42 DIZZINESS: ICD-10-CM

## 2023-01-23 LAB
ANION GAP SERPL CALCULATED.3IONS-SCNC: 8 MMOL/L (ref 7–15)
BASOPHILS # BLD AUTO: 0 10E3/UL (ref 0–0.2)
BASOPHILS NFR BLD AUTO: 1 %
BUN SERPL-MCNC: 14.5 MG/DL (ref 6–20)
CALCIUM SERPL-MCNC: 9.3 MG/DL (ref 8.6–10)
CHLORIDE SERPL-SCNC: 101 MMOL/L (ref 98–107)
CREAT SERPL-MCNC: 1.15 MG/DL (ref 0.67–1.17)
DEPRECATED HCO3 PLAS-SCNC: 29 MMOL/L (ref 22–29)
EOSINOPHIL # BLD AUTO: 0.4 10E3/UL (ref 0–0.7)
EOSINOPHIL NFR BLD AUTO: 5 %
ERYTHROCYTE [DISTWIDTH] IN BLOOD BY AUTOMATED COUNT: 12.7 % (ref 10–15)
GFR SERPL CREATININE-BSD FRML MDRD: 79 ML/MIN/1.73M2
GLUCOSE SERPL-MCNC: 267 MG/DL (ref 70–99)
HCT VFR BLD AUTO: 44.1 % (ref 40–53)
HGB BLD-MCNC: 14.9 G/DL (ref 13.3–17.7)
IMM GRANULOCYTES # BLD: 0 10E3/UL
IMM GRANULOCYTES NFR BLD: 1 %
LYMPHOCYTES # BLD AUTO: 1.3 10E3/UL (ref 0.8–5.3)
LYMPHOCYTES NFR BLD AUTO: 20 %
MCH RBC QN AUTO: 30.5 PG (ref 26.5–33)
MCHC RBC AUTO-ENTMCNC: 33.8 G/DL (ref 31.5–36.5)
MCV RBC AUTO: 90 FL (ref 78–100)
MONOCYTES # BLD AUTO: 0.4 10E3/UL (ref 0–1.3)
MONOCYTES NFR BLD AUTO: 5 %
NEUTROPHILS # BLD AUTO: 4.6 10E3/UL (ref 1.6–8.3)
NEUTROPHILS NFR BLD AUTO: 68 %
NRBC # BLD AUTO: 0 10E3/UL
NRBC BLD AUTO-RTO: 0 /100
PLATELET # BLD AUTO: 226 10E3/UL (ref 150–450)
POTASSIUM SERPL-SCNC: 3.9 MMOL/L (ref 3.4–5.3)
RBC # BLD AUTO: 4.88 10E6/UL (ref 4.4–5.9)
SODIUM SERPL-SCNC: 138 MMOL/L (ref 136–145)
WBC # BLD AUTO: 6.7 10E3/UL (ref 4–11)

## 2023-01-23 PROCEDURE — 80048 BASIC METABOLIC PNL TOTAL CA: CPT | Performed by: EMERGENCY MEDICINE

## 2023-01-23 PROCEDURE — 70496 CT ANGIOGRAPHY HEAD: CPT

## 2023-01-23 PROCEDURE — 36415 COLL VENOUS BLD VENIPUNCTURE: CPT | Performed by: EMERGENCY MEDICINE

## 2023-01-23 PROCEDURE — 250N000011 HC RX IP 250 OP 636: Performed by: EMERGENCY MEDICINE

## 2023-01-23 PROCEDURE — 70498 CT ANGIOGRAPHY NECK: CPT

## 2023-01-23 PROCEDURE — 99285 EMERGENCY DEPT VISIT HI MDM: CPT | Mod: 25

## 2023-01-23 PROCEDURE — 250N000013 HC RX MED GY IP 250 OP 250 PS 637: Performed by: EMERGENCY MEDICINE

## 2023-01-23 PROCEDURE — 96375 TX/PRO/DX INJ NEW DRUG ADDON: CPT | Mod: 59

## 2023-01-23 PROCEDURE — 96374 THER/PROPH/DIAG INJ IV PUSH: CPT | Mod: 59

## 2023-01-23 PROCEDURE — 85025 COMPLETE CBC W/AUTO DIFF WBC: CPT | Performed by: EMERGENCY MEDICINE

## 2023-01-23 RX ORDER — MECLIZINE HCL 12.5 MG 12.5 MG/1
25 TABLET ORAL ONCE
Status: COMPLETED | OUTPATIENT
Start: 2023-01-23 | End: 2023-01-23

## 2023-01-23 RX ORDER — IOPAMIDOL 755 MG/ML
75 INJECTION, SOLUTION INTRAVASCULAR ONCE
Status: COMPLETED | OUTPATIENT
Start: 2023-01-23 | End: 2023-01-23

## 2023-01-23 RX ORDER — DIPHENHYDRAMINE HYDROCHLORIDE 50 MG/ML
25 INJECTION INTRAMUSCULAR; INTRAVENOUS ONCE
Status: COMPLETED | OUTPATIENT
Start: 2023-01-23 | End: 2023-01-23

## 2023-01-23 RX ORDER — MECLIZINE HCL 12.5 MG 12.5 MG/1
12.5 TABLET ORAL 4 TIMES DAILY PRN
Qty: 30 TABLET | Refills: 0 | Status: SHIPPED | OUTPATIENT
Start: 2023-01-23 | End: 2024-01-04

## 2023-01-23 RX ADMIN — DIPHENHYDRAMINE HYDROCHLORIDE 25 MG: 50 INJECTION, SOLUTION INTRAMUSCULAR; INTRAVENOUS at 13:14

## 2023-01-23 RX ADMIN — IOPAMIDOL 75 ML: 755 INJECTION, SOLUTION INTRAVENOUS at 14:16

## 2023-01-23 RX ADMIN — PROCHLORPERAZINE EDISYLATE 10 MG: 5 INJECTION INTRAMUSCULAR; INTRAVENOUS at 13:17

## 2023-01-23 RX ADMIN — MECLIZINE HCL 12.5 MG 25 MG: 12.5 TABLET ORAL at 13:13

## 2023-01-23 ASSESSMENT — ACTIVITIES OF DAILY LIVING (ADL)
ADLS_ACUITY_SCORE: 35
ADLS_ACUITY_SCORE: 35

## 2023-01-23 NOTE — ED NOTES
"ED Provider In Triage Note  [facility]  Encounter Date: Jan 23, 2023    Chief Complaint   Patient presents with     Headache     Dizziness       Brief HPI:   Dalila Malone is a 47 year old male presenting to the Emergency Department with a chief complaint of headache and vertigo since 2am. Woke up with severe headache and noticed room spinning as well. Has to hold onto things to walk. Took tylenol and excedrin with moderate improvement.    Brief Physical Exam:  BP (!) 161/92   Pulse 86   Resp 20   Ht 1.74 m (5' 8.5\")   Wt 104.3 kg (230 lb)   SpO2 94%   BMI 34.46 kg/m    General: Non-toxic appearing  HEENT: Atraumatic  Resp: No respiratory distress  Abdomen: Non-peritoneal  Neuro: Alert, oriented, answers questions appropriately  Psych: Behavior appropriate      Plan Initiated in Triage:  Orders Placed This Encounter     CTA Head Neck with Contrast     Basic metabolic panel     prochlorperazine (COMPAZINE) injection 10 mg     diphenhydrAMINE (BENADRYL) injection 25 mg     meclizine (ANTIVERT) tablet 25 mg       PIT Dispo:   Return to lobby while awaiting workup and ED bed availability    Shakira Alfaro MD on 1/23/2023 at 12:23 PM    Patient was evaluated by the Physician in Triage due to a limitation of available rooms in the Emergency Department. A plan of care was discussed based on the information obtained on the initial evaluation and patient was consuled to return back to the Emergency Department lobby after this initial evalutaiton until results were obtained or a room became available in the Emergency Department. Patient was counseled not to leave prior to receiving the results of their workup.     Shakira Alfaro MD  [facility] EMERGENCY DEPARTMENT  [address] [place] [id number]  [phone]     Shakira Alfaro MD  01/23/23 1224    "

## 2023-01-23 NOTE — ED TRIAGE NOTES
Patient here for headache that woke him up at 0200, tried to get up to bathroom. Nauseated, continues to be dizzy,no recent head injury, did take OTC with relief of headache

## 2023-01-23 NOTE — ED PROVIDER NOTES
"Emergency Department Midlevel Supervisory Note     I personally saw the patient and performed a substantive portion of the visit including all aspects of the medical decision making.    ED Course:  5:41 PM  Gwen Valdes PA-C staffed patient with me. I agree with their assessment and plan of management, and I will see the patient.  7:55 PM  I met with the patient to introduce myself, gather additional history, perform my initial exam, and discuss the plan.     Brief HPI:     Dalila Malone is a 47 year old male who presents for evaluation of headache and dizziness. Headache is located at bilateral temples and the back of his head. He notes he feel like the room is spinning. Took Excedrin and Tylenol, with no relief. He notes pain is 4/10 currently, and vertigo and nausea is still present.   Patient did have an episode of vertigo in his remote past which was treated with meclizine which she did report really helped his symptoms he reports its much more severe this time.  He is having some difficulty with walking.    I, Kaye Vital, am serving as a scribe to document services personally performed by Dallas Bean MD, based on my observations and the provider's statements to me.   I, Dallas Bean MD attest that Kaye Vital was acting in a scribe capacity, has observed my performance of the services and has documented them in accordance with my direction.    Brief Physical Exam: BP (!) 145/75   Pulse 61   Temp 99  F (37.2  C) (Temporal)   Resp 18   Ht 1.74 m (5' 8.5\")   Wt 104.3 kg (230 lb)   SpO2 94%   BMI 34.46 kg/m    Constitutional:  Alert, in no acute distress  EYES: Conjunctivae clear  HENT:  Atraumatic, normocephalic  Respiratory:  Respirations even, unlabored, in no acute respiratory distress  Cardiovascular:  Regular rate and rhythm, good peripheral perfusion  GI: Soft, nondistended, nontender, no palpable masses, no rebound, no guarding   Musculoskeletal:  No edema. No cyanosis. Range of motion " major extremities intact.    Integument: Warm, Dry, No erythema, No rash.   Neurologic:  Alert & oriented, no cranial nerve deficits were appreciated no nystagmus was identified on clinical examination he had excellent strength both upper and lower with normal sensation throughout.  Did have difficulty with walking due to ataxia and unsteadiness.  Psych: Normal mood and affect     MDM:  Medical Decision Making    History:    Supplemental history from: Documented in chart, if applicable    External Record(s) reviewed: Documented in chart, if applicable.    Work Up:    Chart documentation includes differential considered and any EKGs or imaging independently interpreted by provider, where specified.    In additional to work up documented, I considered the following work up: Documented in chart, if applicable.    External consultation:    Discussion of management with another provider: Documented in chart, if applicable    Complicating factors:    Care impacted by chronic illness: N/A    Care affected by social determinants of health: N/A    Disposition considerations: Admission was recommended for least consideration for admission post MRI patient is declining further treatment and requesting discharge and will be discharged AGAINST MEDICAL ADVICE    7:59 PM  Patient presented emergency department for evaluation of headache and dizziness.  Initial work-up was negative but patient remained significantly vertiginous and was having difficulty with ambulation demonstrating some findings of central ataxia.  Concern for central mediated process.  Patient was outside the timeframe for lytic consideration CTA did not demonstrate any large vessel occlusion.  Given the severity of his symptoms and concern for central mediated process MRI was recommended.  Unfortunately the patient developed complex related to social situations family obligations and ultimately decided to not proceed with the MRI declining it and requesting  discharge from the emergency department.  I spent time discussing with the patient the indications for MRI imaging but will be looking for what would be hoping to exclude and the potential risks of an undiagnosed stroke with discharge.  Patient has demonstrated medical capacity and per his request we are proceeding with discharge.  I did encourage the patient to come back to the emergency department tomorrow morning if his symptoms are persistent to then proceed with the MRI.  We will discharge him on meclizine as it has had some improvement relative to his symptoms in the past when he has had episodes.  Patient will be discharged AGAINST MEDICAL ADVICE.  Reviewed return precautions with the patient treatment plan and follow-up prior to discharge.  Please see LUTHER note for further details on ED course and disposition.       1. Vertigo        Labs and Imaging:  Results for orders placed or performed during the hospital encounter of 01/23/23   CTA Head Neck with Contrast    Impression    IMPRESSION:   HEAD CT:  1.  No CT findings of acute intracranial process.    HEAD CTA:   1.  Unremarkable CTA [place].    NECK CTA:  1.  Unremarkable neck CTA.   Basic metabolic panel   Result Value Ref Range    Sodium 138 136 - 145 mmol/L    Potassium 3.9 3.4 - 5.3 mmol/L    Chloride 101 98 - 107 mmol/L    Carbon Dioxide (CO2) 29 22 - 29 mmol/L    Anion Gap 8 7 - 15 mmol/L    Urea Nitrogen 14.5 6.0 - 20.0 mg/dL    Creatinine 1.15 0.67 - 1.17 mg/dL    Calcium 9.3 8.6 - 10.0 mg/dL    Glucose 267 (H) 70 - 99 mg/dL    GFR Estimate 79 >60 mL/min/1.73m2   CBC with platelets and differential   Result Value Ref Range    WBC Count 6.7 4.0 - 11.0 10e3/uL    RBC Count 4.88 4.40 - 5.90 10e6/uL    Hemoglobin 14.9 13.3 - 17.7 g/dL    Hematocrit 44.1 40.0 - 53.0 %    MCV 90 78 - 100 fL    MCH 30.5 26.5 - 33.0 pg    MCHC 33.8 31.5 - 36.5 g/dL    RDW 12.7 10.0 - 15.0 %    Platelet Count 226 150 - 450 10e3/uL    % Neutrophils 68 %    %  Lymphocytes 20 %    % Monocytes 5 %    % Eosinophils 5 %    % Basophils 1 %    % Immature Granulocytes 1 %    NRBCs per 100 WBC 0 <1 /100    Absolute Neutrophils 4.6 1.6 - 8.3 10e3/uL    Absolute Lymphocytes 1.3 0.8 - 5.3 10e3/uL    Absolute Monocytes 0.4 0.0 - 1.3 10e3/uL    Absolute Eosinophils 0.4 0.0 - 0.7 10e3/uL    Absolute Basophils 0.0 0.0 - 0.2 10e3/uL    Absolute Immature Granulocytes 0.0 <=0.4 10e3/uL    Absolute NRBCs 0.0 10e3/uL     I have reviewed the relevant laboratory and radiology studies    Procedures:  I was present for the key portions of this procedure: none    Dallas Bean MD  [facility] EMERGENCY DEPARTMENT  [address] [place] [id number]  [phone]     Dallas Bean MD  01/23/23 2001

## 2023-01-23 NOTE — ED NOTES
Pt having improved headache but still dizzy described as spinning and some nausea but drinking water and starting to eat chocolate in the WR

## 2023-01-23 NOTE — ED PROVIDER NOTES
[facility] DEPARTMENT ENCOUNTER      NAME: Dalila Malone  AGE: 47 year old male  YOB: 1975  MRN: [id number]  EVALUATION DATE & TIME: 1/23/2023  4:17 PM    PCP: Elina Banks    ED PROVIDER: Gwen Valdes PA-C      Chief Complaint   Patient presents with     Headache     Dizziness         FINAL IMPRESSION:  1. Dizziness    2. Nonintractable headache, unspecified chronicity pattern, unspecified headache type          MEDICAL DECISION MAKING:    Pertinent Labs & Imaging studies reviewed. (See chart for details)  47 year old male presents to the Emergency Department for evaluation of headache and dizziness.  The patient this morning around 2 AM with significant headache that is bilateral temples that radiated back into his neck.  He went to get up for the restroom shortly after this and found that he was significantly dizzy and off balance.  He went back to bed after taking Excedrin and woke up with the same symptoms.  He was significantly concerned about his symptoms and presented to the emergency department.  On my evaluation, the patient was hypertensive at 161/92 and otherwise vitally stable.  Examination with 5 out of 5 strength and sensation in bilateral upper and lower extremities.  No nystagmus.  Pupils equal round and reactive to light.  Extraocular movements intact.  His gait was significantly unstable and he was not able to walk without supporting himself on a wall or some other object in the room.  He did have central ataxia.  Differential diagnosis included, but is not limited to, migraine, vertigo, electrolyte derangement, meningitis, stroke.    Work-up was initiated prior to me evaluating the patient included a CBC, BMP and CTA of the head and neck.  CBC without any derangements.  BMP with elevated glucose of 267 but no other significant derangements.  CT of the head and neck without any acute intracranial process, CTA of the [place] was unremarkable and CT of the neck was  unremarkable.  Patient was given Benadryl, Compazine and meclizine with improvement of his headache however, he still felt incredibly dizzy, with room spinning dizziness.  Patient does have a history of vertigo however he stated that this was improved with meclizine and he was given this today without any significant improvement.  Because of his central ataxia, continued dizziness without nystagmus on exam I was concerned about stroke and discussed MRI/MRA of the head and patient was in agreement and understanding.  Orders were placed after my initial evaluation of the patient.  Around 7:38 PM the patient's nurse approached me and stated that the patient wanted to leave AMA prior to the MRI.  He did not have childcare and needed to get home and he was requesting meclizine and stated that he would return if he continued to have symptoms after getting some rest tonight.  I discussed risks of this including missing a stroke as this would change our plan of care significantly.  Patient understood this and has the capacity to make his own medical decisions.  He filled out AMA paperwork.  I will discharge him home with a prescription for meclizine and discussed strict return precautions and follow-up with primary care.  Patient was in agreement and understanding and all questions were answered to the best my ability.  Patient left the emergency department AMA in stable condition.    Medical Decision Making    History:    Supplemental history from: Family Member/Significant Other    External Record(s) reviewed: Documented in chart, if applicable.    Work Up:    Chart documentation includes differential considered and any EKGs or imaging independently interpreted by provider, where specified.    In additional to work up documented, I considered the following work up: Documented in chart, if applicable.    External consultation:    Discussion of management with another provider: Documented in chart, if  applicable    Complicating factors:    Care impacted by chronic illness: Hypertension    Care affected by social determinants of health: N/A    Disposition considerations: Discharge. I prescribed additional prescription strength medication(s) as charted. Admission consideration documented above, if applicable.    ED COURSE:  4:56 PM I met with the patient, obtained history, performed an initial exam, and discussed options and plan for diagnostics and treatment here in the ED.    5:40 PM I staffed the patient with Dr. Bean.     7:57 PM Patient discharged AMA after being provided with extensive anticipatory guidance and given return precautions, importance of PCP follow-up emphasized.    At the conclusion of the encounter I discussed the results of all of the tests and the disposition. The questions were answered. The patient or family acknowledged understanding and was agreeable with the care plan.     MEDICATIONS GIVEN IN THE EMERGENCY:  Medications   prochlorperazine (COMPAZINE) injection 10 mg (10 mg Intravenous Given 1/23/23 1317)   diphenhydrAMINE (BENADRYL) injection 25 mg (25 mg Intravenous Given 1/23/23 1314)   meclizine (ANTIVERT) tablet 25 mg (25 mg Oral Given 1/23/23 1313)   iopamidol (ISOVUE-370) solution 75 mL (75 mLs Intravenous Given 1/23/23 1416)       NEW PRESCRIPTIONS STARTED AT TODAY'S ER VISIT  Discharge Medication List as of 1/23/2023  8:18 PM               =================================================================    HPI:    Patient information was obtained from: patient, patient's wife    Use of Interpretor: N/A         Dalila HARDING Faisal is a 47 year old male with a pertinent history of obesity, HTN, hypertriglyceridemia,  who presents to this ED by private vehicle with wife for evaluation of headache and dizziness. Patient reports that he had the onset of a headache at 0200, which was located at his bilateral temples, and the back of his head. He states that he walked to the bathroom at  "0200, and felt \"like the room was spinning.\" Patient reports that he had to support himself on the bed and walls to walk to the bathroom. He states that he took Excedrin at 0230, with no relief, and tried cupping at 0230, also without relief. He states that he went back to sleep, but woke up with the same headache and vertigo. Patient states that he took 1 tylenol for the headache at 0700. He states that his headache is a 4/10 now, and his vertigo is still there. Patient also endorses nausea.     Patient denies emesis, fever, chills, chest pain, shortness of breath, abdominal pain, diarrhea, bloody stools, melena, blurred vision, double vision, numbness, tingling, weakness, or additional medical concerns or complaints at this time.  Of note, patient states that this feels similar to his previous vertigo, but that his previous vertigo did not have an associated headache.     Per chart review, patient presented to the [facility] ED on 6/30/22 (~6 months ago) for evaluation of vertigo. Patient had no focal neurologic deficits, but some nystagmus noted. MRI did not show abnormalities. The provider decided that this was peripheral vertigo. Labs were unremarkable, EKG was normal. Patient was given meclizine for symptomatic treatment and discharged with return precautions.       REVIEW OF SYSTEMS:  Negative unless otherwise stated in the above HPI.      PAST MEDICAL HISTORY:  Past Medical History:   Diagnosis Date     Medical history non-contributory        PAST SURGICAL HISTORY:  Past Surgical History:   Procedure Laterality Date     NO PAST SURGERIES             CURRENT MEDICATIONS:    No current facility-administered medications for this encounter.    Current Outpatient Medications:      meclizine (ANTIVERT) 12.5 MG tablet, Take 1 tablet (12.5 mg) by mouth 4 times daily as needed for dizziness, Disp: 30 tablet, Rfl: 0     acetaminophen (TYLENOL) 500 MG tablet, Take 1,000 mg by mouth, Disp: , Rfl:      albuterol " (PROVENTIL HFA) 108 (90 Base) MCG/ACT inhaler, Inhale 2 puffs into the lungs every 4 hours as needed for shortness of breath / dyspnea or wheezing, Disp: 18 g, Rfl: 1     aspirin-acetaminophen-caffeine (EXCEDRIN MIGRAINE) 250-250-65 MG tablet, Take 2 tablets by mouth, Disp: , Rfl:      cyclobenzaprine (FLEXERIL) 5 MG tablet, Take 1 tablet (5 mg) by mouth nightly as needed for muscle spasms, Disp: 30 tablet, Rfl: 1     fluticasone (FLONASE) 50 MCG/ACT nasal spray, Spray 2 sprays into both nostrils daily, Disp: , Rfl:      losartan (COZAAR) 25 MG tablet, Take 1 tablet (25 mg) by mouth daily, Disp: 90 tablet, Rfl: 3     montelukast (SINGULAIR) 10 MG tablet, TAKE 1 TABLET(10 MG) BY MOUTH AT BEDTIME, Disp: 30 tablet, Rfl: 11     sertraline (ZOLOFT) 50 MG tablet, One half tablet daily for one week, then one full tablet daily, Disp: 30 tablet, Rfl: 1      ALLERGIES:  No Known Allergies    FAMILY HISTORY:  Family History   Problem Relation Age of Onset     Diabetes Mother      Cerebrovascular Disease Mother      Hypertension Mother      Cerebrovascular Disease Father      Hypertension Father      Diabetes Father        SOCIAL HISTORY:   Social History     Socioeconomic History     Marital status:    Tobacco Use     Smoking status: Former     Packs/day: 0.25     Types: Cigarettes     Smokeless tobacco: Never     Tobacco comments:     smokes 6-8 per day   Substance and Sexual Activity     Alcohol use: Yes     Comment: Alcoholic Drinks/day: occasional     Drug use: Never       VITALS:  Patient Vitals for the past 24 hrs:   BP Temp Temp src Pulse Resp SpO2 Height Weight   01/23/23 1945 (!) 145/75 98.1  F (36.7  C) -- 61 -- 94 % -- --   01/23/23 1930 (!) 155/90 -- -- 67 -- 93 % -- --   01/23/23 1915 (!) 166/104 -- -- 64 -- 94 % -- --   01/23/23 1900 (!) 149/87 -- -- 64 18 96 % -- --   01/23/23 1845 (!) 138/90 -- -- 60 18 95 % -- --   01/23/23 1830 -- -- -- 61 18 96 % -- --   01/23/23 1745 139/89 -- -- 56 18 96 % -- --  "  01/23/23 1730 (!) 143/88 -- -- 59 18 95 % -- --   01/23/23 1700 (!) 149/90 -- -- 66 18 95 % -- --   01/23/23 1645 (!) 141/85 -- -- 59 18 96 % -- --   01/23/23 1630 (!) 144/82 -- -- 68 18 94 % -- --   01/23/23 1617 (!) 143/93 99  F (37.2  C) Temporal 72 18 93 % -- --   01/23/23 1224 -- 98.3  F (36.8  C) -- -- -- -- -- --   01/23/23 1223 (!) 161/92 -- -- 86 20 94 % 1.74 m (5' 8.5\") 104.3 kg (230 lb)       PHYSICAL EXAM    Constitutional: Well developed, Well nourished, NAD  HENT: Normocephalic, Atraumatic, Bilateral external ears normal, Oropharynx normal, mucous membranes moist, Nose normal.   Neck: Normal range of motion, No tenderness, Supple, No stridor.  Eyes: PERRL, EOMI, no nystagmus, conjunctiva normal, No discharge.   Respiratory: Normal breath sounds, No respiratory distress, No wheezing, Speaks full sentences easily. No cough.  Cardiovascular: Normal heart rate, Regular rhythm, No murmurs, No rubs, No gallops.  Musculoskeletal: 2+ DP pulses. No edema. No cyanosis, No clubbing. Good range of motion in all major joints. No tenderness to palpation or major deformities noted. No tenderness of the CTLS spine.   Integument: Warm, Dry, No erythema, No rash. No petechiae.  Neurologic: 5 out of 5 strength and sensation in bilateral upper and lower extremities.  Alert & oriented x 3, Normal motor function, Normal sensory function, No focal deficits noted.  Unstable gait.  Central ataxia.  Psychiatric: Affect normal, Judgment normal, Mood normal. Cooperative.    LAB:  All pertinent labs reviewed and interpreted.  Recent Results (from the past 24 hour(s))   Basic metabolic panel    Collection Time: 01/23/23  1:11 PM   Result Value Ref Range    Sodium 138 136 - 145 mmol/L    Potassium 3.9 3.4 - 5.3 mmol/L    Chloride 101 98 - 107 mmol/L    Carbon Dioxide (CO2) 29 22 - 29 mmol/L    Anion Gap 8 7 - 15 mmol/L    Urea Nitrogen 14.5 6.0 - 20.0 mg/dL    Creatinine 1.15 0.67 - 1.17 mg/dL    Calcium 9.3 8.6 - 10.0 mg/dL    " Glucose 267 (H) 70 - 99 mg/dL    GFR Estimate 79 >60 mL/min/1.73m2   CBC with platelets and differential    Collection Time: 01/23/23  1:11 PM   Result Value Ref Range    WBC Count 6.7 4.0 - 11.0 10e3/uL    RBC Count 4.88 4.40 - 5.90 10e6/uL    Hemoglobin 14.9 13.3 - 17.7 g/dL    Hematocrit 44.1 40.0 - 53.0 %    MCV 90 78 - 100 fL    MCH 30.5 26.5 - 33.0 pg    MCHC 33.8 31.5 - 36.5 g/dL    RDW 12.7 10.0 - 15.0 %    Platelet Count 226 150 - 450 10e3/uL    % Neutrophils 68 %    % Lymphocytes 20 %    % Monocytes 5 %    % Eosinophils 5 %    % Basophils 1 %    % Immature Granulocytes 1 %    NRBCs per 100 WBC 0 <1 /100    Absolute Neutrophils 4.6 1.6 - 8.3 10e3/uL    Absolute Lymphocytes 1.3 0.8 - 5.3 10e3/uL    Absolute Monocytes 0.4 0.0 - 1.3 10e3/uL    Absolute Eosinophils 0.4 0.0 - 0.7 10e3/uL    Absolute Basophils 0.0 0.0 - 0.2 10e3/uL    Absolute Immature Granulocytes 0.0 <=0.4 10e3/uL    Absolute NRBCs 0.0 10e3/uL         RADIOLOGY:  Reviewed all pertinent imaging. Please see official radiology report.  CTA Head Neck with Contrast   Final Result   IMPRESSION:    HEAD CT:   1.  No CT findings of acute intracranial process.      HEAD CTA:    1.  Unremarkable CTA [place].      NECK CTA:   1.  Unremarkable neck CTA.          PROCEDURES:   None      I, Elise Gryler, am serving as a scribe to document services personally performed by Gwen Valdes PA-C based on my observation and the provider's statements to me. I, Gwen Valdes PA-C attest that Elise Gryler is acting in a scribe capacity, has observed my performance of the services and has documented them in accordance with my direction.    Gwen Valdes PA-C  Emergency Medicine  [facility]  1/23/2023       Gwen Valdes PA-C  01/23/23 9795

## 2023-01-24 NOTE — DISCHARGE INSTRUCTIONS
You are seen and evaluated here in the emergency department for your headache and dizziness.  Fortunately, labs and CTA of head and neck were negative however, you continue to feel dizzy and I did recommend MRI.  Unfortunately, you did not have childcare for your kids and needed to leave the emergency department.  We discussed risks of this as we cannot evaluate for stroke as a cause of your symptoms and if MRI was positive for this this would completely change our plan of care.  We discussed this in your and understanding of the risks of this and want to go home to try outpatient management with meclizine.    You are leaving the emergency department AMA.    If you continue to have symptoms of dizziness, your headache becomes severe or you have any other concerning symptoms you should return here.

## 2023-01-29 ENCOUNTER — HEALTH MAINTENANCE LETTER (OUTPATIENT)
Age: 48
End: 2023-01-29

## 2023-01-31 ENCOUNTER — IMMUNIZATION (OUTPATIENT)
Dept: FAMILY MEDICINE | Facility: CLINIC | Age: 48
End: 2023-01-31
Payer: COMMERCIAL

## 2023-01-31 DIAGNOSIS — Z23 HIGH PRIORITY FOR 2019-NCOV VACCINE: Primary | ICD-10-CM

## 2023-01-31 PROCEDURE — 99207 PR NO CHARGE NURSE ONLY: CPT

## 2023-01-31 PROCEDURE — 0124A COVID-19 VACCINE BIVALENT BOOSTER 12+ (PFIZER): CPT

## 2023-01-31 PROCEDURE — 91312 COVID-19 VACCINE BIVALENT BOOSTER 12+ (PFIZER): CPT

## 2023-02-20 ENCOUNTER — OFFICE VISIT (OUTPATIENT)
Dept: ORTHOPEDICS | Facility: CLINIC | Age: 48
End: 2023-02-20
Payer: COMMERCIAL

## 2023-02-20 VITALS
SYSTOLIC BLOOD PRESSURE: 152 MMHG | DIASTOLIC BLOOD PRESSURE: 93 MMHG | BODY MASS INDEX: 34.07 KG/M2 | HEIGHT: 69 IN | WEIGHT: 230 LBS

## 2023-02-20 DIAGNOSIS — M17.0 ARTHRITIS OF BOTH KNEES: ICD-10-CM

## 2023-02-20 DIAGNOSIS — M22.2X2 PATELLOFEMORAL PAIN SYNDROME OF LEFT KNEE: ICD-10-CM

## 2023-02-20 DIAGNOSIS — M25.562 CHRONIC PAIN OF BOTH KNEES: Primary | ICD-10-CM

## 2023-02-20 DIAGNOSIS — G89.29 CHRONIC PAIN OF BOTH KNEES: Primary | ICD-10-CM

## 2023-02-20 DIAGNOSIS — M25.561 CHRONIC PAIN OF BOTH KNEES: Primary | ICD-10-CM

## 2023-02-20 PROCEDURE — 99213 OFFICE O/P EST LOW 20 MIN: CPT | Performed by: PEDIATRICS

## 2023-02-20 NOTE — PATIENT INSTRUCTIONS
We discussed these other possible diagnosis: Left knee more likely patellofemoral pain, possible mild arthritis as well.    Discussed nature of degenerative arthrosis of the knee. Discussed symptom treatment with over-the-counter medications, ice or heat, topical treatments, and rest if needed. Discussed use of sleeve or wrap for comfort. Discussed benefits of exercise and physical therapy. Discussed injection therapy. Also briefly discussed future consideration of referral to orthopedic surgery for further evaluation and discussion of arthroplasty.    Plan:  - Today's Plan of Care:  Home Exercise Program    Continue with relative rest and activity modification, Ice, Compression, and Elevation.  Can apply ice 10-15 minutes 3-4 times per day as needed. OTC medications as needed.  Voltaren/Diclofenac Gel.    Trial of knee brace - J brace    -We also discussed other future treatment options:  Referral to Physical Therapy  Consideration of corticosteroid injection  MRI - if more swelling, significant locking    Follow Up: as needed    If you have any further questions for your physician or physician s care team you can call [phone] and use option 3 to leave a voice message.

## 2023-02-20 NOTE — LETTER
2/20/2023         RE: Dalila Malone  [address]  [place] [address]        Dear Colleague,    Thank you for referring your patient, Dalila Malone, to the [facility] [place]. Please see a copy of my visit note below.    ASSESSMENT & PLAN    Dalila was seen today for pain.    Diagnoses and all orders for this visit:    Chronic pain of both knees    Patellofemoral pain syndrome of left knee    Arthritis of both knees      This issue is chronic and Unchanged.    We discussed these other possible diagnosis: Left knee more likely patellofemoral pain, possible mild arthritis as well.    Discussed nature of degenerative arthrosis of the knee. Discussed symptom treatment with over-the-counter medications, ice or heat, topical treatments, and rest if needed. Discussed use of sleeve or wrap for comfort. Discussed benefits of exercise and physical therapy. Discussed injection therapy. Also briefly discussed future consideration of referral to orthopedic surgery for further evaluation and discussion of arthroplasty.    Plan:  - Today's Plan of Care:  Home Exercise Program    Continue with relative rest and activity modification, Ice, Compression, and Elevation.  Can apply ice 10-15 minutes 3-4 times per day as needed. OTC medications as needed.  Voltaren/Diclofenac Gel.    Trial of knee brace - J brace    -We also discussed other future treatment options:  Referral to Physical Therapy  Consideration of corticosteroid injection  MRI - if more swelling, significant locking    Follow Up: as needed    Concerning signs and symptoms were reviewed.  The patient expressed understanding of this management plan and all questions were answered at this time.    Gwen Rodriguez MD [facility]  Sports Medicine Physician  [facility]    -----  Chief Complaint   Patient presents with     Left Knee - Pain       SUBJECTIVE  Dalila Malone is a/an 48 year old male who is seen as a self referral for evaluation of  "left knee.     The patient is seen by themselves.    Onset: 3 week(s) ago. Reports insidious onset without acute precipitating event.  Location of Pain: left anterior knee around patella  Worsened by: knee flexion, walking, standing  Better with: rest / activity avoidance  Treatments tried: rest/activity avoidance, ice, heat, ibuprofen and topical analgesics, heated patches, xray 9/27/21  Associated symptoms: locking or catching and feeling of instability    Orthopedic/Surgical history: YES - patient has h/o chronic bilateral knee pain, was evaluated by Dr. 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    REVIEW OF SYSTEMS:  Review of Systems  Skin: no bruising, mild swelling  Musculoskeletal: as above  Neurologic: no numbness, paresthesias  Remainder of review of systems is negative including constitutional, CV, pulmonary, GI, except as noted in HPI or medical history.    OBJECTIVE:  BP (!) 152/93   Ht 1.74 m (5' 8.5\")   Wt 104.3 kg (230 lb)   BMI 34.46 kg/m     General: healthy, alert and in no distress  HEENT: no scleral icterus or conjunctival erythema  Skin: no suspicious lesions or rash. No jaundice.  CV: distal perfusion intact  Resp: normal respiratory effort without conversational dyspnea   Psych: normal mood and affect  Gait: NORMAL  Neuro: Normal light sensory exam of lower extremity    Bilateral Knee exam    Inspection:      mild effusion left    Patella:      Crepitus noted in the patellofemoral joint bilateral    Tender:      Most tenderness around left knee cap    Non Tender:      remainder of knee area bilateral    Knee ROM:      Range of motion limited in full flexion and extension left    Hip ROM:     Full active and passive ROM bilateral    Strength:      5-/5 with knee extension left    Special Tests:     neg (-) Chapo left       neg (-) anterior drawer left       neg (-) " posterior drawer left       neg (-) varus at 0 deg and 30 deg left       neg (-) valgus at 0 deg and 30 deg left    Gait:      normal    Neurovascular:      2+ peripheral pulses bilaterally and brisk capillary refill       sensation grossly intact    RADIOLOGY:  I independently ordered, visualized and reviewed these images with the patient  3 XR views of bilateral knees reviewed 9/27/2021: no acute bony abnormality, mild - moderate degenerative changes right knee, left PF degenerative changes milder  - will follow official read      Review of the result(s) of each unique test - XR  Independent interpretation of a test performed by another physician/other qualified health care professional (not separately reported) - XR               Again, thank you for allowing me to participate in the care of your patient.        Sincerely,        Gwen Rodriguez MD

## 2023-02-20 NOTE — PROGRESS NOTES
ASSESSMENT & PLAN    Dalila was seen today for pain.    Diagnoses and all orders for this visit:    Chronic pain of both knees    Patellofemoral pain syndrome of left knee    Arthritis of both knees      This issue is chronic and Unchanged.    We discussed these other possible diagnosis: Left knee more likely patellofemoral pain, possible mild arthritis as well.    Discussed nature of degenerative arthrosis of the knee. Discussed symptom treatment with over-the-counter medications, ice or heat, topical treatments, and rest if needed. Discussed use of sleeve or wrap for comfort. Discussed benefits of exercise and physical therapy. Discussed injection therapy. Also briefly discussed future consideration of referral to orthopedic surgery for further evaluation and discussion of arthroplasty.    Plan:  - Today's Plan of Care:  Home Exercise Program    Continue with relative rest and activity modification, Ice, Compression, and Elevation.  Can apply ice 10-15 minutes 3-4 times per day as needed. OTC medications as needed.  Voltaren/Diclofenac Gel.    Trial of knee brace - J brace    -We also discussed other future treatment options:  Referral to Physical Therapy  Consideration of corticosteroid injection  MRI - if more swelling, significant locking    Follow Up: as needed    Concerning signs and symptoms were reviewed.  The patient expressed understanding of this management plan and all questions were answered at this time.    Gwen Rodriguez MD [facility]  Sports Medicine Physician  [facility]    -----  Chief Complaint   Patient presents with     Left Knee - Pain       SUBJECTIVE  Heu MEHDI Malone is a/an 48 year old male who is seen as a self referral for evaluation of left knee.     The patient is seen by themselves.    Onset: 3 week(s) ago. Reports insidious onset without acute precipitating event.  Location of Pain: left anterior knee around patella  Worsened by: knee flexion, walking, standing  Better with: rest  "/ activity avoidance  Treatments tried: rest/activity avoidance, ice, heat, ibuprofen and topical analgesics, heated patches, xray 9/27/21  Associated symptoms: locking or catching and feeling of instability    Orthopedic/Surgical history: YES - patient has h/o chronic bilateral knee pain, was evaluated by Dr. 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    REVIEW OF SYSTEMS:  Review of Systems  Skin: no bruising, mild swelling  Musculoskeletal: as above  Neurologic: no numbness, paresthesias  Remainder of review of systems is negative including constitutional, CV, pulmonary, GI, except as noted in HPI or medical history.    OBJECTIVE:  BP (!) 152/93   Ht 1.74 m (5' 8.5\")   Wt 104.3 kg (230 lb)   BMI 34.46 kg/m     General: healthy, alert and in no distress  HEENT: no scleral icterus or conjunctival erythema  Skin: no suspicious lesions or rash. No jaundice.  CV: distal perfusion intact  Resp: normal respiratory effort without conversational dyspnea   Psych: normal mood and affect  Gait: NORMAL  Neuro: Normal light sensory exam of lower extremity    Bilateral Knee exam    Inspection:      mild effusion left    Patella:      Crepitus noted in the patellofemoral joint bilateral    Tender:      Most tenderness around left knee cap    Non Tender:      remainder of knee area bilateral    Knee ROM:      Range of motion limited in full flexion and extension left    Hip ROM:     Full active and passive ROM bilateral    Strength:      5-/5 with knee extension left    Special Tests:     neg (-) Chapo left       neg (-) anterior drawer left       neg (-) posterior drawer left       neg (-) varus at 0 deg and 30 deg left       neg (-) valgus at 0 deg and 30 deg left    Gait:      normal    Neurovascular:      2+ peripheral pulses bilaterally and brisk capillary refill       sensation grossly " intact    RADIOLOGY:  I independently ordered, visualized and reviewed these images with the patient  3 XR views of bilateral knees reviewed 9/27/2021: no acute bony abnormality, mild - moderate degenerative changes right knee, left PF degenerative changes milder  - will follow official read      Review of the result(s) of each unique test - XR  Independent interpretation of a test performed by another physician/other qualified health care professional (not separately reported) - XR

## 2023-05-05 DIAGNOSIS — I10 BENIGN ESSENTIAL HYPERTENSION: ICD-10-CM

## 2023-05-05 DIAGNOSIS — Z76.0 ENCOUNTER FOR MEDICATION REFILL: ICD-10-CM

## 2023-05-05 NOTE — TELEPHONE ENCOUNTER
"Routing refill request to provider for review/approval because:  Early refill request  BP out of range   no recent vitist    Last Written Prescription Date:  8/8/22  Last Fill Quantity: 90,  # refills: 3   Last office visit provider:   9/23/21    Requested Prescriptions   Pending Prescriptions Disp Refills     losartan (COZAAR) 25 MG tablet [Pharmacy Med Name: LOSARTAN 25MG TABLETS] 90 tablet 3     Sig: TAKE 1 TABLET(25 MG) BY MOUTH DAILY       Angiotensin-II Receptors Failed - 5/5/2023  3:57 PM        Failed - Last blood pressure under 140/90 in past 12 months     BP Readings from Last 3 Encounters:   02/20/23 (!) 152/93   01/23/23 (!) 145/75   10/14/22 136/68                 Failed - Recent (12 mo) or future (30 days) visit within the authorizing provider's specialty     Patient has had an office visit with the authorizing provider or a provider within the authorizing providers department within the previous 12 mos or has a future within next 30 days. See \"Patient Info\" tab in inbasket, or \"Choose Columns\" in Meds & Orders section of the refill encounter.              Passed - Medication is active on med list        Passed - Patient is age 18 or older        Passed - Normal serum creatinine on file in past 12 months     Recent Labs   Lab Test 01/23/23  1311   CR 1.15       Ok to refill medication if creatinine is low          Passed - Normal serum potassium on file in past 12 months     Recent Labs   Lab Test 01/23/23  1311   POTASSIUM 3.9                         HAILE DEUTSCH RN 05/05/23 3:57 PM  "

## 2023-05-08 RX ORDER — LOSARTAN POTASSIUM 25 MG/1
TABLET ORAL
Qty: 90 TABLET | Refills: 3 | Status: SHIPPED | OUTPATIENT
Start: 2023-05-08 | End: 2024-01-18

## 2023-05-22 ENCOUNTER — MYC MEDICAL ADVICE (OUTPATIENT)
Dept: FAMILY MEDICINE | Facility: CLINIC | Age: 48
End: 2023-05-22
Payer: COMMERCIAL

## 2023-05-22 DIAGNOSIS — R06.83 SNORING: Primary | ICD-10-CM

## 2023-05-22 DIAGNOSIS — R40.0 DAYTIME SLEEPINESS: ICD-10-CM

## 2023-05-25 ENCOUNTER — MYC MEDICAL ADVICE (OUTPATIENT)
Dept: FAMILY MEDICINE | Facility: CLINIC | Age: 48
End: 2023-05-25
Payer: COMMERCIAL

## 2023-05-25 DIAGNOSIS — G89.29 CHRONIC PAIN OF RIGHT KNEE: Primary | ICD-10-CM

## 2023-05-25 DIAGNOSIS — M25.561 CHRONIC PAIN OF RIGHT KNEE: Primary | ICD-10-CM

## 2023-05-30 ENCOUNTER — TRANSFERRED RECORDS (OUTPATIENT)
Dept: HEALTH INFORMATION MANAGEMENT | Facility: CLINIC | Age: 48
End: 2023-05-30
Payer: COMMERCIAL

## 2023-08-30 ENCOUNTER — TRANSFERRED RECORDS (OUTPATIENT)
Dept: HEALTH INFORMATION MANAGEMENT | Facility: CLINIC | Age: 48
End: 2023-08-30
Payer: COMMERCIAL

## 2023-09-18 ENCOUNTER — MYC MEDICAL ADVICE (OUTPATIENT)
Dept: FAMILY MEDICINE | Facility: CLINIC | Age: 48
End: 2023-09-18
Payer: COMMERCIAL

## 2023-09-18 ENCOUNTER — NURSE TRIAGE (OUTPATIENT)
Dept: FAMILY MEDICINE | Facility: CLINIC | Age: 48
End: 2023-09-18
Payer: COMMERCIAL

## 2023-09-18 NOTE — TELEPHONE ENCOUNTER
Nurse Triage SBAR    Is this a 2nd Level Triage? NO    Situation: coughing and tightness in the chest while coughing    Background: symptoms began a week ago    Assessment:   - no fever  - coughing up yellow-green sputum   - chest tightness and SOB when cough  - pt has been taking OTC medications, which seems to help with symptoms but not going away    Protocol Recommended Disposition:   See in Office Today or Tomorrow    Recommendation: appt scheduled 9/21/23         Does the patient meet one of the following criteria for ADS visit consideration? 16+ years old, with an [facility] PCP     TIP  Providers, please consider if this condition is appropriate for management at one of our Acute and Diagnostic Services sites.     If patient is a good candidate, please use dotphrase <dot>triageresponse and select Refer to ADS to document.      KATRIN Lees Cem, RN  [facility]      Reason for Disposition   Continuous (nonstop) coughing interferes with work or school and no improvement using cough treatment per Care Advice    Additional Information   Negative: Bluish (or gray) lips or face   Negative: SEVERE difficulty breathing (e.g., struggling for each breath, speaks in single words)   Negative: Rapid onset of cough and has hives   Negative: Coughing started suddenly after medicine, an allergic food or bee sting   Negative: Difficulty breathing after exposure to flames, smoke, or fumes   Negative: Sounds like a life-threatening emergency to the triager   Negative: Previous asthma attacks and this feels like asthma attack   Negative: Dry cough (non-productive; no sputum or minimal clear sputum) and within 14 days of COVID-19 Exposure   Negative: MODERATE difficulty breathing (e.g., speaks in phrases, SOB even at rest, pulse 100-120) and still present when not coughing   Negative: Chest pain present when not coughing   Negative: Passed out (i.e., fainted, collapsed and was not responding)   Negative: Patient  "sounds very sick or weak to the triager   Negative: MILD difficulty breathing (e.g., minimal/no SOB at rest, SOB with walking, pulse <100) and still present when not coughing   Negative: Coughed up > 1 tablespoon (15 ml) blood (Exception: Blood-tinged sputum.)   Negative: Fever > 103 F (39.4 C)   Negative: Fever > 101 F (38.3 C) and over 60 years of age   Negative: Fever > 100.0 F (37.8 C) and has diabetes mellitus or a weak immune system (e.g., HIV positive, cancer chemotherapy, organ transplant, splenectomy, chronic steroids)   Negative: Fever > 100.0 F (37.8 C) and bedridden (e.g., CVA, chronic illness, recovering from surgery)   Negative: Increasing ankle swelling   Negative: Wheezing is present   Negative: SEVERE coughing spells (e.g., whooping sound after coughing, vomiting after coughing)   Negative: Coughing up lidia-colored (reddish-brown) or blood-tinged sputum   Negative: Fever present > 3 days (72 hours)   Negative: Fever returns after gone for over 24 hours and symptoms worse or not improved   Negative: Using nasal washes and pain medicine > 24 hours and sinus pain persists   Negative: Known COPD or other severe lung disease (i.e., bronchiectasis, cystic fibrosis, lung surgery) and worsening symptoms (i.e., increased sputum purulence or amount, increased breathing difficulty)    Answer Assessment - Initial Assessment Questions  1. ONSET: \"When did the cough begin?\"       A week ago    2. SEVERITY: \"How bad is the cough today?\"       Pretty bad, pt has to take cough drops    3. SPUTUM: \"Describe the color of your sputum\" (none, dry cough; clear, white, yellow, green)      Yes, green and yellow    4. HEMOPTYSIS: \"Are you coughing up any blood?\" If so ask: \"How much?\" (flecks, streaks, tablespoons, etc.)      No    5. DIFFICULTY BREATHING: \"Are you having difficulty breathing?\" If Yes, ask: \"How bad is it?\" (e.g., mild, moderate, severe)     - MILD: No SOB at rest, mild SOB with walking, speaks normally in " "sentences, can lie down, no retractions, pulse < 100.     - MODERATE: SOB at rest, SOB with minimal exertion and prefers to sit, cannot lie down flat, speaks in phrases, mild retractions, audible wheezing, pulse 100-120.     - SEVERE: Very SOB at rest, speaks in single words, struggling to breathe, sitting hunched forward, retractions, pulse > 120       Some SOB after coughing and last about 5- 10 seconds    6. FEVER: \"Do you have a fever?\" If Yes, ask: \"What is your temperature, how was it measured, and when did it start?\"      No    7. CARDIAC HISTORY: \"Do you have any history of heart disease?\" (e.g., heart attack, congestive heart failure)       No    8. LUNG HISTORY: \"Do you have any history of lung disease?\"  (e.g., pulmonary embolus, asthma, emphysema)      No    9. PE RISK FACTORS: \"Do you have a history of blood clots?\" (or: recent major surgery, recent prolonged travel, bedridden)      Not sure    10. OTHER SYMPTOMS: \"Do you have any other symptoms?\" (e.g., runny nose, wheezing, chest pain)        Some chest tightness when coughing, runny nose    11. PREGNANCY: \"Is there any chance you are pregnant?\" \"When was your last menstrual period?\"        No    12. TRAVEL: \"Have you traveled out of the country in the last month?\" (e.g., travel history, exposures)        no    Protocols used: Cough-A-OH    "

## 2023-09-21 ENCOUNTER — OFFICE VISIT (OUTPATIENT)
Dept: FAMILY MEDICINE | Facility: CLINIC | Age: 48
End: 2023-09-21
Payer: COMMERCIAL

## 2023-09-21 VITALS
SYSTOLIC BLOOD PRESSURE: 126 MMHG | DIASTOLIC BLOOD PRESSURE: 80 MMHG | BODY MASS INDEX: 35.7 KG/M2 | RESPIRATION RATE: 16 BRPM | HEART RATE: 87 BPM | WEIGHT: 241 LBS | HEIGHT: 69 IN | OXYGEN SATURATION: 95 % | TEMPERATURE: 97.9 F

## 2023-09-21 DIAGNOSIS — Z11.59 NEED FOR HEPATITIS C SCREENING TEST: ICD-10-CM

## 2023-09-21 DIAGNOSIS — E78.00 HYPERCHOLESTEREMIA: ICD-10-CM

## 2023-09-21 DIAGNOSIS — R73.9 HYPERGLYCEMIA: ICD-10-CM

## 2023-09-21 DIAGNOSIS — Z11.59 NEED FOR HEPATITIS B SCREENING TEST: ICD-10-CM

## 2023-09-21 DIAGNOSIS — J20.9 ACUTE BRONCHITIS, UNSPECIFIED ORGANISM: Primary | ICD-10-CM

## 2023-09-21 DIAGNOSIS — E66.01 SEVERE OBESITY (BMI 35.0-35.9 WITH COMORBIDITY) (H): ICD-10-CM

## 2023-09-21 LAB
ALBUMIN SERPL BCG-MCNC: 4.5 G/DL (ref 3.5–5.2)
ALP SERPL-CCNC: 86 U/L (ref 40–129)
ALT SERPL W P-5'-P-CCNC: 53 U/L (ref 0–70)
ANION GAP SERPL CALCULATED.3IONS-SCNC: 10 MMOL/L (ref 7–15)
AST SERPL W P-5'-P-CCNC: 35 U/L (ref 0–45)
BILIRUB SERPL-MCNC: 1.1 MG/DL
BUN SERPL-MCNC: 17 MG/DL (ref 6–20)
CALCIUM SERPL-MCNC: 9.2 MG/DL (ref 8.6–10)
CHLORIDE SERPL-SCNC: 102 MMOL/L (ref 98–107)
CHOLEST SERPL-MCNC: 244 MG/DL
CREAT SERPL-MCNC: 0.91 MG/DL (ref 0.67–1.17)
DEPRECATED HCO3 PLAS-SCNC: 27 MMOL/L (ref 22–29)
EGFRCR SERPLBLD CKD-EPI 2021: >90 ML/MIN/1.73M2
GLUCOSE SERPL-MCNC: 113 MG/DL (ref 70–99)
HBA1C MFR BLD: 7.2 % (ref 0–5.6)
HDLC SERPL-MCNC: 35 MG/DL
LDLC SERPL CALC-MCNC: 164 MG/DL
NONHDLC SERPL-MCNC: 209 MG/DL
POTASSIUM SERPL-SCNC: 4.2 MMOL/L (ref 3.4–5.3)
PROT SERPL-MCNC: 7.7 G/DL (ref 6.4–8.3)
SODIUM SERPL-SCNC: 139 MMOL/L (ref 136–145)
TRIGL SERPL-MCNC: 225 MG/DL

## 2023-09-21 PROCEDURE — 36415 COLL VENOUS BLD VENIPUNCTURE: CPT | Performed by: FAMILY MEDICINE

## 2023-09-21 PROCEDURE — 80053 COMPREHEN METABOLIC PANEL: CPT | Performed by: FAMILY MEDICINE

## 2023-09-21 PROCEDURE — 83036 HEMOGLOBIN GLYCOSYLATED A1C: CPT | Performed by: FAMILY MEDICINE

## 2023-09-21 PROCEDURE — 86803 HEPATITIS C AB TEST: CPT | Performed by: FAMILY MEDICINE

## 2023-09-21 PROCEDURE — 87340 HEPATITIS B SURFACE AG IA: CPT | Performed by: FAMILY MEDICINE

## 2023-09-21 PROCEDURE — 80061 LIPID PANEL: CPT | Performed by: FAMILY MEDICINE

## 2023-09-21 PROCEDURE — 86706 HEP B SURFACE ANTIBODY: CPT | Performed by: FAMILY MEDICINE

## 2023-09-21 PROCEDURE — 99214 OFFICE O/P EST MOD 30 MIN: CPT | Performed by: FAMILY MEDICINE

## 2023-09-21 RX ORDER — AZITHROMYCIN 250 MG/1
TABLET, FILM COATED ORAL
Qty: 6 TABLET | Refills: 0 | Status: SHIPPED | OUTPATIENT
Start: 2023-09-21 | End: 2023-09-26

## 2023-09-21 NOTE — PROGRESS NOTES
ASSESMENT AND PLAN:  Diagnoses and all orders for this visit:  Acute bronchitis, unspecified organism  Discussed differential diagnosis and treatment options with the patient, he would like to proceed with antibiotic therapy.  -     azithromycin (ZITHROMAX) 250 MG tablet; Take 2 tablets (500 mg) by mouth daily for 1 day, THEN 1 tablet (250 mg) daily for 4 days.  Severe obesity (BMI 35.0-35.9 with comorbidity) (H)  Counseling done on healthy eating and regular exercise.  Hyperglycemia  Reviewed his previous lab results with did not show any significant hyperglycemia.  He has not had a follow-up A1c.  Given this and other multiple unaddressed potential chronic disease concerns I strongly recommended that he follow-up for a full health maintenance visit and to follow-up on these issues here in the clinic.  -     Hemoglobin A1c  -     Comprehensive metabolic panel (BMP + Alb, Alk Phos, ALT, AST, Total. Bili, TP); Future  Hypercholesteremia  -     Lipid panel reflex to direct LDL Fasting; Future  -     Comprehensive metabolic panel (BMP + Alb, Alk Phos, ALT, AST, Total. Bili, TP); Future  Need for hepatitis B screening test  -     Hepatitis B surface antigen; Future  -     Hepatitis B Surface Antibody; Future  Need for hepatitis C screening test  -     Hepatitis C antibody; Future      Reviewed the risks and benefits of the treatment plan with the patient and/or caregiver and we discussed indications for routine and emergent follow-up.        SUBJECTIVE: See history below.  Patient has had some pain across the chest with coughing only.  Cough has been productive of greenish sputum.  His wife is with him today and reports that he has a history of high cholesterol and that she has noticed has been getting some white bumps on the face that she wonders could be related to high cholesterol.    Past Medical History:   Diagnosis Date    Medical history non-contributory      Patient Active Problem List   Diagnosis    Sacroiliac  "joint pain    Benign essential hypertension    Severe obesity (BMI 35.0-35.9 with comorbidity) (H)    Prediabetes     Current Outpatient Medications   Medication Sig Dispense Refill    acetaminophen (TYLENOL) 500 MG tablet Take 1,000 mg by mouth      albuterol (PROVENTIL HFA) 108 (90 Base) MCG/ACT inhaler Inhale 2 puffs into the lungs every 4 hours as needed for shortness of breath / dyspnea or wheezing 18 g 1    aspirin-acetaminophen-caffeine (EXCEDRIN MIGRAINE) 250-250-65 MG tablet Take 2 tablets by mouth      azithromycin (ZITHROMAX) 250 MG tablet Take 2 tablets (500 mg) by mouth daily for 1 day, THEN 1 tablet (250 mg) daily for 4 days. 6 tablet 0    cyclobenzaprine (FLEXERIL) 5 MG tablet Take 1 tablet (5 mg) by mouth nightly as needed for muscle spasms 30 tablet 1    fluticasone (FLONASE) 50 MCG/ACT nasal spray Spray 2 sprays into both nostrils daily      losartan (COZAAR) 25 MG tablet TAKE 1 TABLET(25 MG) BY MOUTH DAILY 90 tablet 3    meclizine (ANTIVERT) 12.5 MG tablet Take 1 tablet (12.5 mg) by mouth 4 times daily as needed for dizziness 30 tablet 0    montelukast (SINGULAIR) 10 MG tablet TAKE 1 TABLET(10 MG) BY MOUTH AT BEDTIME 30 tablet 11    sertraline (ZOLOFT) 50 MG tablet One half tablet daily for one week, then one full tablet daily (Patient not taking: Reported on 9/21/2023) 30 tablet 1     History   Smoking Status    Former    Packs/day: 0.25    Types: Cigarettes   Smokeless Tobacco    Never       OBJECTICE: /80   Pulse 87   Temp 97.9  F (36.6  C) (Oral)   Resp 16   Ht 1.74 m (5' 8.5\")   Wt 109.3 kg (241 lb)   SpO2 95%   BMI 36.11 kg/m          GEN-alert, appropriate, in no apparent distress   HEENT-oropharynx is clear, neck is supple, mucous membranes are moist   CV-regular rate and rhythm with no murmur   RESP-lungs clear to auscultation   SKIN-some white bumps on the face consistent with subcutaneous cholesterol deposition.            9/21/2023    10:23 AM   Additional Questions " "  Roomed by Carole JOHNSON   Accompanied by Spouse       History of Present Illness       Headaches:   Since the patient's last clinic visit, headaches are: worsened  The patient is getting headaches:  When i cough  He is not able to do normal daily activities when he has a migraine.  The patient is taking the following rescue/relief medications:  Ibuprofen (Advil, Motrin), Tylenol and other   Patient states \"I get some relief\" from the rescue/relief medications.   The patient is taking the following medications to prevent migraines:  No medications to prevent migraines  In the past 4 weeks, the patient has gone to an Urgent Care or Emergency Room 0 times times due to headaches.    Reason for visit:  Cough and chest tightness  Symptom onset:  3-7 days ago  Symptoms include:  Cough, tightness in the chest, headache, sore throat, sore muscles, fatique  Symptom intensity:  Severe  Symptom progression:  Staying the same  Had these symptoms before:  No  What makes it worse:  When i inhale i cough and everything hurts.  What makes it better:  Cough drops and constantly taking cough medicine nyquil.    He eats 0-1 servings of fruits and vegetables daily.He consumes 0 sweetened beverage(s) daily.He exercises with enough effort to increase his heart rate 9 or less minutes per day.  He exercises with enough effort to increase his heart rate 3 or less days per week.   He is taking medications regularly.     "

## 2023-09-22 LAB
HBV SURFACE AB SERPL IA-ACNC: 1.13 M[IU]/ML
HBV SURFACE AB SERPL IA-ACNC: NONREACTIVE M[IU]/ML
HBV SURFACE AG SERPL QL IA: NONREACTIVE
HCV AB SERPL QL IA: NONREACTIVE

## 2023-10-02 ENCOUNTER — TELEPHONE (OUTPATIENT)
Dept: FAMILY MEDICINE | Facility: CLINIC | Age: 48
End: 2023-10-02
Payer: COMMERCIAL

## 2023-10-02 NOTE — TELEPHONE ENCOUNTER
Called pt and relayed message. Pt verbalized understanding. Appt scheduled 10/3/23, per pt request soonest opening with any provider.      Sadiq Jain Cem Say, BSN RN  [facility]        ----- Message from Gume Riley MD sent at 10/2/2023 12:19 PM CDT -----  Team - please call patient with results -blood testing shows that he does have type 2 diabetes currently under good control and has significant elevation in the cholesterol.  He should come in for follow-up to discuss this further and determine whether cholesterol-lowering medication is appropriate to reduce his risk of heart disease and stroke.  He does not have hepatitis B or hepatitis C but his blood testing shows he is not immune to hepatitis B so he should get further immunizations for this.  All of these things can be taken care of at a follow-up visit here at the clinic, Dr. Banks is listed as his PCP so scheduling with her would be preferable, okay to schedule with me as a backup option.  Thanks.

## 2023-10-03 ENCOUNTER — TRANSFERRED RECORDS (OUTPATIENT)
Dept: HEALTH INFORMATION MANAGEMENT | Facility: CLINIC | Age: 48
End: 2023-10-03

## 2023-10-03 ENCOUNTER — OFFICE VISIT (OUTPATIENT)
Dept: FAMILY MEDICINE | Facility: CLINIC | Age: 48
End: 2023-10-03
Payer: COMMERCIAL

## 2023-10-03 VITALS
HEIGHT: 69 IN | TEMPERATURE: 97.8 F | WEIGHT: 244.25 LBS | BODY MASS INDEX: 36.18 KG/M2 | HEART RATE: 75 BPM | DIASTOLIC BLOOD PRESSURE: 78 MMHG | RESPIRATION RATE: 16 BRPM | OXYGEN SATURATION: 98 % | SYSTOLIC BLOOD PRESSURE: 132 MMHG

## 2023-10-03 DIAGNOSIS — Z23 IMMUNIZATION DUE: ICD-10-CM

## 2023-10-03 DIAGNOSIS — Z12.11 SCREEN FOR COLON CANCER: ICD-10-CM

## 2023-10-03 DIAGNOSIS — E11.9 TYPE 2 DIABETES MELLITUS WITHOUT COMPLICATION, WITHOUT LONG-TERM CURRENT USE OF INSULIN (H): Primary | ICD-10-CM

## 2023-10-03 DIAGNOSIS — E78.00 HYPERCHOLESTEREMIA: ICD-10-CM

## 2023-10-03 PROCEDURE — 90686 IIV4 VACC NO PRSV 0.5 ML IM: CPT | Performed by: FAMILY MEDICINE

## 2023-10-03 PROCEDURE — 90471 IMMUNIZATION ADMIN: CPT | Performed by: FAMILY MEDICINE

## 2023-10-03 PROCEDURE — 90480 ADMN SARSCOV2 VAC 1/ONLY CMP: CPT | Performed by: FAMILY MEDICINE

## 2023-10-03 PROCEDURE — 90746 HEPB VACCINE 3 DOSE ADULT IM: CPT | Performed by: FAMILY MEDICINE

## 2023-10-03 PROCEDURE — 90472 IMMUNIZATION ADMIN EACH ADD: CPT | Performed by: FAMILY MEDICINE

## 2023-10-03 PROCEDURE — 91320 SARSCV2 VAC 30MCG TRS-SUC IM: CPT | Performed by: FAMILY MEDICINE

## 2023-10-03 PROCEDURE — 99214 OFFICE O/P EST MOD 30 MIN: CPT | Mod: 25 | Performed by: FAMILY MEDICINE

## 2023-10-03 RX ORDER — METFORMIN HCL 500 MG
500 TABLET, EXTENDED RELEASE 24 HR ORAL
Qty: 90 TABLET | Refills: 3 | Status: SHIPPED | OUTPATIENT
Start: 2023-10-03 | End: 2024-01-04

## 2023-10-03 RX ORDER — LANCETS
EACH MISCELLANEOUS
Qty: 100 EACH | Refills: 6 | Status: SHIPPED | OUTPATIENT
Start: 2023-10-03 | End: 2024-06-24

## 2023-10-03 RX ORDER — ATORVASTATIN CALCIUM 20 MG/1
20 TABLET, FILM COATED ORAL DAILY
Qty: 90 TABLET | Refills: 3 | Status: SHIPPED | OUTPATIENT
Start: 2023-10-03 | End: 2024-01-04 | Stop reason: ALTCHOICE

## 2023-10-03 NOTE — LETTER
October 3, 2023      Dalila Malone  [address] [place] [id number]        To Whom It May Concern:    Dalila Malone was seen in our clinic. He may return to work without restrictions.    Please contact us with any questions or concerns.       Sincerely,            Chi Vieyra MD

## 2023-10-03 NOTE — PROGRESS NOTES
"  Assessment & Plan     Type 2 diabetes mellitus without complication, without long-term current use of insulin (H)  Newly diagnosed DM with A1c 7.2%, not at goal <7%. Discussed lifestyle recommendations, he would like to see DM educator - referral placed. With a random glucose 267 about 8 months ago, that is diagnostic of diabetes. Start metformin 500mg daily, stop sugary drinks including soda, discussed exercise, and will recheck in 3 months.   - metFORMIN (GLUCOPHAGE XR) 500 MG 24 hr tablet  Dispense: 90 tablet; Refill: 3  - AMB Adult Diabetes Educator Referral  - blood glucose monitoring (NO BRAND SPECIFIED) meter device kit  Dispense: 1 kit; Refill: 0  - thin (NO BRAND SPECIFIED) lancets  Dispense: 100 each; Refill: 6  - blood glucose (NO BRAND SPECIFIED) test strip  Dispense: 100 strip; Refill: 6    Screen for colon cancer  Deferred discussion     Immunization due  Negative hep b titers, ok with hep B shot today as well as flu and covid.   - HEPATITIS B, ADULT 20+ (ENGERIX-B/RECOMBIVAX HB)  - INFLUENZA VACCINE >6 MONTHS (AFLURIA/FLUZONE)  - COVID-19 12+ (2023-24) (PFIZER)    Hypercholesteremia  Xanthelasma   ASCVD risk >6% with DM and HTN (controlled), start medium statin. Can consider treatment for xanthelasma in the future - would need derm referral.   - atorvastatin (LIPITOR) 20 MG tablet  Dispense: 90 tablet; Refill: 3               BMI:   Estimated body mass index is 36.6 kg/m  as calculated from the following:    Height as of this encounter: 1.74 m (5' 8.5\").    Weight as of this encounter: 110.8 kg (244 lb 4 oz).   Weight management plan: Patient was referred to their PCP to discuss a diet and exercise plan.    Return in about 3 months (around 1/3/2024) for diabetes follow up, with PCP.      Chi Vieyra MD  [facility] KENYON Conner is a 48 year old, presenting for the following health issues:  RECHECK (Labs; potentially hep b first dose)      History of Present Illness " "      Diabetes:   He presents for follow up of diabetes.    He is not checking blood glucose.         He has no concerns regarding his diabetes at this time.  He is having excessive thirst, blurry vision and weight gain.            Reason for visit:  Type 2 diabetes, high cholesterol, and shot    He eats 0-1 servings of fruits and vegetables daily.He consumes 0 sweetened beverage(s) daily.He exercises with enough effort to increase his heart rate 10 to 19 minutes per day.  He exercises with enough effort to increase his heart rate 3 or less days per week.   He is taking medications regularly.       Lab Results   Component Value Date    A1C 7.2 09/21/2023    A1C 6.3 09/23/2021       The 10-year ASCVD risk score (Zane BARRON, et al., 2019) is: 13%    Values used to calculate the score:      Age: 48 years      Sex: Male      Is Non- : No      Diabetic: Yes      Tobacco smoker: No      Systolic Blood Pressure: 132 mmHg      Is BP treated: Yes      HDL Cholesterol: 35 mg/dL      Total Cholesterol: 244 mg/dL        Review of Systems   Constitutional, HEENT, cardiovascular, pulmonary, gi and gu systems are negative, except as otherwise noted.      Objective    /78   Pulse 75   Temp 97.8  F (36.6  C) (Oral)   Resp 16   Ht 1.74 m (5' 8.5\")   Wt 110.8 kg (244 lb 4 oz)   SpO2 98%   BMI 36.60 kg/m    Body mass index is 36.6 kg/m .  Physical Exam   GENERAL: healthy, alert and no distress  NECK: no adenopathy, no asymmetry, masses, or scars and thyroid normal to palpation  RESP: lungs clear to auscultation - no rales, rhonchi or wheezes  CV: regular rate and rhythm, normal S1 S2, no S3 or S4, no murmur, click or rub, no peripheral edema and peripheral pulses strong  ABDOMEN: soft, nontender, no hepatosplenomegaly, no masses and bowel sounds normal  MS: no gross musculoskeletal defects noted, no edema  SKIN: xanthelasma above both eyebrows    Office Visit on 09/21/2023   Component Date Value Ref " Range Status    Hemoglobin A1C 09/21/2023 7.2 (H)  0.0 - 5.6 % Final    Normal <5.7%   Prediabetes 5.7-6.4%    Diabetes 6.5% or higher     Note: Adopted from ADA consensus guidelines.    Cholesterol 09/21/2023 244 (H)  <200 mg/dL Final    Triglycerides 09/21/2023 225 (H)  <150 mg/dL Final    Direct Measure HDL 09/21/2023 35 (L)  >=40 mg/dL Final    LDL Cholesterol Calculated 09/21/2023 164 (H)  <=100 mg/dL Final    Non HDL Cholesterol 09/21/2023 209 (H)  <130 mg/dL Final    Sodium 09/21/2023 139  136 - 145 mmol/L Final    Potassium 09/21/2023 4.2  3.4 - 5.3 mmol/L Final    Chloride 09/21/2023 102  98 - 107 mmol/L Final    Carbon Dioxide (CO2) 09/21/2023 27  22 - 29 mmol/L Final    Anion Gap 09/21/2023 10  7 - 15 mmol/L Final    Urea Nitrogen 09/21/2023 17.0  6.0 - 20.0 mg/dL Final    Creatinine 09/21/2023 0.91  0.67 - 1.17 mg/dL Final    Calcium 09/21/2023 9.2  8.6 - 10.0 mg/dL Final    Glucose 09/21/2023 113 (H)  70 - 99 mg/dL Final    Alkaline Phosphatase 09/21/2023 86  40 - 129 U/L Final    AST 09/21/2023 35  0 - 45 U/L Final    Reference intervals for this test were updated on 6/12/2023 to more accurately reflect our healthy population. There may be differences in the flagging of prior results with similar values performed with this method. Interpretation of those prior results can be made in the context of the updated reference intervals.    ALT 09/21/2023 53  0 - 70 U/L Final    Reference intervals for this test were updated on 6/12/2023 to more accurately reflect our healthy population. There may be differences in the flagging of prior results with similar values performed with this method. Interpretation of those prior results can be made in the context of the updated reference intervals.      Protein Total 09/21/2023 7.7  6.4 - 8.3 g/dL Final    Albumin 09/21/2023 4.5  3.5 - 5.2 g/dL Final    Bilirubin Total 09/21/2023 1.1  <=1.2 mg/dL Final    GFR Estimate 09/21/2023 >90  >60 mL/min/1.73m2 Final     Hepatitis B Surface Antigen 09/21/2023 Nonreactive  Nonreactive Final    Hepatitis B Surface Antibody Instr* 09/21/2023 1.13  <8.00 m[IU]/mL Final    Hepatitis B Surface Antibody 09/21/2023 Nonreactive   Final    No antibody detected when the value is less than 8.00 mIU/mL.    Hepatitis C Antibody 09/21/2023 Nonreactive  Nonreactive Final     No results found for any visits on 10/03/23.  No results found for this or any previous visit (from the past 24 hour(s)).

## 2023-10-18 DIAGNOSIS — R05.1 ACUTE COUGH: ICD-10-CM

## 2023-10-18 RX ORDER — ALBUTEROL SULFATE 90 UG/1
AEROSOL, METERED RESPIRATORY (INHALATION)
Qty: 18 G | Refills: 1 | Status: SHIPPED | OUTPATIENT
Start: 2023-10-18

## 2023-10-31 ENCOUNTER — VIRTUAL VISIT (OUTPATIENT)
Dept: EDUCATION SERVICES | Facility: CLINIC | Age: 48
End: 2023-10-31
Payer: COMMERCIAL

## 2023-10-31 DIAGNOSIS — E11.9 TYPE 2 DIABETES MELLITUS WITHOUT COMPLICATION, WITHOUT LONG-TERM CURRENT USE OF INSULIN (H): ICD-10-CM

## 2023-10-31 PROCEDURE — 97802 MEDICAL NUTRITION INDIV IN: CPT | Mod: 93 | Performed by: DIETITIAN, REGISTERED

## 2023-10-31 NOTE — PROGRESS NOTES
Diabetes Self-Management Education & Support    Presents for: Initial Assessment for new diagnosis    Type of Service: Telephone Visit    Originating Location (Patient Location): Home  Distant Location (Provider Location): Offsite  Mode of Communication:  Telephone    Telephone Visit Start Time:  10:00 AM  Telephone Visit End Time (telephone visit stop time): 10:16 AM    How would patient like to obtain AVS? Florinda    Assessment Type:   ASSESSMENT:  Met with patient today via telephone to discuss his new diagnosis of diabetes.  Reviewed diabetes pathophysiology, BG goals, A1C goals and importance of making lifestyle changes. Reviewed benefits of activity as well. Patient cut back to 1 cup of rice. We reviewed holidays and social eating how to handle those situation. Discussed use of glucose meter - patient has been checking BG once daily and seeing BG between  mg/dL - much improved.    Reviewed food changes, patient has already adjusted his eating habits - eating less rice, reducing added sugar by cutting out pop and alcohol/beer. Patient is now going to the gym 3 days a week with his son as well. Pt lost a sister to complications of diabetes, so he is motivated to not allow this to get worse.       Patient's most recent   Lab Results   Component Value Date    A1C 7.2 09/21/2023     is not meeting goal of <7.0    Diabetes knowledge and skills assessment:   Patient is knowledgeable in diabetes management concepts related to: Healthy Eating, Being Active, Monitoring, Taking Medication, Problem Solving, Reducing Risks, and Healthy Coping    Continue education with the following diabetes management concepts: Monitoring    Based on learning assessment above, most appropriate setting for further diabetes education would be: Individual setting.      PLAN  Continue with current Metformin 500 mg/day - could consider increasing at next A1C check  Continue with lifestyle changes  Topics to cover at upcoming visits:  "Monitoring    Follow-up: 3-6 months as needed     See Care Plan for co-developed, patient-state behavior change goals.  AVS provided for patient today.    Education Materials Provided:  No new materials provided today      SUBJECTIVE/OBJECTIVE:  Presents for: Initial Assessment for new diagnosis  Accompanied by: Self  Diabetes education in the past 24mo: No  Diabetes type: Type 2  Disease course: Improving  How confident are you filling out medical forms by yourself:: Extremely  Diabetes management related comments/concerns: food choices  Transportation concerns: No  Difficulty affording diabetes medication?: No  Difficulty affording diabetes testing supplies?: No  Other concerns:: None  Cultural Influences/Ethnic Background:  Not  or     Diabetes Symptoms & Complications:  Fatigue: Sometimes  Neuropathy: No  Symptom course: Improving  Complications assessed today?: Yes  Autonomic neuropathy: No  CVA: No  Heart disease: No  Nephropathy: No  Peripheral neuropathy: No  Peripheral Vascular Disease: No  Retinopathy: No  Sexual dysfunction: No    Patient Problem List and Family Medical History reviewed for relevant medical history, current medical status, and diabetes risk factors.    Vitals:  There were no vitals taken for this visit.  Estimated body mass index is 36.6 kg/m  as calculated from the following:    Height as of 10/3/23: 1.74 m (5' 8.5\").    Weight as of 10/3/23: 110.8 kg (244 lb 4 oz).   Last 3 BP:   BP Readings from Last 3 Encounters:   10/03/23 132/78   09/21/23 126/80   02/20/23 (!) 152/93       History   Smoking Status    Former    Packs/day: 0.25    Types: Cigarettes   Smokeless Tobacco    Never       Labs:  Lab Results   Component Value Date    A1C 7.2 09/21/2023     Lab Results   Component Value Date     09/21/2023     06/30/2022     Lab Results   Component Value Date     09/21/2023     Direct Measure HDL   Date Value Ref Range Status   09/21/2023 35 (L) >=40 mg/dL " "Final   ]  GFR Estimate   Date Value Ref Range Status   09/21/2023 >90 >60 mL/min/1.73m2 Final   06/24/2021 >60 >60 mL/min/1.73m2 Final     GFR Estimate If Black   Date Value Ref Range Status   06/24/2021 >60 >60 mL/min/1.73m2 Final     Lab Results   Component Value Date    CR 0.91 09/21/2023     No results found for: \"MICROALBUMIN\"    Healthy Eating:  Healthy Eating Assessed Today: Yes  Meal planning/habits: Avoiding sweets, Smaller portions  Meals include: Breakfast, Lunch, Dinner  Breakfast: rice, meat, vegetables  Lunch: rice, meat, vegetables  Dinner: rice, meat, vegetables  Other: cut back on snacking and sweets  Beverages: Water, Soda, Alcohol, Other (cut out beer and soda)  Has patient met with a dietitian in the past?: No    Being Active:  Being Active Assessed Today: Yes  Exercise:: Yes  Days per week of moderate to strenuous exercise (like a brisk walk): 3  On average, minutes per day of exercise at this level: 40  How intense was your typical exercise? : Moderate (like brisk walking)  Exercise Minutes per Week: 120    Monitoring:  Monitoring Assessed Today: Yes  Did patient bring glucose meter to appointment? : No  Times checking blood sugar at home (number): 1  Times checking blood sugar at home (per): Day      Taking Medications:  Diabetes Medication(s)       Biguanides       metFORMIN (GLUCOPHAGE XR) 500 MG 24 hr tablet    Take 1 tablet (500 mg) by mouth daily (with dinner)            Taking Medication Assessed Today: Yes  Current Treatments: Diet, Oral Medication (taken by mouth)  Problems taking diabetes medications regularly?: Yes  Diabetes medication side effects?: No    Problem Solving:  Problem Solving Assessed Today: Yes              Reducing Risks:  Reducing Risks Assessed Today: Yes  Diabetes Risks: Age over 45 years, Ethnicity, Family History  CAD Risks: Hypertension, Male sex, Obesity, Diabetes Mellitus, Family history  Has dilated eye exam at least once a year?: No  Sees dentist every 6 " months?: Yes    Healthy Coping:  Healthy Coping Assessed Today: Yes  Emotional response to diabetes: Ready to learn  Informal Support system:: Family  Stage of change: ACTION (Actively working towards change)  Patient Activation Measure Survey Score:       No data to display                  Care Plan and Education Provided:  Care Plan: Diabetes   Updates made by Carmen Tan RD since 10/31/2023 12:00 AM        Problem: HbA1C Not In Goal         Goal: Establish Regular Follow-Ups with PCP         Task: Discuss with PCP the recommended timing for patient's next follow up visit(s)    Responsible User: Carmen Tan RD        Task: Discuss schedule for PCP visits with patient    Responsible User: Carmen Tan RD        Goal: Get HbA1C Level in Goal         Task: Educate patient on diabetes education self-management topics    Responsible User: Carmen Tan RD        Task: Educate patient on benefits of regular glucose monitoring    Responsible User: Carmen Tan RD        Task: Refer patient to appropriate extended care team member, as needed (Medication Therapy Management, Behavioral Health, Physical Therapy, etc.)    Responsible User: Carmen Tan RD        Task: Discuss diabetes treatment plan with patient    Responsible User: Carmen Tan RD        Problem: Diabetes Self-Management Education Needed to Optimize Self-Care Behaviors         Goal: Understand diabetes pathophysiology and disease progression         Task: Provide education on diabetes pathophysiology and disease progression specfic to patient's diabetes type    Responsible User: Carmen Tna RD        Goal: Healthy Eating - follow a healthy eating pattern for diabetes    This Visit's Progress: 100%   Note:    Decrease portions to 1 cup rice/meal       Task: Provide education on portion control and consistency in amount, composition and timing of food intake Completed 10/31/2023   Responsible  User: Carmen Tan RD        Task: Provide education on managing carbohydrate intake (carbohydrate counting, plate planning method, etc.)    Responsible User: Carmen Tan RD        Task: Provide education on weight management    Responsible User: Carmen Tan RD        Task: Provide education on heart healthy eating    Responsible User: Carmen Tan RD        Task: Provide education on eating out    Responsible User: Carmen Tan RD        Task: Develop individualized healthy eating plan with patient    Responsible User: Carmen Tan RD        Goal: Being Active - get regular physical activity, working up to at least 150 minutes per week         Task: Provide education on relationship of activity to glucose and precautions to take if at risk for low glucose    Responsible User: Carmen Tan RD        Task: Discuss barriers to physical activity with patient    Responsible User: Carmen Tan RD        Task: Develop physical activity plan with patient    Responsible User: Carmen Tan RD        Task: Explore community resources including walking groups, assistance programs, and home videos    Responsible User: Carmen Tan RD        Goal: Monitoring - monitor glucose and ketones as directed    This Visit's Progress: 100%   Note:    Check BG once daily       Task: Provide education on blood glucose monitoring (purpose, proper technique, frequency, glucose targets, interpreting results, when to use glucose control solution, sharps disposal) Completed 10/31/2023   Responsible User: Carmen Tan RD        Task: Provide education on continuous glucose monitoring (sensor placement, use of travis or /reader, understanding glucose trends, alerts and alarms, differences between sensor glucose and blood glucose)    Responsible User: Carmen Tan RD        Task: Provide education on ketone monitoring (when to monitor, frequency,  etc.)    Responsible User: Carmen Tan RD        Goal: Taking Medication - patient is consistently taking medications as directed         Task: Provide education on action of prescribed medication, including when to take and possible side effects    Responsible User: Carmen Tan RD        Task: Provide education on insulin and injectable diabetes medications, including administration, storage, site selection and rotation for injection sites    Responsible User: Carmen Tan RD        Task: Discuss barriers to medication adherence with patient and provide management technique ideas as appropriate    Responsible User: Carmen Tan RD        Task: Provide education on frequency and refill details of medications    Responsible User: Carmen Tan RD        Goal: Problem Solving - know how to prevent and manage short-term diabetes complications         Task: Provide education on high blood glucose - causes, signs/symptoms, prevention and treatment    Responsible User: Carmen Tan RD        Task: Provide education on low blood glucose - causes, signs/symptoms, prevention, treatment, carrying a carbohydrate source at all times, and medical identification    Responsible User: Carmen Tan RD        Task: Provide education on safe travel with diabetes    Responsible User: Carmen Tan RD        Task: Provide education on how to care for diabetes on sick days    Responsible User: Carmen Tan RD        Task: Provide education on when to call a health care provider    Responsible User: Carmen Tan RD        Goal: Reducing Risks - know how to prevent and treat long-term diabetes complications         Task: Provide education on major complications of diabetes, prevention, early diagnostic measures and treatment of complications Completed 10/31/2023   Responsible User: Carmen Tan RD        Task: Provide education on recommended care for  dental, eye and foot health Completed 10/31/2023   Responsible User: Carmen Tan RD        Task: Provide education on Hemoglobin A1c - goals and relationship to blood glucose levels Completed 10/31/2023   Responsible User: Carmen Tan RD        Task: Provide education on recommendations for heart health - lipid levels and goals, blood pressure and goals, and aspirin therapy, if indicated    Responsible User: Carmen Tan RD        Task: Provide education on tobacco cessation    Responsible User: Carmen Tan RD        Goal: Healthy Coping - use available resources to cope with the challenges of managing diabetes         Task: Discuss recognizing feelings about having diabetes Completed 10/31/2023   Responsible User: Carmen Tan RD        Task: Provide education on the benefits of making appropriate lifestyle changes    Responsible User: Carmen Tan RD        Task: Provide education on benefits of utilizing support systems    Responsible User: Carmen Tan RD        Task: Discuss methods for coping with stress    Responsible User: Carmen Tan RD        Task: Provide education on when to seek professional counseling    Responsible User: Carmen Tan RD            Time Spent: 16 minutes  Encounter Type: Individual    Any diabetes medication dose changes were made via the CDE Protocol per the patient's referring provider. A copy of this encounter was shared with the provider.

## 2023-10-31 NOTE — PATIENT INSTRUCTIONS
Thank you for visiting me today regarding your new diagnosis of type 2 diabetes     You can control diabetes  You can prevent or delay the progression of diabetes with healthy lifestyle choices.  1.  Exercise 30 minutes a day, at least five times per week (or 150 minutes of exercise per week).    2.  Lose weight if you need to. If you are overweight, losing just 5 to 10% of your body weight (an  average of 15 pounds) may reduce your risk.    3. Cut back calories in your diet. Choose healthy, nutritious, and unprocessed foods (lean meats, plant proteins, whole grains, fruits and vegetables).    4.  Recheck A1C in the next 3-6 months.    5. You may need medication to delay the progression of diabetes. Continue to follow up with your diabetes educator or primary care provider.    6. If you are interested in starting to check blood sugars with a glucometer please reach out.    7. You are welcome to schedule a follow up with me (your diabetes educator)  anytime by calling the triage/scheduling line to help with lifestyle changes    Healthy Eating:  Plate method eating: fill half your plate with non-starchy vegetables, 1/4 plate with lean protein, 1/4 plate with carbohydrates (think whole grains/fruit/starchy vegetables/bean or legumes). Check out this web page for more details.  https://www.diabetesfoodhub.org/articles/what-is-the-diabetes-plate-method.html  [place] acquired by [employer]/Bands[place]plate.gov    2. Try to eat whole foods as much as possible, these are more nutritious with vitamins/minerals and fiber. Look out for added sugar, read nutrition labels and look at the grams of added sugar. Goal is 25g per day or less for women and 35g per day or less for men.    3. Only eat when you are truly hungry and choose whole foods as much as possible. Save sweets and other treats for special occassions.     4. Check out this website for TONS of healthy recipes:  https://www.diabetesfoodhub.org/all-recipes.html    Choosemyplate.gov    Snacks  Try to  limit snacking to only when you are truly hungry (not tired, thirsty ,or bored). Here are some snack ideas.. Adding protein or small amount of healthy fat to a snack helps you to feel full longer and eat less. Choose whole grains when able. Also remember non-starchy vegetables are a fantastic snack, add some hummus or dip to go with it.   - Apple or small banana  or celery/carrots with 1-2 tbs nut butter  - 1/2 cup cottage cheese with peach or pear (size of tennis ball)  - 1/2 cup tuna w/alexandra OR 1/2 avocado/guacamole on whole wheat toast OR dann bread OR cut up veggies  - 1/4 cup hummus (and salsa if desired) with ~10 tortilla chips  - 1 cup plain greek yogurt with 1 cup berries  - 4-6oz flavored greek yogurt (look for lower added sugar)  - 1/4 cup nuts or trail mix  - 3 cups popcorn  -1+ cup edamame       Thank you!  Carmen aTn RDN, LD, [facility]   Certified Diabetes Care &   [phone]

## 2023-10-31 NOTE — LETTER
10/31/2023         RE: Dalila Malone  [address] [place] [address]        Dear Colleague,    Thank you for referring your patient, Dalila Malone, to the [facility]. Please see a copy of my visit note below.    Diabetes Self-Management Education & Support    Presents for: Initial Assessment for new diagnosis    Type of Service: Telephone Visit    Originating Location (Patient Location): Home  Distant Location (Provider Location): Offsite  Mode of Communication:  Telephone    Telephone Visit Start Time:  10:00 AM  Telephone Visit End Time (telephone visit stop time): 10:16 AM    How would patient like to obtain AVS? MyChart    Assessment Type:   ASSESSMENT:  Met with patient today via telephone to discuss his new diagnosis of diabetes.  Reviewed diabetes pathophysiology, BG goals, A1C goals and importance of making lifestyle changes. Reviewed benefits of activity as well. Patient cut back to 1 cup of rice. We reviewed holidays and social eating how to handle those situation. Discussed use of glucose meter - patient has been checking BG once daily and seeing BG between  mg/dL - much improved.    Reviewed food changes, patient has already adjusted his eating habits - eating less rice, reducing added sugar by cutting out pop and alcohol/beer. Patient is now going to the gym 3 days a week with his son as well. Pt lost a sister to complications of diabetes, so he is motivated to not allow this to get worse.       Patient's most recent   Lab Results   Component Value Date    A1C 7.2 09/21/2023     is not meeting goal of <7.0    Diabetes knowledge and skills assessment:   Patient is knowledgeable in diabetes management concepts related to: Healthy Eating, Being Active, Monitoring, Taking Medication, Problem Solving, Reducing Risks, and Healthy Coping    Continue education with the following diabetes management concepts: Monitoring    Based on learning assessment above, most appropriate  "setting for further diabetes education would be: Individual setting.      PLAN  Continue with current Metformin 500 mg/day - could consider increasing at next A1C check  Continue with lifestyle changes  Topics to cover at upcoming visits: Monitoring    Follow-up: 3-6 months as needed     See Care Plan for co-developed, patient-state behavior change goals.  AVS provided for patient today.    Education Materials Provided:  No new materials provided today      SUBJECTIVE/OBJECTIVE:  Presents for: Initial Assessment for new diagnosis  Accompanied by: Self  Diabetes education in the past 24mo: No  Diabetes type: Type 2  Disease course: Improving  How confident are you filling out medical forms by yourself:: Extremely  Diabetes management related comments/concerns: food choices  Transportation concerns: No  Difficulty affording diabetes medication?: No  Difficulty affording diabetes testing supplies?: No  Other concerns:: None  Cultural Influences/Ethnic Background:  Not  or     Diabetes Symptoms & Complications:  Fatigue: Sometimes  Neuropathy: No  Symptom course: Improving  Complications assessed today?: Yes  Autonomic neuropathy: No  CVA: No  Heart disease: No  Nephropathy: No  Peripheral neuropathy: No  Peripheral Vascular Disease: No  Retinopathy: No  Sexual dysfunction: No    Patient Problem List and Family Medical History reviewed for relevant medical history, current medical status, and diabetes risk factors.    Vitals:  There were no vitals taken for this visit.  Estimated body mass index is 36.6 kg/m  as calculated from the following:    Height as of 10/3/23: 1.74 m (5' 8.5\").    Weight as of 10/3/23: 110.8 kg (244 lb 4 oz).   Last 3 BP:   BP Readings from Last 3 Encounters:   10/03/23 132/78   09/21/23 126/80   02/20/23 (!) 152/93       History   Smoking Status     Former     Packs/day: 0.25     Types: Cigarettes   Smokeless Tobacco     Never       Labs:  Lab Results   Component Value Date    A1C " "7.2 09/21/2023     Lab Results   Component Value Date     09/21/2023     06/30/2022     Lab Results   Component Value Date     09/21/2023     Direct Measure HDL   Date Value Ref Range Status   09/21/2023 35 (L) >=40 mg/dL Final   ]  GFR Estimate   Date Value Ref Range Status   09/21/2023 >90 >60 mL/min/1.73m2 Final   06/24/2021 >60 >60 mL/min/1.73m2 Final     GFR Estimate If Black   Date Value Ref Range Status   06/24/2021 >60 >60 mL/min/1.73m2 Final     Lab Results   Component Value Date    CR 0.91 09/21/2023     No results found for: \"MICROALBUMIN\"    Healthy Eating:  Healthy Eating Assessed Today: Yes  Meal planning/habits: Avoiding sweets, Smaller portions  Meals include: Breakfast, Lunch, Dinner  Breakfast: rice, meat, vegetables  Lunch: rice, meat, vegetables  Dinner: rice, meat, vegetables  Other: cut back on snacking and sweets  Beverages: Water, Soda, Alcohol, Other (cut out beer and soda)  Has patient met with a dietitian in the past?: No    Being Active:  Being Active Assessed Today: Yes  Exercise:: Yes  Days per week of moderate to strenuous exercise (like a brisk walk): 3  On average, minutes per day of exercise at this level: 40  How intense was your typical exercise? : Moderate (like brisk walking)  Exercise Minutes per Week: 120    Monitoring:  Monitoring Assessed Today: Yes  Did patient bring glucose meter to appointment? : No  Times checking blood sugar at home (number): 1  Times checking blood sugar at home (per): Day      Taking Medications:  Diabetes Medication(s)       Biguanides       metFORMIN (GLUCOPHAGE XR) 500 MG 24 hr tablet    Take 1 tablet (500 mg) by mouth daily (with dinner)            Taking Medication Assessed Today: Yes  Current Treatments: Diet, Oral Medication (taken by mouth)  Problems taking diabetes medications regularly?: Yes  Diabetes medication side effects?: No    Problem Solving:  Problem Solving Assessed Today: Yes              Reducing " Risks:  Reducing Risks Assessed Today: Yes  Diabetes Risks: Age over 45 years, Ethnicity, Family History  CAD Risks: Hypertension, Male sex, Obesity, Diabetes Mellitus, Family history  Has dilated eye exam at least once a year?: No  Sees dentist every 6 months?: Yes    Healthy Coping:  Healthy Coping Assessed Today: Yes  Emotional response to diabetes: Ready to learn  Informal Support system:: Family  Stage of change: ACTION (Actively working towards change)  Patient Activation Measure Survey Score:       No data to display                  Care Plan and Education Provided:  Care Plan: Diabetes   Updates made by Carmen Tan RD since 10/31/2023 12:00 AM        Problem: HbA1C Not In Goal         Goal: Establish Regular Follow-Ups with PCP         Task: Discuss with PCP the recommended timing for patient's next follow up visit(s)    Responsible User: Carmen Tan RD        Task: Discuss schedule for PCP visits with patient    Responsible User: Carmen Tan RD        Goal: Get HbA1C Level in Goal         Task: Educate patient on diabetes education self-management topics    Responsible User: Carmen Tan RD        Task: Educate patient on benefits of regular glucose monitoring    Responsible User: Carmen Tan RD        Task: Refer patient to appropriate extended care team member, as needed (Medication Therapy Management, Behavioral Health, Physical Therapy, etc.)    Responsible User: Carmen Tan RD        Task: Discuss diabetes treatment plan with patient    Responsible User: Carmen Tan RD        Problem: Diabetes Self-Management Education Needed to Optimize Self-Care Behaviors         Goal: Understand diabetes pathophysiology and disease progression         Task: Provide education on diabetes pathophysiology and disease progression specfic to patient's diabetes type    Responsible User: Carmen Tan RD        Goal: Healthy Eating - follow a healthy  eating pattern for diabetes    This Visit's Progress: 100%   Note:    Decrease portions to 1 cup rice/meal       Task: Provide education on portion control and consistency in amount, composition and timing of food intake Completed 10/31/2023   Responsible User: Carmen Tan RD        Task: Provide education on managing carbohydrate intake (carbohydrate counting, plate planning method, etc.)    Responsible User: Carmen Tan RD        Task: Provide education on weight management    Responsible User: Carmen Tan RD        Task: Provide education on heart healthy eating    Responsible User: Carmen Tan RD        Task: Provide education on eating out    Responsible User: Carmen Tan RD        Task: Develop individualized healthy eating plan with patient    Responsible User: Carmen Tan RD        Goal: Being Active - get regular physical activity, working up to at least 150 minutes per week         Task: Provide education on relationship of activity to glucose and precautions to take if at risk for low glucose    Responsible User: Carmen Tan RD        Task: Discuss barriers to physical activity with patient    Responsible User: Carmen Tan RD        Task: Develop physical activity plan with patient    Responsible User: Carmen Tan RD        Task: Explore community resources including walking groups, assistance programs, and home videos    Responsible User: Carmen Tan RD        Goal: Monitoring - monitor glucose and ketones as directed    This Visit's Progress: 100%   Note:    Check BG once daily       Task: Provide education on blood glucose monitoring (purpose, proper technique, frequency, glucose targets, interpreting results, when to use glucose control solution, sharps disposal) Completed 10/31/2023   Responsible User: Carmen Tan RD        Task: Provide education on continuous glucose monitoring (sensor placement, use  of travis or /reader, understanding glucose trends, alerts and alarms, differences between sensor glucose and blood glucose)    Responsible User: Carmen Tan RD        Task: Provide education on ketone monitoring (when to monitor, frequency, etc.)    Responsible User: Carmen Tan RD        Goal: Taking Medication - patient is consistently taking medications as directed         Task: Provide education on action of prescribed medication, including when to take and possible side effects    Responsible User: Carmen Tan RD        Task: Provide education on insulin and injectable diabetes medications, including administration, storage, site selection and rotation for injection sites    Responsible User: Carmen Tan RD        Task: Discuss barriers to medication adherence with patient and provide management technique ideas as appropriate    Responsible User: Carmen Tan RD        Task: Provide education on frequency and refill details of medications    Responsible User: Carmen Tan RD        Goal: Problem Solving - know how to prevent and manage short-term diabetes complications         Task: Provide education on high blood glucose - causes, signs/symptoms, prevention and treatment    Responsible User: Carmen Tan RD        Task: Provide education on low blood glucose - causes, signs/symptoms, prevention, treatment, carrying a carbohydrate source at all times, and medical identification    Responsible User: Carmen Tan RD        Task: Provide education on safe travel with diabetes    Responsible User: Carmen Tan RD        Task: Provide education on how to care for diabetes on sick days    Responsible User: Carmen Tan RD        Task: Provide education on when to call a health care provider    Responsible User: Carmen Tan RD        Goal: Reducing Risks - know how to prevent and treat long-term diabetes complications          Task: Provide education on major complications of diabetes, prevention, early diagnostic measures and treatment of complications Completed 10/31/2023   Responsible User: Carmen Tan RD        Task: Provide education on recommended care for dental, eye and foot health Completed 10/31/2023   Responsible User: Carmen Tan RD        Task: Provide education on Hemoglobin A1c - goals and relationship to blood glucose levels Completed 10/31/2023   Responsible User: Carmen Tan RD        Task: Provide education on recommendations for heart health - lipid levels and goals, blood pressure and goals, and aspirin therapy, if indicated    Responsible User: Carmen Tan RD        Task: Provide education on tobacco cessation    Responsible User: Carmen Tan RD        Goal: Healthy Coping - use available resources to cope with the challenges of managing diabetes         Task: Discuss recognizing feelings about having diabetes Completed 10/31/2023   Responsible User: Carmen Tan RD        Task: Provide education on the benefits of making appropriate lifestyle changes    Responsible User: Carmen Tan RD        Task: Provide education on benefits of utilizing support systems    Responsible User: Carmen Tan RD        Task: Discuss methods for coping with stress    Responsible User: Carmen Tan RD        Task: Provide education on when to seek professional counseling    Responsible User: Carmen Tan RD            Time Spent: 16 minutes  Encounter Type: Individual    Any diabetes medication dose changes were made via the CDE Protocol per the patient's referring provider. A copy of this encounter was shared with the provider.

## 2024-01-04 ENCOUNTER — OFFICE VISIT (OUTPATIENT)
Dept: FAMILY MEDICINE | Facility: CLINIC | Age: 49
End: 2024-01-04
Attending: FAMILY MEDICINE
Payer: COMMERCIAL

## 2024-01-04 VITALS
TEMPERATURE: 97.9 F | OXYGEN SATURATION: 96 % | BODY MASS INDEX: 37.18 KG/M2 | SYSTOLIC BLOOD PRESSURE: 144 MMHG | WEIGHT: 251 LBS | DIASTOLIC BLOOD PRESSURE: 88 MMHG | RESPIRATION RATE: 20 BRPM | HEIGHT: 69 IN | HEART RATE: 68 BPM

## 2024-01-04 DIAGNOSIS — H02.63 XANTHELASMA OF EYELID, BILATERAL: ICD-10-CM

## 2024-01-04 DIAGNOSIS — R42 DIZZINESS: ICD-10-CM

## 2024-01-04 DIAGNOSIS — I10 BENIGN ESSENTIAL HYPERTENSION: ICD-10-CM

## 2024-01-04 DIAGNOSIS — M17.30 POST-TRAUMATIC OSTEOARTHRITIS OF KNEE, UNSPECIFIED LATERALITY: Primary | ICD-10-CM

## 2024-01-04 DIAGNOSIS — H02.66 XANTHELASMA OF EYELID, BILATERAL: ICD-10-CM

## 2024-01-04 DIAGNOSIS — E78.00 HYPERCHOLESTEREMIA: ICD-10-CM

## 2024-01-04 DIAGNOSIS — E11.9 TYPE 2 DIABETES MELLITUS WITHOUT COMPLICATION, WITHOUT LONG-TERM CURRENT USE OF INSULIN (H): ICD-10-CM

## 2024-01-04 LAB
CREAT UR-MCNC: 211 MG/DL
HBA1C MFR BLD: 8.8 % (ref 0–5.6)
HOLD SPECIMEN: NORMAL
MICROALBUMIN UR-MCNC: <12 MG/L
MICROALBUMIN/CREAT UR: NORMAL MG/G{CREAT}

## 2024-01-04 PROCEDURE — 99214 OFFICE O/P EST MOD 30 MIN: CPT | Performed by: FAMILY MEDICINE

## 2024-01-04 PROCEDURE — 82043 UR ALBUMIN QUANTITATIVE: CPT | Performed by: FAMILY MEDICINE

## 2024-01-04 PROCEDURE — 36415 COLL VENOUS BLD VENIPUNCTURE: CPT | Performed by: FAMILY MEDICINE

## 2024-01-04 PROCEDURE — 82570 ASSAY OF URINE CREATININE: CPT | Performed by: FAMILY MEDICINE

## 2024-01-04 PROCEDURE — 83036 HEMOGLOBIN GLYCOSYLATED A1C: CPT | Performed by: FAMILY MEDICINE

## 2024-01-04 RX ORDER — PRAVASTATIN SODIUM 20 MG
20 TABLET ORAL DAILY
Qty: 90 TABLET | Refills: 3 | Status: SHIPPED | OUTPATIENT
Start: 2024-01-04 | End: 2024-06-26 | Stop reason: DRUGHIGH

## 2024-01-04 RX ORDER — MECLIZINE HCL 12.5 MG 12.5 MG/1
12.5 TABLET ORAL 4 TIMES DAILY PRN
Qty: 30 TABLET | Refills: 3 | Status: SHIPPED | OUTPATIENT
Start: 2024-01-04 | End: 2024-08-05

## 2024-01-04 RX ORDER — METFORMIN HCL 500 MG
1000 TABLET, EXTENDED RELEASE 24 HR ORAL 2 TIMES DAILY WITH MEALS
Qty: 360 TABLET | Refills: 3 | Status: SHIPPED | OUTPATIENT
Start: 2024-01-04 | End: 2024-05-13

## 2024-01-04 NOTE — PROGRESS NOTES
"  Assessment & Plan     Post-traumatic osteoarthritis of knee: Completed a handicapped parking form.  He plans to follow-up with orthopedics and is hoping to have a knee replacement of the right knee.    Type 2 diabetes mellitus without complication, without long-term current use of insulin (H): A1C has increased to 8.8 from 7.2 at last visit.  He feels much of this is due to his recent inactivity with his increased pain in his right knee.  We will increase metformin from 500 mg daily to 1000 mg twice daily.  Also adding Jardiance 25 mg/day.  Encouraged him to eat a breakfast with protein so he does not get so hypoglycemic and shaky at midday and will hopefully eat a more balanced lunch.   - Lipid Profile  - HEMOGLOBIN A1C  - Albumin Random Urine Quantitative with Creat Ratio  - metFORMIN (GLUCOPHAGE XR) 500 MG 24 hr tablet  Dispense: 360 tablet; Refill: 3  - empagliflozin (JARDIANCE) 25 MG TABS tablet  Dispense: 90 tablet; Refill: 1    Dizziness: gets dizzy intermittently.   - meclizine (ANTIVERT) 12.5 MG tablet  Dispense: 30 tablet; Refill: 3    Hypercholesteremia: having cramping from atorvastatin.  Will switch to moderate intensity statin, pravastatin, in hopes that this will lessen his muscle cramps but will still be somewhat effective for lowering cholesterol.  - pravastatin (PRAVACHOL) 20 MG tablet  Dispense: 90 tablet; Refill: 3    Xanthelasma of eyelid, bilateral  - pravastatin (PRAVACHOL) 20 MG tablet  Dispense: 90 tablet; Refill: 3      Elina Banks MD  [facility] KENYON Conner is a 48 year old, presenting for the following health issues:  Follow Up (Weight, diabetes, \"everything\".) and Form Request (Disability parking due to knee pain hard to walk)      1/4/2024     9:42 AM   Additional Questions   Roomed by Isaura TUCKER       History of Present Illness       Diabetes:   He presents for follow up of diabetes.  He is checking home blood glucose two times daily.   He checks " "blood glucose before and after meals.  Blood glucose is never over 200 and never under 70. He is aware of hypoglycemia symptoms including shakiness and weakness.    He has no concerns regarding his diabetes at this time.  He is having weight gain.  The patient has not had a diabetic eye exam in the last 12 months.            Has severe osteoarthritis and a previous ACL injury in his right knee. Has done cortisone injections. They help for about 1 month.  He would like to get a knee replacement but the first orthopedic doctor saw was unsure about doing this since he is relatively young.  However, he is interested in getting a second opinion.      Sometimes around midday, his blood Sugar is in the 80s and he feels shaky.     Has been getting muscle cramping after taking atorvastatin, mostly in thighs.        Objective    BP (!) 142/88   Pulse 74   Temp 97.9  F (36.6  C) (Oral)   Resp 20   Ht 1.74 m (5' 8.5\")   Wt 113.9 kg (251 lb)   SpO2 96%   BMI 37.61 kg/m    Body mass index is 37.61 kg/m .  Physical Exam   GENERAL: healthy, alert and no distress  EYES: Eyes grossly normal to inspection, PERRL and conjunctivae and sclerae normal  HENT: ear canals and TM's normal, nose and mouth without ulcers or lesions  NECK: no adenopathy, no asymmetry, masses, or scars and thyroid normal to palpation  MS: no gross musculoskeletal defects noted, no edema  SKIN: no suspicious lesions or rashes  NEURO: Normal strength and tone, mentation intact and speech normal  PSYCH: mentation appears normal, affect normal/bright  Diabetic foot exam: normal DP and PT pulses, no trophic changes or ulcerative lesions, and normal sensory exam                  "

## 2024-01-09 ENCOUNTER — MYC REFILL (OUTPATIENT)
Dept: FAMILY MEDICINE | Facility: CLINIC | Age: 49
End: 2024-01-09
Payer: COMMERCIAL

## 2024-01-09 DIAGNOSIS — G44.89 OTHER HEADACHE SYNDROME: Primary | ICD-10-CM

## 2024-01-09 DIAGNOSIS — I10 BENIGN ESSENTIAL HYPERTENSION: ICD-10-CM

## 2024-01-09 DIAGNOSIS — Z76.0 ENCOUNTER FOR MEDICATION REFILL: ICD-10-CM

## 2024-01-09 RX ORDER — LOSARTAN POTASSIUM 25 MG/1
25 TABLET ORAL DAILY
Qty: 90 TABLET | Refills: 3 | OUTPATIENT
Start: 2024-01-09

## 2024-01-11 ASSESSMENT — ENCOUNTER SYMPTOMS
PARESTHESIAS: 0
DYSURIA: 0
HEADACHES: 1
DIZZINESS: 0
CHILLS: 0
NAUSEA: 1
FREQUENCY: 0
SHORTNESS OF BREATH: 1
DIARRHEA: 0
JOINT SWELLING: 1
HEMATURIA: 0
ABDOMINAL PAIN: 0
HEARTBURN: 1
NERVOUS/ANXIOUS: 1
MYALGIAS: 1
SORE THROAT: 0
WEAKNESS: 1
EYE PAIN: 0
COUGH: 0
CONSTIPATION: 0
ARTHRALGIAS: 1
HEMATOCHEZIA: 0
FEVER: 0
PALPITATIONS: 0

## 2024-01-18 ENCOUNTER — ORDERS ONLY (AUTO-RELEASED) (OUTPATIENT)
Dept: FAMILY MEDICINE | Facility: CLINIC | Age: 49
End: 2024-01-18

## 2024-01-18 ENCOUNTER — OFFICE VISIT (OUTPATIENT)
Dept: FAMILY MEDICINE | Facility: CLINIC | Age: 49
End: 2024-01-18
Payer: COMMERCIAL

## 2024-01-18 VITALS
HEIGHT: 69 IN | DIASTOLIC BLOOD PRESSURE: 95 MMHG | BODY MASS INDEX: 36.29 KG/M2 | OXYGEN SATURATION: 96 % | RESPIRATION RATE: 16 BRPM | TEMPERATURE: 97.5 F | SYSTOLIC BLOOD PRESSURE: 142 MMHG | WEIGHT: 245 LBS | HEART RATE: 71 BPM

## 2024-01-18 DIAGNOSIS — Z00.00 ROUTINE GENERAL MEDICAL EXAMINATION AT A HEALTH CARE FACILITY: Primary | ICD-10-CM

## 2024-01-18 DIAGNOSIS — R40.0 DAYTIME SLEEPINESS: ICD-10-CM

## 2024-01-18 DIAGNOSIS — E11.9 TYPE 2 DIABETES MELLITUS WITHOUT COMPLICATION, WITHOUT LONG-TERM CURRENT USE OF INSULIN (H): ICD-10-CM

## 2024-01-18 DIAGNOSIS — Z12.11 SCREEN FOR COLON CANCER: ICD-10-CM

## 2024-01-18 DIAGNOSIS — R06.83 SNORING: ICD-10-CM

## 2024-01-18 DIAGNOSIS — I10 BENIGN ESSENTIAL HYPERTENSION: ICD-10-CM

## 2024-01-18 PROCEDURE — 99396 PREV VISIT EST AGE 40-64: CPT | Performed by: FAMILY MEDICINE

## 2024-01-18 PROCEDURE — 99214 OFFICE O/P EST MOD 30 MIN: CPT | Mod: 25 | Performed by: FAMILY MEDICINE

## 2024-01-18 RX ORDER — LOSARTAN POTASSIUM 25 MG/1
25 TABLET ORAL DAILY
Qty: 90 TABLET | Refills: 3 | Status: SHIPPED | OUTPATIENT
Start: 2024-01-18 | End: 2024-07-24 | Stop reason: DRUGHIGH

## 2024-01-18 ASSESSMENT — ENCOUNTER SYMPTOMS
HEADACHES: 1
DIZZINESS: 0
JOINT SWELLING: 1
CONSTIPATION: 0
NERVOUS/ANXIOUS: 1
ARTHRALGIAS: 1
FEVER: 0
SORE THROAT: 0
SHORTNESS OF BREATH: 1
HEMATURIA: 0
DYSURIA: 0
EYE PAIN: 0
ABDOMINAL PAIN: 0
DIARRHEA: 0
WEAKNESS: 1
MYALGIAS: 1
COUGH: 0
FREQUENCY: 0
CHILLS: 0
PALPITATIONS: 0
NAUSEA: 1

## 2024-01-18 NOTE — PROGRESS NOTES
"Preventive Care Visit  [facility] KENYON Banks MD, Family Medicine  2024      SUBJECTIVE:   Dalila is a 48 year old, presenting for the following:  Physical (BP Check )        2024    10:46 AM   Additional Questions   Roomed by Yesenia LARA ma   Accompanied by Self     Healthy Habits:     Getting at least 3 servings of Calcium per day:  Yes    Bi-annual eye exam:  NO    Dental care twice a year:  Yes    Sleep apnea or symptoms of sleep apnea:  Daytime drowsiness and Excessive snoring    Diet:  Low salt and Other    Frequency of exercise:  None    Taking medications regularly:  Yes    Medication side effects:  Muscle aches and Lightheadedness    Additional concerns today:  No      Ran out of losartan 1 week ago. Stopped drinking pickle juice. Muscle cramping is better now on lower intensity statin (pravastatin).     Made appt for gel injection for knee. waiting for insurance approval.     Snores when really really tired. Only sometimes. Sometimes feels extra tired during the day, after a meal. Has been referred to sleep medicine in the past.       Social History     Tobacco Use    Smoking status: Former     Packs/day: 0.50     Years: 10.00     Additional pack years: 0.00     Total pack years: 5.00     Types: Cigarettes     Start date: 1996     Quit date: 2021     Years since quittin.4     Passive exposure: Never    Smokeless tobacco: Never    Tobacco comments:     smokes 6-8 per day   Substance Use Topics    Alcohol use: Not Currently     Comment: Alcoholic Drinks/day: occasional             2024    10:21 AM   Alcohol Use   Prescreen: >3 drinks/day or >7 drinks/week? No       Last PSA: No results found for: \"PSA\"    Reviewed orders with patient. Reviewed health maintenance and updated orders accordingly - Yes  Lab work is in process    Reviewed and updated as needed this visit by clinical staff   Tobacco  Allergies  Meds              Reviewed and updated as needed this " "visit by Provider                      OBJECTIVE:   BP (!) 142/95   Pulse 71   Temp 97.5  F (36.4  C) (Temporal)   Resp 16   Ht 1.74 m (5' 8.5\")   Wt 111.1 kg (245 lb)   SpO2 96%   BMI 36.71 kg/m     Estimated body mass index is 37.61 kg/m  as calculated from the following:    Height as of 1/4/24: 1.74 m (5' 8.5\").    Weight as of 1/4/24: 113.9 kg (251 lb).  Review of Systems   Constitutional:  Negative for chills and fever.   HENT:  Positive for congestion. Negative for ear pain, hearing loss and sore throat.    Eyes:  Positive for visual disturbance. Negative for pain.   Respiratory:  Positive for shortness of breath. Negative for cough.    Cardiovascular:  Negative for chest pain and palpitations.   Gastrointestinal:  Positive for nausea. Negative for abdominal pain, constipation and diarrhea.   Genitourinary:  Negative for dysuria, frequency, genital sores, hematuria, penile discharge and urgency.   Musculoskeletal:  Positive for arthralgias, joint swelling and myalgias.   Skin:  Negative for rash.   Neurological:  Positive for weakness and headaches. Negative for dizziness.   Psychiatric/Behavioral:  The patient is nervous/anxious.        Physical Exam  GENERAL: alert and no distress  EYES: Eyes grossly normal to inspection   HENT: ear canals and TM's normal, nose and mouth without ulcers or lesions  NECK: no adenopathy, no asymmetry, masses, or scars  RESP: lungs clear to auscultation - no rales, rhonchi or wheezes  CV: regular rate and rhythm, normal S1 S2, no S3 or S4, no murmur, click or rub, no peripheral edema  ABDOMEN: soft, nontender, no hepatosplenomegaly, no masses and bowel sounds normal  MS: no gross musculoskeletal defects noted, no edema  SKIN: no suspicious lesions or rashes, except xanthelasma present on both eyelids  neuro: Normal strength and tone, mentation intact and speech normal  PSYCH: mentation appears normal, affect normal/bright  LYMPH: no cervical, supraclavicular, axillary, or " inguinal adenopathy        ASSESSMENT/PLAN:   Routine general medical examination at a health care facility      Screen for colon cancer  - CHRIS[employer]E([employer])    Type 2 diabetes mellitus without complication, without long-term current use of insulin (H): Added Jardiance to her regimen at last appointment, along with metformin.  Discussed that sleepiness after meals may be related to hyperglycemia.  Hoping that sugars will come down gradually.  May need to add a third agent at next A1c check.    Snoring  - Adult Sleep Eval & Management  Referral    Daytime sleepiness  - Adult Sleep Eval & Management  Referral    Benign essential hypertension: Will restart losartan and check BP in 2 weeks  - losartan (COZAAR) 25 MG tablet  Dispense: 90 tablet; Refill: 3      Patient has been advised of split billing requirements and indicates understanding: Yes      Counseling  Reviewed preventive health counseling, as reflected in patient instructions       Regular exercise       Healthy diet/nutrition        He reports that he quit smoking about 2 years ago. His smoking use included cigarettes. He started smoking about 28 years ago. He has a 5 pack-year smoking history. He has never been exposed to tobacco smoke. He has never used smokeless tobacco.          Signed Electronically by: Elina Banks MD  Answers submitted by the patient for this visit:  Annual Preventive Visit (Submitted on 1/11/2024)  Chief Complaint: Annual Exam:  Blood in stool: No  heartburn: Yes  peripheral edema: No  mood changes: No  Skin sensation changes: No  impotence: Yes

## 2024-01-30 ENCOUNTER — TRANSFERRED RECORDS (OUTPATIENT)
Dept: HEALTH INFORMATION MANAGEMENT | Facility: CLINIC | Age: 49
End: 2024-01-30
Payer: COMMERCIAL

## 2024-02-01 ENCOUNTER — ALLIED HEALTH/NURSE VISIT (OUTPATIENT)
Dept: FAMILY MEDICINE | Facility: CLINIC | Age: 49
End: 2024-02-01
Payer: COMMERCIAL

## 2024-02-01 VITALS — DIASTOLIC BLOOD PRESSURE: 91 MMHG | SYSTOLIC BLOOD PRESSURE: 138 MMHG

## 2024-02-01 DIAGNOSIS — I10 BENIGN ESSENTIAL HYPERTENSION: Primary | ICD-10-CM

## 2024-02-01 PROCEDURE — 99207 PR NO CHARGE NURSE ONLY: CPT

## 2024-02-01 NOTE — PROGRESS NOTES
I met with Dalila Malone at the request of Dr. Banks to recheck his blood pressure.  Blood pressure medications on the med list were reviewed with patient.    Patient has taken all medications as per usual regimen: Yes  Patient reports tolerating them without any issues or concerns: Yes    There were no vitals filed for this visit.    BP: 138/91    Blood pressure was taken, previous encounter was reviewed, recorded blood pressure below 140/90.  Patient was discharged and the note will be sent to the provider for final review.        NEREIDA Lees CemN RN  [facility]

## 2024-02-03 LAB — NONINV COLON CA DNA+OCC BLD SCRN STL QL: NEGATIVE

## 2024-02-12 ENCOUNTER — TELEPHONE (OUTPATIENT)
Dept: FAMILY MEDICINE | Facility: CLINIC | Age: 49
End: 2024-02-12
Payer: COMMERCIAL

## 2024-02-12 NOTE — TELEPHONE ENCOUNTER
Patient Quality Outreach    Patient is due for the following:   Hypertension -  Hypertension follow-up visit    Next Steps:   No follow up needed at this time.  Patient has upcoming appointment, these items will be addressed at that time.    Type of outreach:    Chart review performed, no outreach needed.      Questions for provider review:               Sadiq Bird RN

## 2024-04-17 DIAGNOSIS — E11.9 TYPE 2 DIABETES MELLITUS WITHOUT COMPLICATION, WITHOUT LONG-TERM CURRENT USE OF INSULIN (H): Primary | ICD-10-CM

## 2024-05-05 ENCOUNTER — HEALTH MAINTENANCE LETTER (OUTPATIENT)
Age: 49
End: 2024-05-05

## 2024-05-06 ASSESSMENT — SLEEP AND FATIGUE QUESTIONNAIRES
HOW LIKELY ARE YOU TO NOD OFF OR FALL ASLEEP WHILE SITTING AND TALKING TO SOMEONE: WOULD NEVER DOZE
HOW LIKELY ARE YOU TO NOD OFF OR FALL ASLEEP WHILE WATCHING TV: SLIGHT CHANCE OF DOZING
HOW LIKELY ARE YOU TO NOD OFF OR FALL ASLEEP WHILE LYING DOWN TO REST IN THE AFTERNOON WHEN CIRCUMSTANCES PERMIT: MODERATE CHANCE OF DOZING
HOW LIKELY ARE YOU TO NOD OFF OR FALL ASLEEP WHEN YOU ARE A PASSENGER IN A CAR FOR AN HOUR WITHOUT A BREAK: WOULD NEVER DOZE
HOW LIKELY ARE YOU TO NOD OFF OR FALL ASLEEP WHILE SITTING AND READING: HIGH CHANCE OF DOZING
HOW LIKELY ARE YOU TO NOD OFF OR FALL ASLEEP WHILE SITTING INACTIVE IN A PUBLIC PLACE: MODERATE CHANCE OF DOZING
HOW LIKELY ARE YOU TO NOD OFF OR FALL ASLEEP IN A CAR, WHILE STOPPED FOR A FEW MINUTES IN TRAFFIC: WOULD NEVER DOZE
HOW LIKELY ARE YOU TO NOD OFF OR FALL ASLEEP WHILE SITTING QUIETLY AFTER LUNCH WITHOUT ALCOHOL: MODERATE CHANCE OF DOZING

## 2024-05-08 ENCOUNTER — TRANSFERRED RECORDS (OUTPATIENT)
Dept: HEALTH INFORMATION MANAGEMENT | Facility: CLINIC | Age: 49
End: 2024-05-08
Payer: COMMERCIAL

## 2024-05-12 ENCOUNTER — LAB (OUTPATIENT)
Dept: LAB | Facility: CLINIC | Age: 49
End: 2024-05-12
Payer: COMMERCIAL

## 2024-05-12 DIAGNOSIS — E11.9 TYPE 2 DIABETES MELLITUS WITHOUT COMPLICATION, WITHOUT LONG-TERM CURRENT USE OF INSULIN (H): ICD-10-CM

## 2024-05-12 LAB — HBA1C MFR BLD: 7 % (ref 0–5.6)

## 2024-05-12 PROCEDURE — 83036 HEMOGLOBIN GLYCOSYLATED A1C: CPT

## 2024-05-12 PROCEDURE — 36415 COLL VENOUS BLD VENIPUNCTURE: CPT

## 2024-05-13 ENCOUNTER — OFFICE VISIT (OUTPATIENT)
Dept: SLEEP MEDICINE | Facility: CLINIC | Age: 49
End: 2024-05-13
Attending: FAMILY MEDICINE
Payer: COMMERCIAL

## 2024-05-13 VITALS
HEART RATE: 71 BPM | DIASTOLIC BLOOD PRESSURE: 80 MMHG | BODY MASS INDEX: 35.4 KG/M2 | WEIGHT: 239 LBS | SYSTOLIC BLOOD PRESSURE: 116 MMHG | HEIGHT: 69 IN | RESPIRATION RATE: 16 BRPM | OXYGEN SATURATION: 97 %

## 2024-05-13 DIAGNOSIS — G47.33 OBSTRUCTIVE SLEEP APNEA: Primary | ICD-10-CM

## 2024-05-13 DIAGNOSIS — R40.0 DAYTIME SLEEPINESS: ICD-10-CM

## 2024-05-13 DIAGNOSIS — R06.83 SNORING: ICD-10-CM

## 2024-05-13 PROCEDURE — 99213 OFFICE O/P EST LOW 20 MIN: CPT | Performed by: STUDENT IN AN ORGANIZED HEALTH CARE EDUCATION/TRAINING PROGRAM

## 2024-05-13 NOTE — PROGRESS NOTES
[facility]  Consultation Note    Name: Dalila Malone MRN#: [id number]   Age: 49 year old YOB: 1975     Date of Consultation: 05/13/24  Consultation is requested by: Elina Banks  Primary care provider: Elina Banks MD    History of Present Illness:   Dalila Malone is a 49 year old male patient with HTN, T2DM, and HLD   He is sent by Elina Banks for a sleep consultation regarding Consult (Discuss snoring)  .    Dalila Malone main reason for visit: Fatigue during work hours.  Patient states problem(s) started: About 6 months ago  Dalila Malone's goals for this visit:      He has not had a previous sleep study.    CPAP: No    Assessment and Plan:     Problem List Items Addressed This Visit       Obstructive sleep apnea - Primary     STOP BANG score is 7/8. Patient likely has sleep apnea based on clinical history of snoring, EDS(ESS 10/24), witnessed apneas, BMI(35.8), neck circumference(4 cm), gender, insomnia(NIECY 15/28), snort arousals, nocturia, morning headaches, sleep inertia, and unrefreshing sleep.   Obstructive sleep apnea reviewed. Complications of Untreated Sleep Apnea Reviewed.  HST/ Polysomnography reviewed. Information provided regarding treatment options for FLYNN.  Recommend in-lab sleep study to assess for sleep disordered breathing due to high risk for FLYNN           Relevant Orders    Comprehensive Sleep Study     Other Visit Diagnoses       Snoring        Daytime sleepiness                SLEEP-WAKE SCHEDULE:   Work/School Days: Patient goes to school/work: Yes   Usually gets into bed at 9 pm  Takes patient about 1 hour to 2 hours to fall asleep  Has trouble falling asleep 3 to 4 days a week. nights per week  Wakes up in the middle of the night 2 times times.  Wakes up due to Snorting self awake;Pain;External stimuli (bed partner, pets, noise, etc);Use the bathroom  He has trouble falling back asleep 3 to 4 times a week times a week.   It usually takes 30 minutes to 1 hour to get back to  sleep  Patient is usually up at 4:30 am  Uses alarm: Yes  Sleep Inertia: Yes    Weekends/Non-work Days/All Other Days:  Usually gets into bed at 10 pm   Takes patient about 1 to 2 hours to fall asleep  Patient is usually up at 5 am  Uses alarm: No    Sleep Need  Patient gets  4 to 5 hours a night. sleep on average   Patient thinks He needs about 6 to 8 hours a night sleep    Dalila Malone prefers to sleep in this position(s): Back;Side;Head Elevated   Patient states they do the following activities in bed: Watch TV;Use phone, computer, or tablet    Naps  Patient takes a purposeful nap Saturday and Sunday, once per day times a week and naps are usually 2 hour naps in duration  He feels better after a nap: Yes  He dozes off unintentionally 2 to 3 times a week days per week  Patient has had a driving accident or near-miss due to sleepiness/drowsiness: No      SLEEP DISRUPTIONS:  Breathing/Snoring  Patient snores:Yes  Other people complain about His snoring: Yes  Patient has been told He stops breathing in His sleep:Yes  He has issues with the following: Morning headaches;Stuffy nose when you wake up;Getting up to urinate more than once    Movement:  Patient gets pain, discomfort, with an urge to move:  Yes  It happens when He is resting:  Yes  It happens more at night:  Yes  Patient has been told Dalila Malone kicks His legs at night:  No     Behaviors in Sleep:  Dalila Malone has experienced the following behaviors while sleeping:    He has experienced sudden muscle weakness during the day: Yes    Is there anything else you would like your sleep provider to know:        CAFFEINE AND OTHER SUBSTANCES:  Patient consumes caffeinated beverages per day:  1  Last caffeine use is usually: 9 am  List of any prescribed or over the counter stimulants that patient takes: 0  List of any prescribed or over the counter sleep medication patient takes: 0  List of previous sleep medications that patient has tried: Sleep gummies  Patient drinks  alcohol to help them sleep: No  Patient drinks alcohol near bedtime: No    Family History:  Patient has a family member been diagnosed with a sleep disorder: Yes  My 12 year old son.         SCALES:  EPWORTH SLEEPINESS SCALE       5/6/2024    11:48 AM    [place] Sleepiness Scale ( OZZIE Wilkes  [id number]<br>ESS - [place]/English - Final version - 21 Nov 07 - [facility] [place].)   Sitting and reading High chance of dozing   Watching TV Slight chance of dozing   Sitting, inactive in a public place (e.g. a theatre or a meeting) Moderate chance of dozing   As a passenger in a car for an hour without a break Would never doze   Lying down to rest in the afternoon when circumstances permit Moderate chance of dozing   Sitting and talking to someone Would never doze   Sitting quietly after a lunch without alcohol Moderate chance of dozing   In a car, while stopped for a few minutes in traffic Would never doze   [place] Score (MC) 10   [place] Score (Sleep) 10       INSOMNIA SEVERITY INDEX (NIECY)        5/6/2024    11:32 AM   Insomnia Severity Index (NIECY)   Difficulty falling asleep 2   Difficulty staying asleep 2   Problems waking up too early 2   How SATISFIED/DISSATISFIED are you with your CURRENT sleep pattern? 2   How NOTICEABLE to others do you think your sleep problem is in terms of impairing the quality of your life? 2   How WORRIED/DISTRESSED are you about your current sleep problem? 2   To what extent do you consider your sleep problem to INTERFERE with your daily functioning (e.g. daytime fatigue, mood, ability to function at work/daily chores, concentration, memory, mood, etc.) CURRENTLY? 3   NIECY Total Score 15    Guidelines for Scoring/Interpretation:  Total score categories:  0-7 = No clinically significant insomnia   8-14 = Subthreshold insomnia   15-21 = Clinical insomnia (moderate severity)  22-28 = Clinical insomnia (severe)  Used via courtesy of www.[employer] [place].gov with permission from Esdras Salazar PhD.,  "[facility]      STOP BANG   FLYNN High Risk             Total Score: 7    FLYNN: Snores loudly    FLYNN: Often tired    FLYNN: Observed stopped breathing    FLYNN: Hypertension    FLYNN: BMI over 35 kg/m2    FLYNN: Neck Circum >16 in    FLYNN: Male          GAD7       No data to display                  CAGE-AID       No data to display              CAGE-AID reprinted with permission from the [place] Medical Journal, GORGE Bauman. and SUPRIYA Garrison, \"Conjoint screening questionnaires for alcohol and drug abuse\" [place] Medical Journal 94: 135-140, 1995.      PATIENT HEALTH QUESTIONNAIRE-9 (PHQ - 9)      7/6/2022    11:32 AM   PHQ-9 ([employer])   1.  Little interest or pleasure in doing things 1   2.  Feeling down, depressed, or hopeless 1   3.  Trouble falling or staying asleep, or sleeping too much 1   4.  Feeling tired or having little energy 2   5.  Poor appetite or overeating 2   6.  Feeling bad about yourself - or that you are a failure or have let yourself or your family down 2   7.  Trouble concentrating on things, such as reading the newspaper or watching television 0   8.  Moving or speaking so slowly that other people could have noticed. Or the opposite - being so fidgety or restless that you have been moving around a lot more than usual 1   9.  Thoughts that you would be better off dead, or of hurting yourself in some way 0   PHQ-9 Total Score 10   6.  Feeling bad about yourself 2   7.  Trouble concentrating 0   8.  Moving slowly or restless 1   9.  Suicidal or self-harm thoughts 0   1.  Little interest or pleasure in doing things Several days   2.  Feeling down, depressed, or hopeless Several days   3.  Trouble falling or staying asleep, or sleeping too much Several days   4.  Feeling tired or having little energy More than half the days   5.  Poor appetite or overeating More than half the days   6.  Feeling bad about yourself More than half the days   7.  Trouble concentrating Not at all   8.  Moving slowly or " restless Several days   9.  Suicidal or self-harm thoughts Not at all   PHQ-9 via MyChart TOTAL SCORE-----> 10 (Moderate depression)   Difficulty at work, home, or with people Extremely difficult     Developed by Shell Albright, Eve Bean, Lane Elias and colleagues, with an educational mayra from [employer]. No permission required to reproduce, translate, display or distribute.      Allergies:    No Known Allergies    Medications:    Current Outpatient Medications   Medication Sig Dispense Refill    acetaminophen (TYLENOL) 500 MG tablet Take 1,000 mg by mouth      albuterol (PROVENTIL HFA) 108 (90 Base) MCG/ACT inhaler Inhale 2 puffs into the lungs every 4 hours as needed for shortness of breath / dyspnea or wheezing 18 g 1    albuterol (VENTOLIN HFA) 108 (90 Base) MCG/ACT inhaler INHALE 2 PUFFS INTO THE LUNGS EVERY 6 HOURS AS NEEDED FOR SHORTNESS OF BREATH OR WHEEZING OR COUGH 18 g 1    aspirin-acetaminophen-caffeine (EXCEDRIN MIGRAINE) 250-250-65 MG tablet Take 2 tablets by mouth daily as needed for headaches 60 tablet 1    blood glucose (NO BRAND SPECIFIED) test strip Use to test blood sugar 1 times daily or as directed. To accompany: Blood Glucose Monitor Brands: per insurance. 100 strip 6    blood glucose monitoring (NO BRAND SPECIFIED) meter device kit Use to test blood sugar 1 times daily or as directed. Preferred blood glucose meter OR supplies to accompany: Blood Glucose Monitor Brands: per insurance. 1 kit 0    cyclobenzaprine (FLEXERIL) 5 MG tablet Take 1 tablet (5 mg) by mouth nightly as needed for muscle spasms 30 tablet 1    empagliflozin (JARDIANCE) 25 MG TABS tablet Take 1 tablet (25 mg) by mouth daily 90 tablet 1    fluticasone (FLONASE) 50 MCG/ACT nasal spray Spray 2 sprays into both nostrils daily      losartan (COZAAR) 25 MG tablet Take 1 tablet (25 mg) by mouth daily 90 tablet 3    meclizine (ANTIVERT) 12.5 MG tablet Take 1 tablet (12.5 mg) by mouth 4 times daily as needed  for dizziness 30 tablet 3    montelukast (SINGULAIR) 10 MG tablet TAKE 1 TABLET(10 MG) BY MOUTH AT BEDTIME 30 tablet 11    pravastatin (PRAVACHOL) 20 MG tablet Take 1 tablet (20 mg) by mouth daily 90 tablet 3    thin (NO BRAND SPECIFIED) lancets Use with lanceting device. To accompany: Blood Glucose Monitor Brands: per insurance. 100 each 6       Problem List:  Patient Active Problem List    Diagnosis Date Noted    Obstructive sleep apnea 2024     Priority: Medium    Post-traumatic osteoarthritis of knee, unspecified laterality 2024     Priority: Medium    Type 2 diabetes mellitus without complication, without long-term current use of insulin (H) 10/03/2023     Priority: Medium    Severe obesity (BMI 35.0-35.9 with comorbidity) (H) 2022     Priority: Medium     2022 BMI 37.4      Prediabetes 2021     Priority: Medium     21: A1C 6.3      Sacroiliac joint pain 2019     Priority: Medium    Benign essential hypertension 2019     Priority: Medium        Past Medical/Surgical History:  Past Medical History:   Diagnosis Date    Arthritis 2021    Knees, elbows, and ankle hurt.    Diabetes (H)     Hypertension 2019    Medical history non-contributory     Uncomplicated asthma 2021    Tightness in the chest     Past Surgical History:   Procedure Laterality Date    NO PAST SURGERIES         Social History:  Social History     Socioeconomic History    Marital status:      Spouse name: Not on file    Number of children: Not on file    Years of education: Not on file    Highest education level: Not on file   Occupational History    Not on file   Tobacco Use    Smoking status: Former     Current packs/day: 0.00     Average packs/day: 0.5 packs/day for 25.6 years (12.8 ttl pk-yrs)     Types: Cigarettes     Start date: 1996     Quit date: 2021     Years since quittin.7     Passive exposure: Never    Smokeless tobacco: Never    Tobacco comments:      smokes 6-8 per day   Vaping Use    Vaping status: Never Used   Substance and Sexual Activity    Alcohol use: Not Currently     Comment: Alcoholic Drinks/day: occasional    Drug use: Never    Sexual activity: Yes     Partners: Female     Birth control/protection: None   Other Topics Concern    Parent/sibling w/ CABG, MI or angioplasty before 65F 55M? No   Social History Narrative    Not on file     Social Determinants of Health     Financial Resource Strain: Low Risk  (12/29/2023)    Financial Resource Strain     Within the past 12 months, have you or your family members you live with been unable to get utilities (heat, electricity) when it was really needed?: No   Food Insecurity: Low Risk  (12/29/2023)    Food Insecurity     Within the past 12 months, did you worry that your food would run out before you got money to buy more?: No     Within the past 12 months, did the food you bought just not last and you didn t have money to get more?: No   Transportation Needs: Low Risk  (12/29/2023)    Transportation Needs     Within the past 12 months, has lack of transportation kept you from medical appointments, getting your medicines, non-medical meetings or appointments, work, or from getting things that you need?: No   Physical Activity: Not on file   Stress: Not on file   Social Connections: Not on file   Interpersonal Safety: Low Risk  (1/18/2024)    Interpersonal Safety     Do you feel physically and emotionally safe where you currently live?: Yes     Within the past 12 months, have you been hit, slapped, kicked or otherwise physically hurt by someone?: No     Within the past 12 months, have you been humiliated or emotionally abused in other ways by your partner or ex-partner?: No   Housing Stability: Low Risk  (12/29/2023)    Housing Stability     Do you have housing? : Yes     Are you worried about losing your housing?: No       Family History:  Family History   Problem Relation Age of Onset    Diabetes Mother      "Cerebrovascular Disease Mother     Hypertension Mother     Cerebrovascular Disease Father     Hypertension Father     Diabetes Father     Diabetes Sister     Hypertension Sister     Hyperlipidemia Sister     Obesity Sister     Diabetes Sister     Hypertension Sister     Cerebrovascular Disease Brother     Other Cancer Other        Review of Systems:   A complete review of systems reviewed by me is negative with the exeption of what has been mentioned in the history of present illness.  In the last TWO WEEKS have you experienced any of the following symptoms?  Fevers: No  Night Sweats: No  Weight Gain: Yes  Pain at Night: Yes  Double Vision: Yes  Changes in Vision: Yes  Difficulty Breathing through Nose: Yes  Sore Throat in Morning: No  Dry Mouth in the Morning: No  Shortness of Breath Lying Flat: Yes  Shortness of Breath With Activity: Yes  Awakening with Shortness of Breath: No  Increased Cough: No  Heart Racing at Night: No  Swelling in Feet or Legs: No  Diarrhea at Night: No  Heartburn at Night: No  Urinating More than Once at Night: Yes  Losing Control of Urine at Night: No  Joint Pains at Night: Yes  Headaches in Morning: No  Weakness in Arms or Legs: No  Depressed Mood: No  Anxiety: No     Physical Exam:   Vitals: /80   Pulse 71   Resp 16   Ht 1.74 m (5' 8.5\")   Wt 108.4 kg (239 lb)   SpO2 97%   BMI 35.81 kg/m    BMI= Body mass index is 35.81 kg/m .  Neck Cir (cm): 43 cm  GENERAL: alert and no distress  EYES: Eyes grossly normal to inspection.  No discharge or erythema, or obvious scleral/conjunctival abnormalities.  RESP: No audible wheeze, cough, or visible cyanosis.    SKIN: Visible skin clear. No significant rash, abnormal pigmentation or lesions.  NEURO: Cranial nerves grossly intact.  Mentation and speech appropriate for age.  PSYCH: Appropriate affect, tone, and pace of words         Data: All pertinent previous laboratory data reviewed     Recent Labs   Lab Test 09/21/23  1107 " "01/23/23  1311    138   POTASSIUM 4.2 3.9   CHLORIDE 102 101   CO2 27 29   ANIONGAP 10 8   * 267*   BUN 17.0 14.5   CR 0.91 1.15   ANNIKA 9.2 9.3       Recent Labs   Lab Test 01/23/23  1311   WBC 6.7   RBC 4.88   HGB 14.9   HCT 44.1   MCV 90   MCH 30.5   MCHC 33.8   RDW 12.7          Recent Labs   Lab Test 09/21/23  1107   PROTTOTAL 7.7   ALBUMIN 4.5   BILITOTAL 1.1   ALKPHOS 86   AST 35   ALT 53       No results found for: \"TSH\"    No results found for: \"UAMP\", \"UBARB\", \"BENZODIAZEUR\", \"UCANN\", \"UCOC\", \"OPIT\", \"UPCP\"    No results found for: \"IRONSAT\", \"[id number]\", \"JORGE\"    No results found for: \"PH\", \"PHARTERIAL\", \"PO2\", \"[id number]\", \"SAT\", \"PCO2\", \"HCO3\", \"BASEEXCESS\", \"JADE\", \"BEB\"    @LABRCNTIPR(phv:4,pco2v:4,po2v:4,hco3v:4,adriana:4,o2per:4)@    Echocardiology: No results found for this or any previous visit (from the past 4320 hour(s)).    Chest x-ray:   No results found for this or any previous visit from the past 365 days.      Chest CT:   No results found for this or any previous visit from the past 365 days.      PFT: Most Recent [facility]ze Pulmonary Function Testing  No results found     Patient was strongly advised to avoid driving, operating any heavy machinery or other hazardous situations while drowsy or sleepy.  Patient was counseled on the importance of driving while alert, to pull over if drowsy, or nap before getting into the vehicle if sleepy.      Plan is for Dalila Malone to follow up following PSG .     The above note was dictated using voice recognition software. Although reviewed after completion, some word and grammatical error may remain . Please contact the author for any clarifications.    Total time spent reviewing medical records, history and physical examination, review of previous testing and interpretation as well as documentation on this date, 05/13/24: 23 minutes    Shahriar Abdul MD on 10/20/2022   [facility] Professional " Building   [address]oor 1, [address]   [address] [address]   Appointments: [phone] [facility]   Floor 1, [address]   [address] [place] [address]   Appointments: [phone]     CC: Elina Banks

## 2024-05-13 NOTE — PATIENT INSTRUCTIONS
"MY INFORMATION ON SLEEP APNEA-  Dalila Malone    DOCTOR : Shahriar Abdul MD  SLEEP CENTER :      MY CONTACT NUMBER:    [facility]   [phone]  [facility]    Am I having a sleep study at a sleep center?  Make sure you have an appointment for the study before you leave!  [email or web address]?[email or web address]    Am I having a home sleep study?  Watch the video for the device you are using:  /drop off device, Noxturnal T-3: https://www.Genoube.com/watch?v=yGGFBdELGhk  Disposable device sent out require phone/computer application, WatchPAT1: https://[email or web address]/watch?v=BCce_vbiwxE  The sleep lab will call you for this appointment   If you wish to reschedule the sleep study or contact the sleep lab scheduling call [phone]        Frequently asked questions:  1. What is Obstructive Sleep Apnea (FLYNN)? FLYNN is the most common type of sleep apnea. Apnea means, \"without breath.\"  Apnea is most often caused by narrowing or collapse of the upper airway as muscles relax during sleep.   Almost everyone has occasional apneas. Most people with sleep apnea have had brief interruptions at night frequently for many years.  The severity of sleep apnea is related to how frequent and severe the events are.   Apneas are any events where there is no breathing.  Hypopneas are any events where there is shallow breathing. Sleep studies will track and then add all of the apneas and hypopneas into a total and then divided this number by the total time asleep to obtain the apnea hypopnea index(AHI). 0-5 events/per hour = No Sleep Apnea; 5-15 events/per hour = Mild Sleep Apnea; 15-30 events/per hour = Moderate Sleep Apnea; Greater than 30 events/per hour = Severe Sleep Apnea.  Sleep apnea is typically worse during REM sleep due to complete muscle relaxation, including the muscles of the airway. Sleep apnea is also typically worse during " supine(sleeping on your back) sleep due to internal structures more easily falling on the top of the airway and external pressures more easily crushing the airway.  The respiratory disturbance index(RDI) includes apneas, hypopneas, and other respiratory events such as snoring that may disturb your sleep.  2. What are the consequences of FLYNN? Symptoms include: feeling sleepy during the day, snoring loudly, gasping or stopping of breathing, trouble sleeping, and occasionally morning headaches or heartburn at night.  Sleepiness can be serious and even increase the risk of falling asleep while driving. Other health consequences may include development of high blood pressure and other cardiovascular disease in persons who are susceptible. Untreated FLYNN  can contribute to heart disease, stroke and diabetes.   3. What are the treatment options? In most situations, sleep apnea is a lifelong disease that must be managed with daily therapy. Medications are not effective for sleep apnea and surgery is generally not considered until other therapies have been tried. Your treatment is your choice . Continuous Positive Airway (CPAP) works right away and is the therapy that is effective in nearly everyone. An oral device to hold your jaw forward is usually the next most reliable option. Other options include postioning devices (to keep you off your back), weight loss, and surgery including a tongue pacing device. There is more detail about some of these options below.    Important tips for using CPAP and similar devices   Know your equipment:  CPAP is continuous positive airway pressure that prevents obstructive sleep apnea by keeping the throat from collapsing while you are sleeping. In most cases, the device is  smart  and can slowly self-adjusts if your throat collapses and keeps a record every day of how well you are treated-this information is available to you and your care team.  BPAP is bilevel positive airway pressure that  keeps your throat open and also assists each breath with a pressure boost to maintain adequate breathing.  Special kinds of BPAP are used in patients who have inadequate breathing from lung or heart disease. In most cases, the device is  smart  and can slowly self-adjusts to assist breathing. Like CPAP, the device keeps a record of how well you are treated.  Your mask is your connection to the device. You get to choose what feels most comfortable and the staff will help to make sure if fits. Here: are some examples of the different masks that are available:       Key points to remember on your journey with sleep apnea:  Sleep study.  PAP devices often need to be adjusted during a sleep study to show that they are effective and adjusted right.  Good tips to remember: Try wearing just the mask during a quiet time during the day so your body adapts to wearing it. A humidifier is recommended for comfort in most cases to prevent drying of your nose and throat. Allergy medication from your provider may help you if you are having nasal congestion.  Getting settled-in. It takes more than one night for most of us to get used to wearing a mask. Try wearing just the mask during a quiet time during the day so your body adapts to wearing it. A humidifier is recommended for comfort in most cases. Our team will work with you carefully on the first day and will be in contact within 4 days and again at 2 and 4 weeks for advice and remote device adjustments. Your therapy is evaluated by the device each day.   Use it every night. The more you are able to sleep naturally for 7-8 hours, the more likely you will have good sleep and to prevent health risks or symptoms from sleep apnea. Even if you use it 4 hours it helps. Occasionally all of us are unable to use a medical therapy, in sleep apnea, it is not dangerous to miss one night.   Communicate. Call our skilled team on the number provided on the first day if your visit for problems  that make it difficult to wear the device. Over 2 out of 3 patients can learn to wear the device long-term with help from our team. Remember to call our team or your sleep providers if you are unable to wear the device as we may have other solutions for those who cannot adapt to mask CPAP therapy. It is recommended that you sleep your sleep provider within the first 3 months and yearly after that if you are not having problems.   Take care of your equipment. Make sure you clean your mask and tubing using directions every day and that your filter and mask are replaced as recommended or if they are not working.     BESIDES CPAP, WHAT OTHER THERAPIES ARE THERE?    Positioning Device  Positioning devices are generally used when sleep apnea is mild and only occurs on your back.This example shows a pillow that straps around the waist. It may be appropriate for those whose sleep study shows milder sleep apnea that occurs primarily when lying flat on one's back. Preliminary studies have shown benefit but effectiveness at home may need to be verified by a home sleep test. These devices are generally not covered by medical insurance.    Oral Appliance  What is oral appliance therapy?  An oral appliance device fits on your teeth at night like a retainer used after having braces. The device is made by a specialized dentist and requires several visits over 1-2 months before a manufactured device is made to fit your teeth and is adjusted to prevent your sleep apnea. Once an oral device is working properly, snoring should be improved. A home sleep test may be recommended at that time if to determine whether the sleep apnea is adequately treated.       Some things to remember:  -Oral devices are often, but not always, covered by your medical insurance. Be sure to check with your insurance provider.   -If you are referred for oral therapy, you will be given a list of specialized dentists to consider or you may choose to visit the  Web site of the American Academy of Dental Sleep Medicine  -Oral devices are less likely to work if you have severe sleep apnea or are extremely overweight.     More detailed information  An oral appliance is a small acrylic device that fits over the upper and lower teeth  (similar to a retainer or a mouth guard). This device slightly moves jaw forward, which moves the base of the tongue forward, opens the airway, improves breathing for effective treat snoring and obstructive sleep apnea in perhaps 7 out of 10 people .  The best working devices are custom-made by a dental device  after a mold is made of the teeth 1, 2, 3.  When is an oral appliance indicated?  Oral appliance therapy is recommended as a first-line treatment for patients with primary snoring, mild sleep apnea, and for patients with moderate sleep apnea who prefer appliance therapy to use of CPAP4, 5. Severity of sleep apnea is determined by sleep testing and is based on the number of respiratory events per hour of sleep.   How successful is oral appliance therapy?  The success rate of oral appliance therapy in patients with mild sleep apnea is 75-80% while in patients with moderate sleep apnea it is 50-70%. The chance of success in patients with severe sleep apnea is 40-50%. The research also shows that oral appliances have a beneficial effect on the cardiovascular health of FLYNN patients at the same magnitude as CPAP therapy7.  Oral appliances should be a second-line treatment in cases of severe sleep apnea, but if not completely successful then a combination therapy utilizing CPAP plus oral appliance therapy may be effective. Oral appliances tend to be effective in a broad range of patients although studies show that the patients who have the highest success are females, younger patients, those with milder disease, and less severe obesity. 3, 6.   Finding a dentist that practices dental sleep medicine  Specific training is available  through the American Academy of Dental Sleep Medicine for dentists interested in working in the field of sleep. To find a dentist who is educated in the field of sleep and the use of oral appliances, near you, visit the Web site of the American Academy of Dental Sleep Medicine.    References  1. Robby et al. Objectively measured vs self-reported compliance during oral appliance therapy for sleep-disordered breathing. Chest 2013; 144(5): 2888-0481.  2. Ros et al. Objective measurement of compliance during oral appliance therapy for sleep-disordered breathing. Thorax 2013; 68(1): 91-96.  3. Mitchell et al. Mandibular advancement devices in 620 men and women with FLYNN and snoring: tolerability and predictors of treatment success. Chest 2004; 125: 0369-3742.  4. Rolando et al. Oral appliances for snoring and FLYNN: a review. Sleep 2006; 29: 244-262.  5. Helen et al. Oral appliance treatment for FLYNN: an update. J Clin Sleep Med 2014; 10(2): 215-227.  6. Roddy et al. Predictors of OSAH treatment outcome. J Dent Res 2007; 86: 6012-3179.      Weight Loss:    Weight loss is a long-term strategy that may improve sleep apnea in some patients.    Weight management is a personal decision.  If you are interested in exploring weight loss strategies, the following discussion covers the impact on weight loss on sleep apnea and the approaches that may be successful.    Weight loss decreases severity of sleep apnea in most people with obesity. For those with mild obesity who have developed snoring with weight gain, even 15-30 pound weight loss can improve and occasionally eliminate sleep apnea.  Structured and life-long dietary and health habits are necessary to lose weight and keep healthier weight levels.     Though there may be significant health benefits from weight loss, long-term weight loss is very difficult to achieve- studies show success with dietary management in less than 10% of people. In addition,  substantial weight loss may require years of dietary control and may be difficult if patients have severe obesity. In these cases, surgical management may be considered.  Finally, older individuals who have tolerated obesity without health complications may be less likely to benefit from weight loss strategies.    Your BMI is Body mass index is 35.81 kg/m .    Weight management plan: Discussed healthy diet and exercise guidelines    Surgery:    Surgery for obstructive sleep apnea is considered generally only when other therapies fail to work. Surgery may be discussed with you if you are having a difficult time tolerating CPAP and or when there is an abnormal structure that requires surgical correction.  Nose and throat surgeries often enlarge the airway to prevent collapse.  Most of these surgeries create pain for 1-2 weeks and up to half of the most common surgeries are not effective throughout life.  You should carefully discuss the benefits and drawbacks to surgery with your sleep provider and surgeon to determine if it is the best solution for you.   More information  Surgery for FLYNN is directed at areas that are responsible for narrowing or complete obstruction of the airway during sleep.  There are a wide range of procedures available to enlarge and/or stabilize the airway to prevent blockage of breathing in the three major areas where it can occur: the palate, tongue, and nasal regions.  Successful surgical treatment depends on the accurate identification of the factors responsible for obstructive sleep apnea in each person.  A personalized approach is required because there is no single treatment that works well for everyone.  Because of anatomic variation, consultation with an examination by a sleep surgeon is a critical first step in determining what surgical options are best for each patient.  In some cases, examination during sedation may be recommended in order to guide the selection of procedures.   Patients will be counseled about risks and benefits as well as the typical recovery course after surgery. Surgery is typically not a cure for a person s FLYNN.  However, surgery will often significantly improve one s FLYNN severity (termed  success rate ).  Even in the absence of a cure, surgery will decrease the cardiovascular risk associated with OSA7; improve overall quality of life8 (sleepiness, functionality, sleep quality, etc).      Palate Procedures:  Patients with FLYNN often have narrowing of their airway in the region of their tonsils and uvula.  The goals of palate procedures are to widen the airway in this region as well as to help the tissues resist collapse.  Modern palate procedure techniques focus on tissue conservation and soft tissue rearrangement, rather than tissue removal.  Often the uvula is preserved in this procedure. Residual sleep apnea is common in patient after pharyngoplasty with an average reduction in sleep apnea events of 33%2.      Tongue Procedures:  ExamWhile patients are awake, the muscles that surround the throat are active and keep this region open for breathing. These muscles relax during sleep, allowing the tongue and other structures to collapse and block breathing.  There are several different tongue procedures available.  Selection of a tongue base procedure depends on characteristics seen on physical exam.  Generally, procedures are aimed at removing bulky tissues in this area or preventing the back of the tongue from falling back during sleep.  Success rates for tongue surgery range from 50-62%3.    Hypoglossal Nerve Stimulation:  Hypoglossal nerve stimulation has recently received approval from the [place] Food and Drug Administration for the treatment of obstructive sleep apnea.  This is based on research showing that the system was safe and effective in treating sleep apnea6.  Results showed that the median AHI score decreased 68%, from 29.3 to 9.0. This therapy uses  an implant system that senses breathing patterns and delivers mild stimulation to airway muscles, which keeps the airway open during sleep.  The system consists of three fully implanted components: a small generator (similar in size to a pacemaker), a breathing sensor, and a stimulation lead.  Using a small handheld remote, a patient turns the therapy on before bed and off upon awakening.    Candidates for this device must be greater than 22 years of age, have moderate to severe FLYNN (AHI between 20-65), BMI less than 32, have tried CPAP/oral appliance without success, and have appropriate upper airway anatomy (determined by a sleep endoscopy performed by Dr. Hall).    Hypoglossal Nerve Stimulation Pathway:    The sleep surgeon s office will work with the patient through the insurance prior-authorization process (including communications and appeals).    Nasal Procedures:  Nasal obstruction can interfere with nasal breathing during the day and night.  Studies have shown that relief of nasal obstruction can improve the ability of some patients to tolerate positive airway pressure therapy for obstructive sleep apnea1.  Treatment options include medications such as nasal saline, topical corticosteroid and antihistamine sprays, and oral medications such as antihistamines or decongestants. Non-surgical treatments can include external nasal dilators for selected patients. If these are not successful by themselves, surgery can improve the nasal airway either alone or in combination with these other options.      Combination Procedures:  Combination of surgical procedures and other treatments may be recommended, particularly if patients have more than one area of narrowing or persistent positional disease.  The success rate of combination surgery ranges from 66-80%2,3.    References  Samuel GOLDSTEIN. The Role of the Nose in Snoring and Obstructive Sleep Apnoea: An Update.  Eur Arch Otorhinolaryngol. 2011; 268: 1365-73.   Richard  SM; Linnea JA; Nuria JR; Pallanch JF; Khari MB; Pedro Luis SG; Brenda ORTEGA. Surgical modifications of the upper airway for obstructive sleep apnea in adults: a systematic review and meta-analysis. SLEEP 2010;33(10):5941-7917. Emperatriz CORRAL. Hypopharyngeal surgery in obstructive sleep apnea: an evidence-based medicine review.  Arch Otolaryngol Head Neck Surg. 2006 Feb;132(2):206-13.  Mauro YH1, Ramsey Y, Keith BETZAIDA. The efficacy of anatomically based multilevel surgery for obstructive sleep apnea. Otolaryngol Head Neck Surg. 2003 Oct;129(4):327-35.  Emperatriz CORRAL, Goldberg A. Hypopharyngeal Surgery in Obstructive Sleep Apnea: An Evidence-Based Medicine Review. Arch Otolaryngol Head Neck Surg. 2006 Feb;132(2):206-13.  Mirna PJ et al. Upper-Airway Stimulation for Obstructive Sleep Apnea.  N Engl J Med. 2014 Jan 9;370(2):139-49.  Kia Y et al. Increased Incidence of Cardiovascular Disease in Middle-aged Men with Obstructive Sleep Apnea. Am J Respir Crit Care Med; 2002 166: 159-165  Bedolla EM et al. Studying Life Effects and Effectiveness of Palatopharyngoplasty (SLEEP) study: Subjective Outcomes of Isolated Uvulopalatopharyngoplasty. Otolaryngol Head Neck Surg. 2011; 144: 623-631.

## 2024-05-13 NOTE — ASSESSMENT & PLAN NOTE
STOP BANG score is 7/8. Patient likely has sleep apnea based on clinical history of snoring, EDS(ESS 10/24), witnessed apneas, BMI(35.8), neck circumference(4 cm), gender, insomnia(NIECY 15/28), snort arousals, nocturia, morning headaches, sleep inertia, and unrefreshing sleep.   Obstructive sleep apnea reviewed. Complications of Untreated Sleep Apnea Reviewed.  HST/ Polysomnography reviewed. Information provided regarding treatment options for FLYNN.  Recommend in-lab sleep study to assess for sleep disordered breathing due to high risk for FLYNN

## 2024-05-14 ENCOUNTER — TRANSCRIBE ORDERS (OUTPATIENT)
Dept: OTHER | Age: 49
End: 2024-05-14

## 2024-05-14 DIAGNOSIS — M17.11 PRIMARY OSTEOARTHRITIS OF RIGHT KNEE: Primary | ICD-10-CM

## 2024-05-18 ASSESSMENT — ACTIVITIES OF DAILY LIVING (ADL)
KNEE_ACTIVITY_OF_DAILY_LIVING_SCORE: 45.71
HOW_WOULD_YOU_RATE_THE_OVERALL_FUNCTION_OF_YOUR_KNEE_DURING_YOUR_USUAL_DAILY_ACTIVITIES?: ABNORMAL
SQUAT: ACTIVITY IS FAIRLY DIFFICULT
STIFFNESS: THE SYMPTOM AFFECTS MY ACTIVITY SEVERELY
AS_A_RESULT_OF_YOUR_KNEE_INJURY,_HOW_WOULD_YOU_RATE_YOUR_CURRENT_LEVEL_OF_DAILY_ACTIVITY?: SEVERELY ABNORMAL
SIT WITH YOUR KNEE BENT: ACTIVITY IS MINIMALLY DIFFICULT
GIVING WAY, BUCKLING OR SHIFTING OF KNEE: THE SYMPTOM AFFECTS MY ACTIVITY SLIGHTLY
SQUAT: ACTIVITY IS FAIRLY DIFFICULT
WALK: ACTIVITY IS FAIRLY DIFFICULT
SIT WITH YOUR KNEE BENT: ACTIVITY IS MINIMALLY DIFFICULT
PAIN: THE SYMPTOM PREVENTS ME FROM ALL DAILY ACTIVITIES
WEAKNESS: THE SYMPTOM AFFECTS MY ACTIVITY SLIGHTLY
GO DOWN STAIRS: ACTIVITY IS FAIRLY DIFFICULT
HOW_WOULD_YOU_RATE_THE_OVERALL_FUNCTION_OF_YOUR_KNEE_DURING_YOUR_USUAL_DAILY_ACTIVITIES?: ABNORMAL
GIVING WAY, BUCKLING OR SHIFTING OF KNEE: THE SYMPTOM AFFECTS MY ACTIVITY SLIGHTLY
KNEEL ON THE FRONT OF YOUR KNEE: ACTIVITY IS VERY DIFFICULT
PLEASE_INDICATE_YOR_PRIMARY_REASON_FOR_REFERRAL_TO_THERAPY:: KNEE
AS_A_RESULT_OF_YOUR_KNEE_INJURY,_HOW_WOULD_YOU_RATE_YOUR_CURRENT_LEVEL_OF_DAILY_ACTIVITY?: SEVERELY ABNORMAL
WALK: ACTIVITY IS FAIRLY DIFFICULT
PAIN: THE SYMPTOM PREVENTS ME FROM ALL DAILY ACTIVITIES
STAND: ACTIVITY IS FAIRLY DIFFICULT
STIFFNESS: THE SYMPTOM AFFECTS MY ACTIVITY SEVERELY
KNEE_ACTIVITY_OF_DAILY_LIVING_SUM: 32
GO DOWN STAIRS: ACTIVITY IS FAIRLY DIFFICULT
RAW_SCORE: 32
WEAKNESS: THE SYMPTOM AFFECTS MY ACTIVITY SLIGHTLY
GO UP STAIRS: ACTIVITY IS FAIRLY DIFFICULT
RISE FROM A CHAIR: ACTIVITY IS MINIMALLY DIFFICULT
KNEEL ON THE FRONT OF YOUR KNEE: ACTIVITY IS VERY DIFFICULT
GO UP STAIRS: ACTIVITY IS FAIRLY DIFFICULT
SWELLING: I DO NOT HAVE THE SYMPTOM
LIMPING: THE SYMPTOM AFFECTS MY ACTIVITY SEVERELY
RISE FROM A CHAIR: ACTIVITY IS MINIMALLY DIFFICULT
STAND: ACTIVITY IS FAIRLY DIFFICULT
SWELLING: I DO NOT HAVE THE SYMPTOM
LIMPING: THE SYMPTOM AFFECTS MY ACTIVITY SEVERELY

## 2024-05-21 ENCOUNTER — MYC MEDICAL ADVICE (OUTPATIENT)
Dept: FAMILY MEDICINE | Facility: CLINIC | Age: 49
End: 2024-05-21
Payer: COMMERCIAL

## 2024-05-22 NOTE — TELEPHONE ENCOUNTER
Called patient responding to [facility] message requesting a pre-op appointment with pcp for Rt knee surgery scheduled for 7/9/2024.  Able to secure an appt with pcp for 6/24/24.  Date, time and location confirmed with patient.    Hung Malone RN  [facility]

## 2024-05-23 ENCOUNTER — THERAPY VISIT (OUTPATIENT)
Dept: PHYSICAL THERAPY | Facility: CLINIC | Age: 49
End: 2024-05-23
Attending: STUDENT IN AN ORGANIZED HEALTH CARE EDUCATION/TRAINING PROGRAM
Payer: COMMERCIAL

## 2024-05-23 DIAGNOSIS — M25.561 CHRONIC PAIN OF RIGHT KNEE: Primary | ICD-10-CM

## 2024-05-23 DIAGNOSIS — G89.29 CHRONIC PAIN OF RIGHT KNEE: Primary | ICD-10-CM

## 2024-05-23 PROCEDURE — 97161 PT EVAL LOW COMPLEX 20 MIN: CPT | Mod: GP | Performed by: PHYSICAL THERAPIST

## 2024-05-23 PROCEDURE — 97110 THERAPEUTIC EXERCISES: CPT | Mod: GP | Performed by: PHYSICAL THERAPIST

## 2024-05-23 NOTE — PROGRESS NOTES
PHYSICAL THERAPY EVALUATION  Type of Visit: Evaluation    See electronic medical record for Abuse and Falls Screening details.    Subjective       Presenting condition or subjective complaint: For knee pain and after surgery care    The patient is planning to undergo surgery on 7/9/24. The patient was advised to come to PT to do a little bit of exercises before undergoing surgery. Patient will undergo a R TKA. The patient drives school buses. Has pain with walking, stairs, sleeping.     Date of onset: 05/08/24 (MD order)    Relevant medical history:     Dates & types of surgery:      Prior diagnostic imaging/testing results: X-ray     Prior therapy history for the same diagnosis, illness or injury: No      Living Environment  Social support: With a significant other or spouse   Type of home: House; 1 level   Stairs to enter the home: Yes 3 Is there a railing: Yes   Ramp: No   Stairs inside the home: No       Help at home: Home management tasks (cooking, cleaning); Home and Yard maintenance tasks  Equipment owned: Straight Cane; Crutches     Employment: Yes Trainer/  Hobbies/Interests:      Patient goals for therapy: Everything normal with my right knee.    Pain assessment:      Objective     KNEE:    Gait: in knee brace, very antalgic, decreased knee flexion and lacks TKE, varus knee alignment.           AROM: (* indicates patient's pain)   PROM:(over pressure)   L  R L R   Hyperextension         Extension    Lacking 8 deg P+  Lacking 3 deg P+    Flexion    100 deg P+   105 deg p+      Strength:   L R   HIP     Flex  5   Ext  4+   ABd  4+   KNEE     Flex  5   Ext 5 4 OK quad set P+ with SLR      Palpation: tender to infrapatellar region, quad tendon     Assessment & Plan   CLINICAL IMPRESSIONS  Medical Diagnosis: Right Knee Pain    Treatment Diagnosis: Right Knee Pain   Impression/Assessment: Patient is a 49 year old male with knee complaints.  The following significant findings have been  identified: Pain, Decreased ROM/flexibility, Decreased joint mobility, Decreased strength, Inflammation, Impaired gait, Impaired muscle performance, and Decreased activity tolerance. These impairments interfere with their ability to perform self care tasks, work tasks, recreational activities, household chores, driving , household mobility, and community mobility as compared to previous level of function.     Clinical Decision Making (Complexity):  Clinical Presentation: Stable/Uncomplicated  Clinical Presentation Rationale: based on medical and personal factors listed in PT evaluation  Clinical Decision Making (Complexity): Low complexity    PLAN OF CARE  Treatment Interventions:  Modalities: Cryotherapy, E-stim, Hot Pack  Interventions: Gait Training, Manual Therapy, Neuromuscular Re-education, Therapeutic Activity, Therapeutic Exercise, Self-Care/Home Management    Long Term Goals     PT Goal 1  Goal Identifier: HEP  Goal Description: The patient will demonstrate independence and compliance with prescribed HEP in order to improve pain and functionality.  Target Date: 06/20/24      Frequency of Treatment: 1x/week  Duration of Treatment: 4 weeks    Recommended Referrals to Other Professionals:   Education Assessment:   Learner/Method: Patient  Education Comments: Eager to participate in therapy    Risks and benefits of evaluation/treatment have been explained.   Patient/Family/caregiver agrees with Plan of Care.     Evaluation Time:     PT Eval, Low Complexity Minutes (88578): 15       Signing Clinician: Trang Snow, PT    [facility]                                                                                   OUTPATIENT PHYSICAL THERAPY    PLAN OF TREATMENT FOR OUTPATIENT REHABILITATION   Patient's Last Name, First Name, Dalila Lynne YOB: 1975   Provider's Name   [facility]   Medical Record No.  [id number]     Onset  Date: 05/08/24 (MD order)  Start of Care Date: 05/23/24     Medical Diagnosis:  Right Knee Pain      PT Treatment Diagnosis:  Right Knee Pain Plan of Treatment  Frequency/Duration: 1x/week/ 4 weeks    Certification date from 05/23/24 to 06/20/24         See note for plan of treatment details and functional goals     Trang Snow, PT                         I CERTIFY THE NEED FOR THESE SERVICES FURNISHED UNDER        THIS PLAN OF TREATMENT AND WHILE UNDER MY CARE .             Physician Signature               Date    X_____________________________________________________                  Referring Provider:  Mika Henry    Initial Assessment  See Epic Evaluation- Start of Care Date: 05/23/24

## 2024-05-24 ASSESSMENT — SLEEP AND FATIGUE QUESTIONNAIRES

## 2024-05-30 ENCOUNTER — THERAPY VISIT (OUTPATIENT)
Dept: SLEEP MEDICINE | Facility: CLINIC | Age: 49
End: 2024-05-30
Attending: STUDENT IN AN ORGANIZED HEALTH CARE EDUCATION/TRAINING PROGRAM
Payer: COMMERCIAL

## 2024-05-30 DIAGNOSIS — G47.33 OBSTRUCTIVE SLEEP APNEA: ICD-10-CM

## 2024-05-31 NOTE — PATIENT INSTRUCTIONS
[facility]    1. Your sleep study will be reviewed by a sleep physician within the next few days.     2. Please follow up in the sleep clinic as scheduled, or, make an appointment with your sleep provider to be seen within two weeks to discuss the results of the sleep study.    3. If you have any questions or problems with your treatment plan, please contact your sleep clinic provider at [phone] to further manage your condition.    4. Please review your attached medication list, and, at your follow-up appointment advise your sleep clinic provider about any changes.    5. Go to [email or web address] for more information about your sleep problems.    Faina Moreno, RPSGT  May 31, 2024

## 2024-06-05 DIAGNOSIS — E11.9 TYPE 2 DIABETES MELLITUS WITHOUT COMPLICATION, WITHOUT LONG-TERM CURRENT USE OF INSULIN (H): ICD-10-CM

## 2024-06-06 RX ORDER — EMPAGLIFLOZIN 25 MG/1
25 TABLET, FILM COATED ORAL DAILY
Qty: 90 TABLET | Refills: 0 | Status: SHIPPED | OUTPATIENT
Start: 2024-06-06 | End: 2024-08-09

## 2024-06-11 ENCOUNTER — TRANSFERRED RECORDS (OUTPATIENT)
Dept: MULTI SPECIALTY CLINIC | Facility: CLINIC | Age: 49
End: 2024-06-11
Payer: COMMERCIAL

## 2024-06-11 LAB — RETINOPATHY: NORMAL

## 2024-06-24 ENCOUNTER — MYC MEDICAL ADVICE (OUTPATIENT)
Dept: FAMILY MEDICINE | Facility: CLINIC | Age: 49
End: 2024-06-24

## 2024-06-24 ENCOUNTER — OFFICE VISIT (OUTPATIENT)
Dept: FAMILY MEDICINE | Facility: CLINIC | Age: 49
End: 2024-06-24
Payer: COMMERCIAL

## 2024-06-24 VITALS
HEIGHT: 69 IN | OXYGEN SATURATION: 96 % | TEMPERATURE: 98.2 F | WEIGHT: 233 LBS | BODY MASS INDEX: 34.51 KG/M2 | RESPIRATION RATE: 16 BRPM | HEART RATE: 80 BPM | DIASTOLIC BLOOD PRESSURE: 79 MMHG | SYSTOLIC BLOOD PRESSURE: 120 MMHG

## 2024-06-24 DIAGNOSIS — Z01.818 PREOP GENERAL PHYSICAL EXAM: Primary | ICD-10-CM

## 2024-06-24 DIAGNOSIS — G47.33 OBSTRUCTIVE SLEEP APNEA: ICD-10-CM

## 2024-06-24 DIAGNOSIS — E11.9 TYPE 2 DIABETES MELLITUS WITHOUT COMPLICATION, WITHOUT LONG-TERM CURRENT USE OF INSULIN (H): ICD-10-CM

## 2024-06-24 DIAGNOSIS — M17.30 POST-TRAUMATIC OSTEOARTHRITIS OF KNEE, UNSPECIFIED LATERALITY: ICD-10-CM

## 2024-06-24 PROBLEM — E66.01 SEVERE OBESITY (BMI 35.0-35.9 WITH COMORBIDITY) (H): Status: RESOLVED | Noted: 2022-07-06 | Resolved: 2024-06-24

## 2024-06-24 LAB
ERYTHROCYTE [DISTWIDTH] IN BLOOD BY AUTOMATED COUNT: 13 % (ref 10–15)
HCT VFR BLD AUTO: 44.3 % (ref 40–53)
HGB BLD-MCNC: 15.1 G/DL (ref 13.3–17.7)
MCH RBC QN AUTO: 30.5 PG (ref 26.5–33)
MCHC RBC AUTO-ENTMCNC: 34.1 G/DL (ref 31.5–36.5)
MCV RBC AUTO: 90 FL (ref 78–100)
PLATELET # BLD AUTO: 245 10E3/UL (ref 150–450)
RBC # BLD AUTO: 4.95 10E6/UL (ref 4.4–5.9)
WBC # BLD AUTO: 8.4 10E3/UL (ref 4–11)

## 2024-06-24 PROCEDURE — 85027 COMPLETE CBC AUTOMATED: CPT | Performed by: FAMILY MEDICINE

## 2024-06-24 PROCEDURE — 99214 OFFICE O/P EST MOD 30 MIN: CPT | Performed by: FAMILY MEDICINE

## 2024-06-24 PROCEDURE — 80061 LIPID PANEL: CPT | Performed by: FAMILY MEDICINE

## 2024-06-24 PROCEDURE — 80048 BASIC METABOLIC PNL TOTAL CA: CPT | Performed by: FAMILY MEDICINE

## 2024-06-24 PROCEDURE — 36415 COLL VENOUS BLD VENIPUNCTURE: CPT | Performed by: FAMILY MEDICINE

## 2024-06-24 PROCEDURE — 93005 ELECTROCARDIOGRAM TRACING: CPT | Performed by: FAMILY MEDICINE

## 2024-06-24 RX ORDER — LANCETS
EACH MISCELLANEOUS
Qty: 100 EACH | Refills: 6 | Status: SHIPPED | OUTPATIENT
Start: 2024-06-24

## 2024-06-24 RX ORDER — IBUPROFEN 800 MG/1
800 TABLET, FILM COATED ORAL EVERY 8 HOURS PRN
Qty: 90 TABLET | Refills: 11 | Status: SHIPPED | OUTPATIENT
Start: 2024-06-24 | End: 2024-08-09

## 2024-06-24 RX ORDER — ACETAMINOPHEN 500 MG
500-1000 TABLET ORAL EVERY 6 HOURS PRN
Qty: 100 TABLET | Refills: 3 | Status: SHIPPED | OUTPATIENT
Start: 2024-06-24 | End: 2024-08-09

## 2024-06-24 NOTE — PATIENT INSTRUCTIONS

## 2024-06-24 NOTE — PROGRESS NOTES
Preoperative Evaluation  [address]  [place] [address]  Phone: [phone]  Fax: [phone]  Primary Provider: Elina Banks MD  Pre-op Performing Provider: Elina Banks MD  Jun 24, 2024 6/19/2024   Surgical Information   What procedure is being done? Right knee replacement   Facility or Hospital where procedure/surgery will be performed: [facility]   Who is doing the procedure / surgery? DR Henry   Date of surgery / procedure: July 9th, 2024   Time of surgery / procedure: TBD   Where do you plan to recover after surgery? at home with family        Fax number for surgical facility: [phone]    Assessment & Plan     The proposed surgical procedure is considered INTERMEDIATE risk.    Preop general physical exam: EKG normal. Labs pending. At this time, ok to proceed with surgery as scheduled.   - CBC with platelets  - Basic metabolic panel  (Ca, Cl, CO2, Creat, Gluc, K, Na, BUN)  - EKG 12-lead, tracing only    Type 2 diabetes mellitus without complication, without long-term current use of insulin (H): last A1C was 7.0  on 5/12. Diabetes is well-controlled.   - thin (NO BRAND SPECIFIED) lancets  Dispense: 100 each; Refill: 6  - blood glucose (NO BRAND SPECIFIED) test strip  Dispense: 100 strip; Refill: 6    Obstructive sleep apnea: had recent sleep consult and had sleep study in May but results not finalized yet.  He very likely has sleep apnea based on STOP-BANG score of 7/8.     Post-traumatic osteoarthritis of knee, unspecified laterality: ordered the following.   - Walker Order  - Bath Seat/Shower Chair Order for DME - ONLY FOR DME         - No identified additional risk factors other than previously addressed    Preoperative Medication Instructions  Antiplatelet or Anticoagulation Medication Instructions   - Patient is on no antiplatelet or anticoagulation medications.    Additional Medication Instructions  Hold jardiance for 3  days before surgery    Recommendation  Approval given to proceed with proposed procedure, without further diagnostic evaluation.    Aruna   Heu is a 49 year old, presenting for the following:  Pre-Op Exam (Right knee replacement) and Refill Request (Ibuprofen )          6/24/2024     3:29 PM   Additional Questions   Roomed by gucci delgado   Accompanied by son and wife         6/24/2024   Forms   Any forms needing to be completed Yes        Via the Health Maintenance questionnaire, the patient has reported the following services have been completed -Eye Exam: Eye exam in [place] 2024-06-11, this information has been sent to the abstraction team.  HPI related to upcoming procedure: will be having right knee replaced.             6/19/2024   Pre-Op Questionnaire   Have you ever had a heart attack or stroke? No   Have you ever had surgery on your heart or blood vessels, such as a stent placement, a coronary artery bypass, or surgery on an artery in your head, neck, heart, or legs? No   Do you have chest pain with activity? No   Do you have a history of heart failure? No   Do you currently have a cold, bronchitis or symptoms of other infection? No   Do you have a cough, shortness of breath, or wheezing? No   Do you or anyone in your family have previous history of blood clots? No   Do you or does anyone in your family have a serious bleeding problem such as prolonged bleeding following surgeries or cuts? No   Have you ever had problems with anemia or been told to take iron pills? No   Have you had any abnormal blood loss such as black, tarry or bloody stools? No   Have you ever had a blood transfusion? No   Are you willing to have a blood transfusion if it is medically needed before, during, or after your surgery? Yes   Have you or any of your relatives ever had problems with anesthesia? No   Do you have sleep apnea, excessive snoring or daytime drowsiness? (!) YES   Do you have a CPAP machine? (!) NO- just got diagnosed  recently.    Do you have any artifical heart valves or other implanted medical devices like a pacemaker, defibrillator, or continuous glucose monitor? No   Do you have artificial joints? No   Are you allergic to latex? No        Health Care Directive  Patient does not have a Health Care Directive or Living Will: Discussed advance care planning with patient; information given to patient to review.      Patient Active Problem List    Diagnosis Date Noted    Chronic pain of right knee 05/23/2024     Priority: Medium    Obstructive sleep apnea 05/13/2024     Priority: Medium    Post-traumatic osteoarthritis of knee, unspecified laterality 01/04/2024     Priority: Medium    Type 2 diabetes mellitus without complication, without long-term current use of insulin (H) 10/03/2023     Priority: Medium    Severe obesity (BMI 35.0-35.9 with comorbidity) (H) 07/06/2022     Priority: Medium     7/6/2022 BMI 37.4      Prediabetes 09/16/2021     Priority: Medium     9/14/21: A1C 6.3      Sacroiliac joint pain 07/25/2019     Priority: Medium    Benign essential hypertension 07/19/2019     Priority: Medium      Past Medical History:   Diagnosis Date    Arthritis 02/04/2021    Knees, elbows, and ankle hurt.    Diabetes (H)     Hypertension 2/12/2019    Medical history non-contributory     Uncomplicated asthma 09/01/2021    Tightness in the chest     Past Surgical History:   Procedure Laterality Date    NO PAST SURGERIES       Current Outpatient Medications   Medication Sig Dispense Refill    acetaminophen (TYLENOL) 500 MG tablet Take 1,000 mg by mouth      albuterol (PROVENTIL HFA) 108 (90 Base) MCG/ACT inhaler Inhale 2 puffs into the lungs every 4 hours as needed for shortness of breath / dyspnea or wheezing 18 g 1    albuterol (VENTOLIN HFA) 108 (90 Base) MCG/ACT inhaler INHALE 2 PUFFS INTO THE LUNGS EVERY 6 HOURS AS NEEDED FOR SHORTNESS OF BREATH OR WHEEZING OR COUGH 18 g 1    aspirin-acetaminophen-caffeine (EXCEDRIN MIGRAINE)  250-250-65 MG tablet Take 2 tablets by mouth daily as needed for headaches 60 tablet 1    blood glucose (NO BRAND SPECIFIED) test strip Use to test blood sugar 1 times daily or as directed. To accompany: Blood Glucose Monitor Brands: per insurance. 100 strip 6    blood glucose monitoring (NO BRAND SPECIFIED) meter device kit Use to test blood sugar 1 times daily or as directed. Preferred blood glucose meter OR supplies to accompany: Blood Glucose Monitor Brands: per insurance. 1 kit 0    fluticasone (FLONASE) 50 MCG/ACT nasal spray Spray 2 sprays into both nostrils daily      JARDIANCE 25 MG TABS tablet TAKE 1 TABLET(25 MG) BY MOUTH DAILY 90 tablet 0    losartan (COZAAR) 25 MG tablet Take 1 tablet (25 mg) by mouth daily 90 tablet 3    montelukast (SINGULAIR) 10 MG tablet TAKE 1 TABLET(10 MG) BY MOUTH AT BEDTIME 30 tablet 11    pravastatin (PRAVACHOL) 20 MG tablet Take 1 tablet (20 mg) by mouth daily 90 tablet 3    thin (NO BRAND SPECIFIED) lancets Use with lanceting device. To accompany: Blood Glucose Monitor Brands: per insurance. 100 each 6    cyclobenzaprine (FLEXERIL) 5 MG tablet Take 1 tablet (5 mg) by mouth nightly as needed for muscle spasms (Patient not taking: Reported on 2024) 30 tablet 1    meclizine (ANTIVERT) 12.5 MG tablet Take 1 tablet (12.5 mg) by mouth 4 times daily as needed for dizziness (Patient not taking: Reported on 2024) 30 tablet 3       No Known Allergies     Social History     Tobacco Use    Smoking status: Former     Current packs/day: 0.00     Average packs/day: 0.5 packs/day for 25.6 years (12.8 ttl pk-yrs)     Types: Cigarettes     Start date: 1996     Quit date: 2021     Years since quittin.8     Passive exposure: Never    Smokeless tobacco: Never    Tobacco comments:     smokes 6-8 per day   Substance Use Topics    Alcohol use: Not Currently     Comment: Alcoholic Drinks/day: occasional     Family History   Problem Relation Age of Onset    Diabetes Mother      "Cerebrovascular Disease Mother     Hypertension Mother     Cerebrovascular Disease Father     Hypertension Father     Diabetes Father     Diabetes Sister     Hypertension Sister     Hyperlipidemia Sister     Obesity Sister     Diabetes Sister     Hypertension Sister     Cerebrovascular Disease Brother     Other Cancer Other      History   Drug Use Unknown             Review of Systems  Constitutional, HEENT, cardiovascular, pulmonary, gi and gu systems are negative, except as otherwise noted.    Objective    /79   Pulse 80   Temp 98.2  F (36.8  C) (Oral)   Resp 16   Ht 1.74 m (5' 8.5\")   Wt 105.7 kg (233 lb)   SpO2 96%   BMI 34.91 kg/m     Estimated body mass index is 34.91 kg/m  as calculated from the following:    Height as of this encounter: 1.74 m (5' 8.5\").    Weight as of this encounter: 105.7 kg (233 lb).  Vitals reviewed. Nursing note reviewed.  General Appearance: Pleasant and alert, in no acute distress  HEENT: mucous membranes moist  CV: RRR, no murmur, rubs, gallops  Resp: No respiratory distress. Clear to auscultation bilaterally. No wheezes, rales, rhonchi  Abd: Soft, nontender, nondistended, bowel sounds present. No masses.  Ext: No peripheral edema, good distal perfusion. Brace on right knee.   Skin: warm, dry, intact, no rash noted  Neuro: no focal deficits, CNs II-XII normal.   Psych: mood and affect are normal.    Elina Banks MD        "

## 2024-06-24 NOTE — PROGRESS NOTES
DME (Durable Medical Equipment) Orders and Documentation  Orders Placed This Encounter   Procedures     Walker Order        The patient was assessed and it was determined the patient is in need of the following listed DME Supplies/Equipment. Please complete supporting documentation below to demonstrate medical necessity.

## 2024-06-25 ENCOUNTER — MYC MEDICAL ADVICE (OUTPATIENT)
Dept: FAMILY MEDICINE | Facility: CLINIC | Age: 49
End: 2024-06-25
Payer: COMMERCIAL

## 2024-06-25 LAB
ANION GAP SERPL CALCULATED.3IONS-SCNC: 9 MMOL/L (ref 7–15)
BUN SERPL-MCNC: 13.7 MG/DL (ref 6–20)
CALCIUM SERPL-MCNC: 9.2 MG/DL (ref 8.6–10)
CHLORIDE SERPL-SCNC: 104 MMOL/L (ref 98–107)
CHOLEST SERPL-MCNC: 229 MG/DL
CREAT SERPL-MCNC: 0.95 MG/DL (ref 0.67–1.17)
DEPRECATED HCO3 PLAS-SCNC: 27 MMOL/L (ref 22–29)
EGFRCR SERPLBLD CKD-EPI 2021: >90 ML/MIN/1.73M2
FASTING STATUS PATIENT QL REPORTED: NO
FASTING STATUS PATIENT QL REPORTED: NO
GLUCOSE SERPL-MCNC: 105 MG/DL (ref 70–99)
HDLC SERPL-MCNC: 31 MG/DL
LDLC SERPL CALC-MCNC: ABNORMAL MG/DL
NONHDLC SERPL-MCNC: 198 MG/DL
POTASSIUM SERPL-SCNC: 4.4 MMOL/L (ref 3.4–5.3)
SODIUM SERPL-SCNC: 140 MMOL/L (ref 135–145)
TRIGL SERPL-MCNC: 441 MG/DL

## 2024-06-26 ENCOUNTER — MYC MEDICAL ADVICE (OUTPATIENT)
Dept: FAMILY MEDICINE | Facility: CLINIC | Age: 49
End: 2024-06-26
Payer: COMMERCIAL

## 2024-06-26 DIAGNOSIS — E78.1 HYPERTRIGLYCERIDEMIA: Primary | ICD-10-CM

## 2024-06-26 RX ORDER — PRAVASTATIN SODIUM 40 MG
40 TABLET ORAL DAILY
Qty: 90 TABLET | Refills: 3 | Status: SHIPPED | OUTPATIENT
Start: 2024-06-26

## 2024-06-26 NOTE — TELEPHONE ENCOUNTER
Will route encounter to provider for an update patient agreed to take Pravastatin 40 mg via [employer] message. RN advised patient to take 2 tabs of Pravastatin 20 mg until provider send in the correct dose.    Hung Malone RN  [facility]

## 2024-07-01 ENCOUNTER — MYC MEDICAL ADVICE (OUTPATIENT)
Dept: FAMILY MEDICINE | Facility: CLINIC | Age: 49
End: 2024-07-01
Payer: COMMERCIAL

## 2024-07-01 LAB — SLPCOMP: NORMAL

## 2024-07-01 NOTE — PROCEDURES
" SLEEP STUDY INTERPRETATION  DIAGNOSTIC POLYSOMNOGRAPHY REPORT      Patient: GUILLE KAMINSKI  YOB: 1975  Study Date: 5/30/2024  MRN: [id number]  Referring Provider: Elina Banks MD  Ordering Provider: Shahriar Abdul MD    Indications for Polysomnography: The patient is a 49 year old Male who is 5' 9\" and weighs 239.0 lbs. His BMI is 35.8, [place] sleepiness scale 10 and neck circumference is 45cm cm. Relevant medical history includes HTN, T2DM, and HLD. A diagnostic polysomnogram was performed to evaluate for sleep apnea/PLMS/RLS/RBD/S-S/CSA/hypoventilation/hypoxemia.    Polysomnogram Data: A full night polysomnogram recorded the standard physiologic parameters including EEG, EOG, EMG, ECG, nasal and oral airflow. Respiratory parameters of chest and abdominal movements were recorded with respiratory inductance plethysmography. Oxygen saturation was recorded by pulse oximetry. Hypopnea scoring rule used: 1B 4%.    Sleep Architecture: Sleep was fragmented with increased arousal index.  Sleep efficiency was decreased due to a prolonged sleep latency and a prolonged wake after sleep onset.  All stages of sleep were observed.  The total recording time of the polysomnogram was 456.3 minutes. The total sleep time was 309.0 minutes. Sleep latency was increased at 41.4 minutes without the use of a sleep aid. REM latency was 57.5 minutes. Arousal index was increased at 34.8 arousals per hour. Sleep efficiency was decreased at 67.7%. Wake after sleep onset was 106.0 minutes. The patient spent 4.9% of total sleep time in Stage N1, 57.0% in Stage N2, 18.0% in Stage N3, and 20.2% in REM. Time in REM supine was - minutes.    Respiration: Moderate obstructive sleep apnea and sleep associated hypoxemia were observed.  Events ? The polysomnogram revealed a presence of 41 obstructive, - central, and - mixed apneas resulting in an apnea index of 8.0 events per hour. There were 96 obstructive hypopneas and - central " hypopneas resulting in an obstructive hypopnea index of 18.6 and central hypopnea index of - events per hour. The combined apnea/hypopnea index was 26.6 events per hour (central apnea/hypopnea index was - events per hour). The REM AHI was 37.4 events per hour. The supine AHI was 46.7 events per hour. The RERA index was 4.7 events per hour.  The RDI was 31.3 events per hour.  Snoring - was reported as loud.  Respiratory rate and pattern - was notable for normal respiratory rate and pattern.  Sustained Sleep Associated Hypoventilation - Transcutaneous carbon dioxide monitoring was not used.  Sleep Associated Hypoxemia - (Greater than 5 minutes O2 sat at or below 88%) was not present. Baseline oxygen saturation was 91.7%. Lowest oxygen saturation was 71.0%. Time spent less than or equal to 88% was 26.2 minutes. Time spent less than or equal to 89% was 36.9 minutes.    Movement Activity: There was a mild elevation in PLM and PLM related arousals.  Periodic Limb Activity - There were 59 PLMs during the entire study. The PLM index was 11.5 movements per hour. The PLM Arousal Index was 6.2 per hour.  REM EMG Activity - Excessive transient/sustained muscle activity was not present.  Nocturnal Behavior - Abnormal sleep related behaviors were not noted during/arising out of NREM / REM sleep.  Bruxism - None apparent.    Cardiac Summary: There were occasional PVCs and PACs observed; however, there were no sustained arrhythmias.  Normal sinus rhythm.  The average pulse rate was 59.9 bpm. The minimum pulse rate was 48.0 bpm while the maximum pulse rate was 82.0 bpm.  Arrhythmias were noted.          Assessment:   Sleep was fragmented with increased arousal index.  Sleep efficiency was decreased due to a prolonged sleep latency and a prolonged wake after sleep onset.  All stages of sleep were observed.  Moderate obstructive sleep apnea and sleep associated hypoxemia were observed.  There was a mild elevation in PLM and PLM  related arousals.  There were occasional PVCs and PACs observed; however, there were no sustained arrhythmias.  Normal sinus rhythm.      Recommendations:  Based on the presence of moderate obstructive sleep apnea and excessive daytime sleepiness, treatment could be empirically initiated with Auto?titrating PAP therapy with a range of 05 to 15 cmH2O. Recommend clinical follow up with sleep management team.  Patient may be a candidate for dental appliance through referral to Sleep Dentistry for the treatment of obstructive sleep apnea and/or socially disruptive snoring.  If devices are not acceptable or effective, patient may benefit from evaluation of possible surgical options. If he is interested, would recommend referral to specialized ENT-Sleep provider.  Advice regarding the risks of drowsy driving.  Suggest optimizing sleep schedule and avoiding sleep deprivation.  Weight management (if BMI > 30).  Pharmacologic therapy should be used for management of restless legs syndrome only if present and clinically indicated and not based on the presence of periodic limb movements alone.    Diagnostic Codes:   Obstructive Sleep Apnea G47.33  Sleep Hypoxemia G47.36     5/30/2024 [facility] Diagnostic Sleep Study (239.0 lbs) - AHI 26.6, RDI 31.3, Supine AHI 46.7, REM AHI 37.4, Low O2 71.0%, Time Spent ?88% 26.2 minutes / Time Spent ?89% 36.9 minutes.    Attending MD: PSG was personally reviewed in detail with the Sleep Medicine Fellow.  The above interpretation reflects our joint assessment and recommendations.   _____________________________________   Electronically Signed By: Shahriar Abdul MD 07/01/2023

## 2024-07-01 NOTE — TELEPHONE ENCOUNTER
Please see most recent encounter/patient contact regarding this matter (see [employer] message dated 07/01/2024).

## 2024-07-03 ENCOUNTER — MYC REFILL (OUTPATIENT)
Dept: FAMILY MEDICINE | Facility: CLINIC | Age: 49
End: 2024-07-03
Payer: COMMERCIAL

## 2024-07-03 ENCOUNTER — DOCUMENTATION ONLY (OUTPATIENT)
Dept: FAMILY MEDICINE | Facility: CLINIC | Age: 49
End: 2024-07-03
Payer: COMMERCIAL

## 2024-07-03 DIAGNOSIS — M17.30: Primary | ICD-10-CM

## 2024-07-03 DIAGNOSIS — I10 BENIGN ESSENTIAL HYPERTENSION: ICD-10-CM

## 2024-07-03 RX ORDER — LOSARTAN POTASSIUM 25 MG/1
25 TABLET ORAL DAILY
Qty: 90 TABLET | Refills: 3 | OUTPATIENT
Start: 2024-07-03

## 2024-07-09 ENCOUNTER — TRANSFERRED RECORDS (OUTPATIENT)
Dept: HEALTH INFORMATION MANAGEMENT | Facility: CLINIC | Age: 49
End: 2024-07-09
Payer: COMMERCIAL

## 2024-07-15 LAB
ATRIAL RATE - MUSE: 66 BPM
DIASTOLIC BLOOD PRESSURE - MUSE: NORMAL MMHG
INTERPRETATION ECG - MUSE: NORMAL
P AXIS - MUSE: 43 DEGREES
PR INTERVAL - MUSE: 166 MS
QRS DURATION - MUSE: 104 MS
QT - MUSE: 426 MS
QTC - MUSE: 446 MS
R AXIS - MUSE: 40 DEGREES
SYSTOLIC BLOOD PRESSURE - MUSE: NORMAL MMHG
T AXIS - MUSE: 2 DEGREES
VENTRICULAR RATE- MUSE: 66 BPM

## 2024-07-24 ENCOUNTER — OFFICE VISIT (OUTPATIENT)
Dept: FAMILY MEDICINE | Facility: CLINIC | Age: 49
End: 2024-07-24
Payer: COMMERCIAL

## 2024-07-24 VITALS
HEIGHT: 69 IN | WEIGHT: 234.08 LBS | RESPIRATION RATE: 18 BRPM | HEART RATE: 86 BPM | OXYGEN SATURATION: 96 % | BODY MASS INDEX: 34.67 KG/M2 | DIASTOLIC BLOOD PRESSURE: 82 MMHG | TEMPERATURE: 98.4 F | SYSTOLIC BLOOD PRESSURE: 158 MMHG

## 2024-07-24 DIAGNOSIS — I10 BENIGN ESSENTIAL HYPERTENSION: ICD-10-CM

## 2024-07-24 DIAGNOSIS — Z96.651 STATUS POST TOTAL RIGHT KNEE REPLACEMENT: Primary | ICD-10-CM

## 2024-07-24 DIAGNOSIS — M17.30 POST-TRAUMATIC OSTEOARTHRITIS OF KNEE, UNSPECIFIED LATERALITY: ICD-10-CM

## 2024-07-24 PROCEDURE — 99214 OFFICE O/P EST MOD 30 MIN: CPT | Performed by: FAMILY MEDICINE

## 2024-07-24 PROCEDURE — G2211 COMPLEX E/M VISIT ADD ON: HCPCS | Performed by: FAMILY MEDICINE

## 2024-07-24 RX ORDER — LOSARTAN POTASSIUM 50 MG/1
50 TABLET ORAL DAILY
Qty: 90 TABLET | Refills: 3 | Status: SHIPPED | OUTPATIENT
Start: 2024-07-24 | End: 2024-09-18

## 2024-07-24 RX ORDER — OXYCODONE HYDROCHLORIDE 5 MG/1
5 TABLET ORAL EVERY 6 HOURS PRN
Qty: 12 TABLET | Refills: 0 | Status: SHIPPED | OUTPATIENT
Start: 2024-07-24 | End: 2024-07-27

## 2024-07-24 NOTE — PROGRESS NOTES
Assessment & Plan     Status post total right knee replacement: refilled oxycodone, 12 tablets.     Post-traumatic osteoarthritis of knee, unspecified laterality  - oxyCODONE (ROXICODONE) 5 MG tablet  Dispense: 12 tablet; Refill: 0    Benign essential hypertension: BP quite elevated on 2 checks, systolics in 150s-160s. Will increase losartan to 50 mg/day.   - losartan (COZAAR) 50 MG tablet  Dispense: 90 tablet; Refill: 3    The longitudinal plan of care for the diagnosis(es)/condition(s) as documented were addressed during this visit. Due to the added complexity in care, I will continue to support Dalila in the subsequent management and with ongoing continuity of care.      Subjective   Heu is a 49 year old, presenting for the following health issues:  Hypertension, Diabetes, Lipids (/), and Knee Pain      7/24/2024     7:43 AM   Additional Questions   Roomed by kendall allison MA   Accompanied by wife     Knee Pain    History of Present Illness       Diabetes:   He presents for follow up of diabetes.  He is checking home blood glucose one time daily.   He checks blood glucose at bedtime.  Blood glucose is sometimes over 200 and never under 70. He is aware of hypoglycemia symptoms including shakiness, dizziness, weakness and other.   He is concerned about blood sugar frequently over 200.   He is having excessive thirst and weight gain.  The patient has not had a diabetic eye exam in the last 12 months.          Hyperlipidemia:  He presents for follow up of hyperlipidemia.   He is taking medication to lower cholesterol. He is not having myalgia or other side effects to statin medications.    Hypertension: He presents for follow up of hypertension.  He does not check blood pressure  regularly outside of the clinic. Outside blood pressures have been over 140/90. He does not follow a low salt diet.     Reason for visit:  Follow up, high cholesterol, high blood pressure, diabetes, right knee pain.    He eats 0-1 servings of fruits  "and vegetables daily.He exercises with enough effort to increase his heart rate 10 to 19 minutes per day.  He exercises with enough effort to increase his heart rate 3 or less days per week.   He is not taking prescribed medications regularly due to remembering to take.     Had right knee replacement almost 2 weeks ago. Has 2 week post-op appt on Friday. No PT yet, is supposed to be cleared at 2 week post-op first. The pain has improved from a 10 to a 5 but he can't stay in 1 place too long without needing to move. So, he will sleep for about 30 minutes, then wake up with pain once more and need to move. Tries to stay awake until he is completely exhausted and then he can sleep for 1 hour or so before waking up. Wondering if he can get an oxycodone refill since he already went through his refill from Ortho.     Took his meds today.         Objective    BP (!) 158/82   Pulse 86   Temp 98.4  F (36.9  C) (Oral)   Resp 18   Ht 1.74 m (5' 8.5\")   Wt 106.2 kg (234 lb 1.3 oz)   SpO2 96%   BMI 35.07 kg/m    Body mass index is 35.07 kg/m .  Physical Exam   GENERAL: alert and no distress  MS: no gross musculoskeletal defects noted, except right knee with post-surgery bandage.   SKIN: no suspicious lesions or rashes and xanthelasma on eyelids  NEURO: Normal strength and tone, mentation intact and speech normal  PSYCH: mentation appears normal, affect normal/bright            Signed Electronically by: Elina Banks MD    "

## 2024-07-26 ENCOUNTER — TRANSFERRED RECORDS (OUTPATIENT)
Dept: HEALTH INFORMATION MANAGEMENT | Facility: CLINIC | Age: 49
End: 2024-07-26
Payer: COMMERCIAL

## 2024-07-31 ENCOUNTER — THERAPY VISIT (OUTPATIENT)
Dept: PHYSICAL THERAPY | Facility: CLINIC | Age: 49
End: 2024-07-31
Payer: COMMERCIAL

## 2024-07-31 DIAGNOSIS — G89.29 CHRONIC PAIN OF RIGHT KNEE: Primary | ICD-10-CM

## 2024-07-31 DIAGNOSIS — M25.561 CHRONIC PAIN OF RIGHT KNEE: Primary | ICD-10-CM

## 2024-07-31 PROCEDURE — 97110 THERAPEUTIC EXERCISES: CPT | Mod: GP

## 2024-07-31 PROCEDURE — 97161 PT EVAL LOW COMPLEX 20 MIN: CPT | Mod: GP

## 2024-07-31 ASSESSMENT — ACTIVITIES OF DAILY LIVING (ADL)
HOW_WOULD_YOU_RATE_THE_CURRENT_FUNCTION_OF_YOUR_KNEE_DURING_YOUR_USUAL_DAILY_ACTIVITIES_ON_A_SCALE_FROM_0_TO_100_WITH_100_BEING_YOUR_LEVEL_OF_KNEE_FUNCTION_PRIOR_TO_YOUR_INJURY_AND_0_BEING_THE_INABILITY_TO_PERFORM_ANY_OF_YOUR_USUAL_DAILY_ACTIVITIES?: 10
PAIN: THE SYMPTOM PREVENTS ME FROM ALL DAILY ACTIVITIES
SWELLING: THE SYMPTOM AFFECTS MY ACTIVITY SEVERELY
KNEE_ACTIVITY_OF_DAILY_LIVING_SCORE: 12.86
SQUAT: I AM UNABLE TO DO THE ACTIVITY
RAW_SCORE: 9
LIMPING: THE SYMPTOM PREVENTS ME FROM ALL DAILY ACTIVITIES
AS_A_RESULT_OF_YOUR_KNEE_INJURY,_HOW_WOULD_YOU_RATE_YOUR_CURRENT_LEVEL_OF_DAILY_ACTIVITY?: SEVERELY ABNORMAL
GIVING WAY, BUCKLING OR SHIFTING OF KNEE: THE SYMPTOM AFFECTS MY ACTIVITY SEVERELY
STIFFNESS: THE SYMPTOM PREVENTS ME FROM ALL DAILY ACTIVITIES
GO UP STAIRS: ACTIVITY IS VERY DIFFICULT
GO DOWN STAIRS: ACTIVITY IS VERY DIFFICULT
SIT WITH YOUR KNEE BENT: ACTIVITY IS VERY DIFFICULT
KNEE_ACTIVITY_OF_DAILY_LIVING_SUM: 9
SIT WITH YOUR KNEE BENT: ACTIVITY IS VERY DIFFICULT
HOW_WOULD_YOU_RATE_THE_OVERALL_FUNCTION_OF_YOUR_KNEE_DURING_YOUR_USUAL_DAILY_ACTIVITIES?: SEVERELY ABNORMAL
KNEEL ON THE FRONT OF YOUR KNEE: I AM UNABLE TO DO THE ACTIVITY
PLEASE_INDICATE_YOR_PRIMARY_REASON_FOR_REFERRAL_TO_THERAPY:: KNEE
SQUAT: I AM UNABLE TO DO THE ACTIVITY
SWELLING: THE SYMPTOM AFFECTS MY ACTIVITY SEVERELY
PAIN: THE SYMPTOM PREVENTS ME FROM ALL DAILY ACTIVITIES
GO DOWN STAIRS: ACTIVITY IS VERY DIFFICULT
WALK: ACTIVITY IS VERY DIFFICULT
LIMPING: THE SYMPTOM PREVENTS ME FROM ALL DAILY ACTIVITIES
STAND: ACTIVITY IS VERY DIFFICULT
RISE FROM A CHAIR: ACTIVITY IS VERY DIFFICULT
WEAKNESS: THE SYMPTOM AFFECTS MY ACTIVITY SEVERELY
STAND: ACTIVITY IS VERY DIFFICULT
WEAKNESS: THE SYMPTOM AFFECTS MY ACTIVITY SEVERELY
STIFFNESS: THE SYMPTOM PREVENTS ME FROM ALL DAILY ACTIVITIES
AS_A_RESULT_OF_YOUR_KNEE_INJURY,_HOW_WOULD_YOU_RATE_YOUR_CURRENT_LEVEL_OF_DAILY_ACTIVITY?: SEVERELY ABNORMAL
HOW_WOULD_YOU_RATE_THE_CURRENT_FUNCTION_OF_YOUR_KNEE_DURING_YOUR_USUAL_DAILY_ACTIVITIES_ON_A_SCALE_FROM_0_TO_100_WITH_100_BEING_YOUR_LEVEL_OF_KNEE_FUNCTION_PRIOR_TO_YOUR_INJURY_AND_0_BEING_THE_INABILITY_TO_PERFORM_ANY_OF_YOUR_USUAL_DAILY_ACTIVITIES?: 10
GO UP STAIRS: ACTIVITY IS VERY DIFFICULT
WALK: ACTIVITY IS VERY DIFFICULT
KNEEL ON THE FRONT OF YOUR KNEE: I AM UNABLE TO DO THE ACTIVITY
GIVING WAY, BUCKLING OR SHIFTING OF KNEE: THE SYMPTOM AFFECTS MY ACTIVITY SEVERELY
HOW_WOULD_YOU_RATE_THE_OVERALL_FUNCTION_OF_YOUR_KNEE_DURING_YOUR_USUAL_DAILY_ACTIVITIES?: SEVERELY ABNORMAL
RISE FROM A CHAIR: ACTIVITY IS VERY DIFFICULT

## 2024-07-31 NOTE — PROGRESS NOTES
PHYSICAL THERAPY EVALUATION  Type of Visit: Evaluation       Fall Risk Screen:  Fall screen completed by: PT  Have you fallen 2 or more times in the past year?: Yes  Have you fallen and had an injury in the past year?: No  Is patient a fall risk?: No    Subjective       Presenting condition or subjective complaint: post surgery    Pt presents to PT with R knee pain s/p R TKA on 2024 with [place]. Stayed overnight one night.     Pain is all over the knee and leg, depends on activity.     Pt reports pain levels are high, is taking ibuprofen/tylenol constantly throughout the day.     Has done 3 exercises since surgery, has been getting up here and there to move his knee. (Was given heel slides, ankle pumps, hip rolls). Is walking 2 blocks once a day, using SPC or walker.     Stairs one step at a time, painful.     Is not working right now, was hoping to go back to work soon, but that will require a lot of mobility and walking.     Has a gym membership at the [facility].     Date of onset: 24    Relevant medical history:     Dates & types of surgery: 2024 total right knee replacement    Prior diagnostic imaging/testing results: X-ray     Prior therapy history for the same diagnosis, illness or injury: No      Living Environment  Social support: With family members   Type of home: Other   Stairs to enter the home: Yes 3 Is there a railing: Yes     Ramp: No   Stairs inside the home: No       Help at home: Self Cares (home health aide/personal care attendant, family, etc)  Equipment owned: Straight Cane; Walker; Walker with wheels; Crutches; Power wheelchair or scooter; Bath bench     Employment: Yes /  Hobbies/Interests: sports    Patient goals for therapy: everything i could in the past    Pain assessment: Pain present  Location: R knee/Ratin/10     Objective   KNEE EVALUATION  INTEGUMENTARY (edema, incisions):  fullness of knee, warmth of knee, incision healed however still  puckered  GAIT:  Weightbearing Status: WBAT  Assistive Device(s): Cane (single end)  Gait Deviations: Antalgic  Reduced R TKE, reduced R stance, reduced L step length  BALANCE/PROPRIOCEPTION:  weight shifts performed with no assistance, no LOB noted  ROM:   (Degrees) Left AROM Left PROM  Right AROM Right PROM   Knee Flexion  140  101*   Knee Extension  2 hyper  6 from extension     STRENGTH:  SLR: pt unable to perform without 15deg lag, pain during SLR  Able to contract quad, 5/5 quad set    FLEXIBILITY: Decreased R hamstring, decreased R calf    FUNCTIONAL TESTS:  Squat w assist, able to squat to 115deg w pain, L lateral weight shift    Assessment & Plan   CLINICAL IMPRESSIONS  Medical Diagnosis: R TKA    Treatment Diagnosis: R knee pain   Impression/Assessment: Patient is a 49 year old male with R TKA complaints.  The following significant findings have been identified: Pain, Decreased ROM/flexibility, Decreased joint mobility, Decreased strength, Impaired balance, Edema, Impaired gait, and Decreased activity tolerance. These impairments interfere with their ability to perform self care tasks, work tasks, recreational activities, household chores, driving , household mobility, and community mobility as compared to previous level of function.     Clinical Decision Making (Complexity):  Clinical Presentation: Stable/Uncomplicated  Clinical Presentation Rationale: based on medical and personal factors listed in PT evaluation  Clinical Decision Making (Complexity): Low complexity    PLAN OF CARE  Treatment Interventions:  Interventions: Gait Training, Manual Therapy, Neuromuscular Re-education, Therapeutic Activity, Therapeutic Exercise    Long Term Goals     PT Goal 1  Goal Identifier: ambulation  Goal Description: pt will demonstrate normal gait mechanics for 100' with no assistive device  Rationale: to maximize safety and independence with performance of ADLs and functional tasks;to maximize safety and independence  within the home;to maximize safety and independence within the community  Target Date: 09/25/24  PT Goal 2  Goal Identifier: stairs  Goal Description: pt will demo full flight of stairs without railing, no knee pain  Rationale: to maximize safety and independence with performance of ADLs and functional tasks;to maximize safety and independence within the home;to maximize safety and independence within the community  Target Date: 10/23/24      Frequency of Treatment: 1x/week  Duration of Treatment: 12 weeks    Recommended Referrals to Other Professionals:   Education Assessment:   Learner/Method: Patient;No Barriers to Learning  Education Comments: educated on diagnosis, prognosis, expectations of therapy, and importance of movement    Risks and benefits of evaluation/treatment have been explained.   Patient/Family/caregiver agrees with Plan of Care.     Evaluation Time:     PT Eval, Low Complexity Minutes (99117): 15     Signing Clinician: WALKER RENDON PT        [facility]                                                                                   OUTPATIENT PHYSICAL THERAPY      PLAN OF TREATMENT FOR OUTPATIENT REHABILITATION   Patient's Last Name, First Name, M.PAULETTE MaloneDalila YOB: 1975   Provider's Name   [facility]   Medical Record No.  [id number]     Onset Date: 07/09/24  Start of Care Date: 07/31/24     Medical Diagnosis:  R TKA      PT Treatment Diagnosis:  R knee pain Plan of Treatment  Frequency/Duration: 1x/week/ 12 weeks    Certification date from 07/31/24 to 10/23/24         See note for plan of treatment details and functional goals     WALKER RENDON PT                         I CERTIFY THE NEED FOR THESE SERVICES FURNISHED UNDER        THIS PLAN OF TREATMENT AND WHILE UNDER MY CARE     (Physician attestation of this document indicates review and certification of the therapy plan).              Referring  Provider:  Mika Henry    Initial Assessment  See Epic Evaluation- Start of Care Date: 07/31/24

## 2024-08-01 PROBLEM — G89.29 CHRONIC PAIN OF RIGHT KNEE: Status: RESOLVED | Noted: 2024-05-23 | Resolved: 2024-08-01

## 2024-08-01 PROBLEM — M25.561 CHRONIC PAIN OF RIGHT KNEE: Status: RESOLVED | Noted: 2024-05-23 | Resolved: 2024-08-01

## 2024-08-05 ENCOUNTER — MYC REFILL (OUTPATIENT)
Dept: FAMILY MEDICINE | Facility: CLINIC | Age: 49
End: 2024-08-05
Payer: COMMERCIAL

## 2024-08-05 DIAGNOSIS — R42 DIZZINESS: ICD-10-CM

## 2024-08-05 DIAGNOSIS — G44.89 OTHER HEADACHE SYNDROME: ICD-10-CM

## 2024-08-06 RX ORDER — MECLIZINE HCL 12.5 MG 12.5 MG/1
12.5 TABLET ORAL 4 TIMES DAILY PRN
Qty: 30 TABLET | Refills: 3 | Status: SHIPPED | OUTPATIENT
Start: 2024-08-06

## 2024-08-09 ENCOUNTER — MYC REFILL (OUTPATIENT)
Dept: FAMILY MEDICINE | Facility: CLINIC | Age: 49
End: 2024-08-09
Payer: COMMERCIAL

## 2024-08-09 DIAGNOSIS — E11.9 TYPE 2 DIABETES MELLITUS WITHOUT COMPLICATION, WITHOUT LONG-TERM CURRENT USE OF INSULIN (H): ICD-10-CM

## 2024-08-09 DIAGNOSIS — M17.30 POST-TRAUMATIC OSTEOARTHRITIS OF KNEE, UNSPECIFIED LATERALITY: ICD-10-CM

## 2024-08-10 RX ORDER — IBUPROFEN 800 MG/1
800 TABLET, FILM COATED ORAL EVERY 8 HOURS PRN
Qty: 90 TABLET | Refills: 11 | Status: SHIPPED | OUTPATIENT
Start: 2024-08-10

## 2024-08-10 RX ORDER — OXYCODONE HYDROCHLORIDE 5 MG/1
5 TABLET ORAL EVERY 6 HOURS PRN
Qty: 12 TABLET | Refills: 0 | OUTPATIENT
Start: 2024-08-10

## 2024-08-10 RX ORDER — ACETAMINOPHEN 500 MG
500-1000 TABLET ORAL EVERY 6 HOURS PRN
Qty: 100 TABLET | Refills: 3 | Status: SHIPPED | OUTPATIENT
Start: 2024-08-10

## 2024-08-13 ENCOUNTER — THERAPY VISIT (OUTPATIENT)
Dept: PHYSICAL THERAPY | Facility: CLINIC | Age: 49
End: 2024-08-13
Payer: COMMERCIAL

## 2024-08-13 DIAGNOSIS — M25.561 CHRONIC PAIN OF RIGHT KNEE: Primary | ICD-10-CM

## 2024-08-13 DIAGNOSIS — G89.29 CHRONIC PAIN OF RIGHT KNEE: Primary | ICD-10-CM

## 2024-08-13 PROCEDURE — 97112 NEUROMUSCULAR REEDUCATION: CPT | Mod: GP | Performed by: PHYSICAL THERAPIST

## 2024-08-13 PROCEDURE — 97140 MANUAL THERAPY 1/> REGIONS: CPT | Mod: GP | Performed by: PHYSICAL THERAPIST

## 2024-08-13 PROCEDURE — 97110 THERAPEUTIC EXERCISES: CPT | Mod: GP | Performed by: PHYSICAL THERAPIST

## 2024-08-22 ENCOUNTER — THERAPY VISIT (OUTPATIENT)
Dept: PHYSICAL THERAPY | Facility: CLINIC | Age: 49
End: 2024-08-22
Payer: COMMERCIAL

## 2024-08-22 DIAGNOSIS — M25.561 CHRONIC PAIN OF RIGHT KNEE: Primary | ICD-10-CM

## 2024-08-22 DIAGNOSIS — G89.29 CHRONIC PAIN OF RIGHT KNEE: Primary | ICD-10-CM

## 2024-08-22 PROCEDURE — 97110 THERAPEUTIC EXERCISES: CPT | Mod: GP | Performed by: PHYSICAL THERAPIST

## 2024-08-29 ENCOUNTER — THERAPY VISIT (OUTPATIENT)
Dept: PHYSICAL THERAPY | Facility: CLINIC | Age: 49
End: 2024-08-29
Payer: COMMERCIAL

## 2024-08-29 DIAGNOSIS — G89.29 CHRONIC PAIN OF RIGHT KNEE: Primary | ICD-10-CM

## 2024-08-29 DIAGNOSIS — M25.561 CHRONIC PAIN OF RIGHT KNEE: Primary | ICD-10-CM

## 2024-08-29 PROCEDURE — 97140 MANUAL THERAPY 1/> REGIONS: CPT | Mod: GP | Performed by: PHYSICAL THERAPIST

## 2024-08-29 PROCEDURE — 97110 THERAPEUTIC EXERCISES: CPT | Mod: GP | Performed by: PHYSICAL THERAPIST

## 2024-09-03 ENCOUNTER — MYC MEDICAL ADVICE (OUTPATIENT)
Dept: FAMILY MEDICINE | Facility: CLINIC | Age: 49
End: 2024-09-03
Payer: COMMERCIAL

## 2024-09-04 ENCOUNTER — NURSE TRIAGE (OUTPATIENT)
Dept: FAMILY MEDICINE | Facility: CLINIC | Age: 49
End: 2024-09-04
Payer: COMMERCIAL

## 2024-09-04 NOTE — TELEPHONE ENCOUNTER
"Nurse Triage SBAR    Is this a 2nd Level Triage? NO    Situation: toe nail pain    Background:   - patient stated he noticed the discoloration for a very long time but the pain started about a few months ago    Assessment:   - toes are very sensitive to touch  - no pain when there is no pressure but if there is pressure, pain is at 10 out of 10  - toe nails are yellow and dark  - no other symptoms  - still able to walk and stand    Protocol Recommended Disposition:   See in Office Within 3 Days    Recommendation: office visit. Appt scheduled 9/9        KATRIN Baumann Cem, RN  [facility]        Reason for Disposition   MODERATE pain (e.g., interferes with normal activities, limping) and present > 3 days    Additional Information   Negative: Followed an injury   Negative: Wound looks infected   Negative: Caused by an animal bite   Negative: Caused by frostbite   Negative: Athlete's Foot suspected (i.e., itchy red rash in web space between toes)   Negative: Foot pain is main symptom   Negative: Foot is cool or blue in comparison to other foot   Negative: Purple or black skin on toe  (Exception: Person remembers bruising the toe from an injury.)   Negative: Patient sounds very sick or weak to the triager   Negative: SEVERE pain (e.g., excruciating, unable to do any normal activities) and not improved after 2 hours of pain medicine   Negative: Looks like a boil, infected sore, deep ulcer, or other infected rash (spreading redness, pus)   Negative: Yellow pus seen in skin around toenail (cuticle area), or pus seen under toenail   Negative: Numbness in one foot (i.e., loss of sensation)    Answer Assessment - Initial Assessment Questions  1. ONSET: \"When did the pain start?\"       Pain started a couple of months ago    2. LOCATION: \"Where is the pain located?\"   (e.g., around nail, entire toe, at foot joint)       2 big toe nails. Pain around the nail    3. PAIN: \"How bad is the pain?\"    (Scale 1-10; " "or mild, moderate, severe)    -  MILD (1-3): doesn't interfere with normal activities     -  MODERATE (4-7): interferes with normal activities (e.g., work or school) or awakens from sleep, limping     -  SEVERE (8-10): excruciating pain, unable to do any normal activities, unable to walk      With pressure pain is at 10/10 but no pain when no pressure. Toes are very sensitive    4. APPEARANCE: \"What does the toe look like?\" (e.g., redness, swelling, bruising, pallor)      Yellow and dark on toe nails    5. CAUSE: \"What do you think is causing the toe pain?\"      Possible fungus    6. OTHER SYMPTOMS: \"Do you have any other symptoms?\" (e.g., leg pain, rash, fever, numbness)      Pain    7. PREGNANCY: \"Is there any chance you are pregnant?\" \"When was your last menstrual period?\"      no    Protocols used: Toe Pain-A-[place]    "

## 2024-09-05 ENCOUNTER — THERAPY VISIT (OUTPATIENT)
Dept: PHYSICAL THERAPY | Facility: CLINIC | Age: 49
End: 2024-09-05
Payer: COMMERCIAL

## 2024-09-05 DIAGNOSIS — M25.561 CHRONIC PAIN OF RIGHT KNEE: Primary | ICD-10-CM

## 2024-09-05 DIAGNOSIS — G89.29 CHRONIC PAIN OF RIGHT KNEE: Primary | ICD-10-CM

## 2024-09-05 PROCEDURE — 97140 MANUAL THERAPY 1/> REGIONS: CPT | Mod: GP | Performed by: PHYSICAL THERAPIST

## 2024-09-05 PROCEDURE — 97110 THERAPEUTIC EXERCISES: CPT | Mod: GP | Performed by: PHYSICAL THERAPIST

## 2024-09-09 ENCOUNTER — OFFICE VISIT (OUTPATIENT)
Dept: FAMILY MEDICINE | Facility: CLINIC | Age: 49
End: 2024-09-09
Payer: COMMERCIAL

## 2024-09-09 VITALS
RESPIRATION RATE: 16 BRPM | WEIGHT: 233.08 LBS | BODY MASS INDEX: 34.52 KG/M2 | DIASTOLIC BLOOD PRESSURE: 100 MMHG | TEMPERATURE: 97.2 F | SYSTOLIC BLOOD PRESSURE: 160 MMHG | HEIGHT: 69 IN | OXYGEN SATURATION: 95 % | HEART RATE: 69 BPM

## 2024-09-09 DIAGNOSIS — B35.1 ONYCHOMYCOSIS: Primary | ICD-10-CM

## 2024-09-09 DIAGNOSIS — L60.0 INGROWING TOENAIL: ICD-10-CM

## 2024-09-09 DIAGNOSIS — E11.9 TYPE 2 DIABETES MELLITUS WITHOUT COMPLICATION, WITHOUT LONG-TERM CURRENT USE OF INSULIN (H): ICD-10-CM

## 2024-09-09 DIAGNOSIS — Z23 NEED FOR VACCINATION: ICD-10-CM

## 2024-09-09 PROCEDURE — 99213 OFFICE O/P EST LOW 20 MIN: CPT | Mod: 25 | Performed by: FAMILY MEDICINE

## 2024-09-09 PROCEDURE — 90472 IMMUNIZATION ADMIN EACH ADD: CPT | Performed by: FAMILY MEDICINE

## 2024-09-09 PROCEDURE — 90471 IMMUNIZATION ADMIN: CPT | Performed by: FAMILY MEDICINE

## 2024-09-09 PROCEDURE — 90656 IIV3 VACC NO PRSV 0.5 ML IM: CPT | Performed by: FAMILY MEDICINE

## 2024-09-09 PROCEDURE — 90677 PCV20 VACCINE IM: CPT | Performed by: FAMILY MEDICINE

## 2024-09-09 RX ORDER — TERBINAFINE HYDROCHLORIDE 250 MG/1
250 TABLET ORAL DAILY
Qty: 90 TABLET | Refills: 0 | Status: SHIPPED | OUTPATIENT
Start: 2024-09-09 | End: 2024-12-08

## 2024-09-09 NOTE — PATIENT INSTRUCTIONS
-Thank you for choosing the [facility].  -It was a pleasure to see you today.  -Please take a look at the information below for more specific details regarding the treatment plan and recommendations.  -In this after visit summary is a list of your medications and specific instructions.  Please review this carefully as there may be changes made to your medication list.  -If there are any particular questions or concerns, please feel free to reach out to Dr. Gonzalez.  -If any labs have been completed, we will reach out to you about results.  If the results are normal or not concerning, a letter or [employer] message will be sent to you.  If any follow-up is needed, either Dr. Gonzalez or the nurse will give you a call.  If you have not heard regarding results after 2 weeks, please reach out to the clinic.    Patient Instructions:    -Start the terbinafine as prescribed.   -Put the cotton wisp between the ingrowing toenail and the toe itself to help the toenail grow outwards.   -Keep the toes dry and air-out the toes as often as you can.  -Wash your toes and feet every day, particularly after wearing socks.  -Limit/avoid wearing socks as able.    Please seek immediate medical attention (go to the emergency room or urgent care) for the following reasons: worsening symptoms, pus, discharge, redness, swelling, fevers, chills, or any concerning changes.      --------------------------------------------------------------------------------------------------------------------    -We are always looking for ways to improve.  You may be selected to receive a survey regarding your visit today.  We encourage you to complete the survey and provide specific, constructive feedback to help us improve our processes.  Thank you for your time!  -Please review the contact information listed on the after visit summary and in the electronic chart.  Below is the phone number that we have on file.  If there are any changes that  are needed to be made, please reach out to the clinic.  [phone] (home)

## 2024-09-09 NOTE — PROGRESS NOTES
OFFICE VISIT    Assessment/Plan:     Patient Instructions:    -Start the terbinafine as prescribed.   -Put the cotton wisp between the ingrowing toenail and the toe itself to help the toenail grow outwards.   -Keep the toes dry and air-out the toes as often as you can.  -Wash your toes and feet every day, particularly after wearing socks.  -Limit/avoid wearing socks as able.    Please seek immediate medical attention (go to the emergency room or urgent care) for the following reasons: worsening symptoms, pus, discharge, redness, swelling, fevers, chills, or any concerning changes.      Heu was seen today for toe pain.  Diagnoses and all orders for this visit:    Onychomycosis  Ingrowing toenail: Bilateral.  After discussion, plan as below.  Patient will apply a wisp of cotton between the ingrowing toenail and toe itself monitor his growth. No signs of infection at this time.  Patient will return in 1 month to complete the liver lab and A1c as below.  Orders:  -     Hepatic function panel; Future  -     terbinafine (LAMISIL) 250 MG tablet; Take 1 tablet (250 mg) by mouth daily.    Type 2 diabetes mellitus without complication, without long-term current use of insulin (H)  -     HEMOGLOBIN A1C; Future    Need for vaccination  -     INFLUENZA VACCINE,SPLIT VIRUS,TRIVALENT,PF(FLUZONE)  -     PNEUMOCOCCAL 20 VALENT CONJUGATE (PREVNAR 20)        Return in about 3 months (around 12/9/2024) for Diabetes.    The diagnoses, treatment options, risk, benefits, and recommendations were reviewed with patient/guardian.  Questions were answered to patient's/guardian satisfaction.  Red flag signs were reviewed.  Patient/guardian is in agreement with above plan.      Subjective: 49 year old male with history of diabetes mellitus type 2, FLYNN, chronic right knee pain, hypertension, posttraumatic osteoarthritis of the knee, sacroiliac joint pain who presents to clinic for the following complaints:   Patient presents with:  Toe Pain:  Both big toes - toe fungus     Answers submitted by the patient for this visit:  General Questionnaire (Submitted on 9/8/2024)  Chief Complaint: Chronic problems general questions HPI Form  How many days per week do you miss taking your medication?: 0  General Concern (Submitted on 9/8/2024)  Chief Complaint: Chronic problems general questions HPI Form  What is the reason for your visit today?: Toenail fungus  When did your symptoms begin?: More than a month  What are your symptoms?: Pain in toe  How would you describe these symptoms?: Severe  Are your symptoms:: Worsening  Have you had these symptoms before?: Yes  Have you tried or received treatment for these symptoms before?: Yes  Did that treatment work? : No  Is there anything that makes you feel worse?: Pressure or touching it  Is there anything that makes you feel better?: Just leaving it to air out.    Started a year ago and didn't hurt until two weeks ago.  Putting on the shoes and even just slight pressure causes pain. The toenails of the great toe on both sides are thick and there is some fungus underneath. No pain is noted if there is no pressure. Denies any recent falls/injuries, pus, discharge, fevers, chills, nausea, vomiting, or other changes from baseline. Tried OTC antifungal medication for months now, but it hasn't helped.         HM due was reviewed with patient/parent.  Recommendations, risk, benefits were reviewed.  Accepted recommendations were ordered.  Otherwise, patient/parent declined.    Health Maintenance Due   Topic Date Due    Pneumococcal Vaccine: Pediatrics (0 to 5 Years) and At-Risk Patients (6 to 64 Years) (1 of 2 - PCV) Never done    HEPATITIS B IMMUNIZATION (2 of 3 - 19+ 3-dose series) 10/31/2023    A1C  08/12/2024    INFLUENZA VACCINE (1) 09/01/2024    ANNUAL REVIEW OF HM ORDERS  10/03/2024         Immunization History   Administered Date(s) Administered    COVID-19 12+ (Pfizer) 10/03/2023    COVID-19 Bivalent 12+ (Pfizer)  "01/31/2023    COVID-19 MONOVALENT 12+ (Pfizer) 03/11/2021, 04/01/2021, 01/09/2022    Hepatitis B, Adult 10/03/2023    Influenza Vaccine 18-64 (Flublok) 03/02/2020    Influenza Vaccine >6 months,quad, PF 10/08/2021, 09/23/2022, 10/03/2023    TDAP (Adacel,Boostrix) 10/08/2021         The 10 point review of system is negative except as stated in the HPI.    Allergies were reviewed and updated.    Normal LFTs noted from 9/12/2023.    Objective:   BP (!) 140/88   Pulse 69   Temp 97.2  F (36.2  C) (Temporal)   Resp 16   Ht 1.74 m (5' 8.5\")   Wt 105.7 kg (233 lb 1.3 oz)   SpO2 95%   BMI 34.92 kg/m    General: Active, alert, nontoxic-appearing.  No acute distress.  HEENT: Normocephalic, atraumatic.  Pupils are equal and round.  Sclera is clear.  Normal external ears. Nares patent.  Moist mucous membranes.    Cardiac: RRR.  S1, S2 present.  No murmurs, rubs, or gallops.  Respiratory/chest: Clear to auscultation bilaterally.  No wheezes, rales, rhonchi.  Breathing is not labored.  No accessory muscle usage.  Extremities: Voluntary movements intact.  Integumentary: Thickened discolored and dysmorphic nails noted at the great toes bilaterally.  Pressure on the toenail causes pain that localizes to the lateral edge where the toenail grows new words slightly.  No redness, pus, discharge, increased warmth noted.  Otherwise, no concerning rash or skin changes appreciated.        Esdras Gonzalez MD  [facility] [place] [address]  Phone: [phone]  Fax: [phone]    9/11/2024  5:26 PM            Current Outpatient Medications   Medication Sig Dispense Refill    acetaminophen (TYLENOL) 500 MG tablet Take 1-2 tablets (500-1,000 mg) by mouth every 6 hours as needed for mild pain 100 tablet 3    albuterol (VENTOLIN HFA) 108 (90 Base) MCG/ACT inhaler INHALE 2 PUFFS INTO THE LUNGS EVERY 6 HOURS AS NEEDED FOR SHORTNESS OF BREATH OR WHEEZING OR COUGH 18 g 1    aspirin-acetaminophen-caffeine (EXCEDRIN " MIGRAINE) 250-250-65 MG tablet Take 2 tablets by mouth daily as needed for headaches 60 tablet 1    blood glucose (NO BRAND SPECIFIED) test strip Use to test blood sugar 1 times daily or as directed. To accompany: Blood Glucose Monitor Brands: per insurance. 100 strip 6    blood glucose monitoring (NO BRAND SPECIFIED) meter device kit Use to test blood sugar 1 times daily or as directed. Preferred blood glucose meter OR supplies to accompany: Blood Glucose Monitor Brands: per insurance. 1 kit 0    cyclobenzaprine (FLEXERIL) 5 MG tablet Take 1 tablet (5 mg) by mouth nightly as needed for muscle spasms 30 tablet 0    empagliflozin (JARDIANCE) 25 MG TABS tablet Take 1 tablet (25 mg) by mouth daily 90 tablet 0    fluticasone (FLONASE) 50 MCG/ACT nasal spray Spray 2 sprays into both nostrils daily      ibuprofen (ADVIL/MOTRIN) 800 MG tablet Take 1 tablet (800 mg) by mouth every 8 hours as needed for fever 90 tablet 11    losartan (COZAAR) 50 MG tablet Take 1 tablet (50 mg) by mouth daily 90 tablet 3    meclizine (ANTIVERT) 12.5 MG tablet Take 1 tablet (12.5 mg) by mouth 4 times daily as needed for dizziness 30 tablet 3    montelukast (SINGULAIR) 10 MG tablet TAKE 1 TABLET(10 MG) BY MOUTH AT BEDTIME 30 tablet 11    pravastatin (PRAVACHOL) 40 MG tablet Take 1 tablet (40 mg) by mouth daily 90 tablet 3    terbinafine (LAMISIL) 250 MG tablet Take 1 tablet (250 mg) by mouth daily. 90 tablet 0    thin (NO BRAND SPECIFIED) lancets Use with lanceting device. To accompany: Blood Glucose Monitor Brands: per insurance. 100 each 6     No current facility-administered medications for this visit.       No Known Allergies    Patient Active Problem List    Diagnosis Date Noted    Chronic pain of right knee 05/23/2024     Priority: Medium    Obstructive sleep apnea 05/13/2024     Priority: Medium    Post-traumatic osteoarthritis of knee, unspecified laterality 01/04/2024     Priority: Medium    Type 2 diabetes mellitus without  complication, without long-term current use of insulin (H) 10/03/2023     Priority: Medium    Prediabetes 09/16/2021     Priority: Medium     9/14/21: A1C 6.3      Sacroiliac joint pain 07/25/2019     Priority: Medium    Benign essential hypertension 07/19/2019     Priority: Medium       Family History   Problem Relation Age of Onset    Diabetes Mother     Cerebrovascular Disease Mother     Hypertension Mother     Cerebrovascular Disease Father     Hypertension Father     Diabetes Father     Diabetes Sister     Hypertension Sister     Hyperlipidemia Sister     Obesity Sister     Diabetes Sister     Hypertension Sister     Cerebrovascular Disease Brother     Other Cancer Other        Past Surgical History:   Procedure Laterality Date    NO PAST SURGERIES          Social History     Socioeconomic History    Marital status:      Spouse name: Not on file    Number of children: Not on file    Years of education: Not on file    Highest education level: Not on file   Occupational History    Not on file   Tobacco Use    Smoking status: Former     Current packs/day: 0.00     Average packs/day: 0.5 packs/day for 25.6 years (12.8 ttl pk-yrs)     Types: Cigarettes     Start date: 1/1/1996     Quit date: 8/4/2021     Years since quitting: 3.1     Passive exposure: Never    Smokeless tobacco: Never    Tobacco comments:     smokes 6-8 per day   Vaping Use    Vaping status: Never Used   Substance and Sexual Activity    Alcohol use: Not Currently     Comment: Alcoholic Drinks/day: occasional    Drug use: Never    Sexual activity: Yes     Partners: Female     Birth control/protection: None   Other Topics Concern    Parent/sibling w/ CABG, MI or angioplasty before 65F 55M? No   Social History Narrative    Not on file     Social Determinants of Health     Financial Resource Strain: Low Risk  (12/29/2023)    Financial Resource Strain     Within the past 12 months, have you or your family members you live with been unable to get  utilities (heat, electricity) when it was really needed?: No   Food Insecurity: Low Risk  (12/29/2023)    Food Insecurity     Within the past 12 months, did you worry that your food would run out before you got money to buy more?: No     Within the past 12 months, did the food you bought just not last and you didn t have money to get more?: No   Transportation Needs: Low Risk  (12/29/2023)    Transportation Needs     Within the past 12 months, has lack of transportation kept you from medical appointments, getting your medicines, non-medical meetings or appointments, work, or from getting things that you need?: No   Physical Activity: Not on file   Stress: Not on file   Social Connections: Not on file   Interpersonal Safety: Low Risk  (9/9/2024)    Interpersonal Safety     Do you feel physically and emotionally safe where you currently live?: Yes     Within the past 12 months, have you been hit, slapped, kicked or otherwise physically hurt by someone?: No     Within the past 12 months, have you been humiliated or emotionally abused in other ways by your partner or ex-partner?: No   Housing Stability: Low Risk  (12/29/2023)    Housing Stability     Do you have housing? : Yes     Are you worried about losing your housing?: No   Prior to immunization administration, verified patients identity using patient s name and date of birth. Please see Immunization Activity for additional information.     Screening Questionnaire for Adult Immunization    Are you sick today?   No   Do you have allergies to medications, food, a vaccine component or latex?   No   Have you ever had a serious reaction after receiving a vaccination?   No   Do you have a long-term health problem with heart, lung, kidney, or metabolic disease (e.g., diabetes), asthma, a blood disorder, no spleen, complement component deficiency, a cochlear implant, or a spinal fluid leak?  Are you on long-term aspirin therapy?   Yes   Do you have cancer, leukemia,  HIV/AIDS, or any other immune system problem?   No   Do you have a parent, brother, or sister with an immune system problem?   No   In the past 3 months, have you taken medications that affect  your immune system, such as prednisone, other steroids, or anticancer drugs; drugs for the treatment of rheumatoid arthritis, Crohn s disease, or psoriasis; or have you had radiation treatments?   No   Have you had a seizure, or a brain or other nervous system problem?   No   During the past year, have you received a transfusion of blood or blood    products, or been given immune (gamma) globulin or antiviral drug?   No   For women: Are you pregnant or is there a chance you could become       pregnant during the next month?   No   Have you received any vaccinations in the past 4 weeks?   No     Immunization questionnaire was positive for at least one answer.  Notified Dr Gonzalez .      Patient instructed to remain in clinic for 15 minutes afterwards, and to report any adverse reactions.     Screening performed by Mary Milton MA on 9/9/2024 at 11:06 AM.

## 2024-09-11 PROBLEM — R73.03 PREDIABETES: Status: RESOLVED | Noted: 2021-09-16 | Resolved: 2024-09-11

## 2024-09-12 ENCOUNTER — THERAPY VISIT (OUTPATIENT)
Dept: PHYSICAL THERAPY | Facility: CLINIC | Age: 49
End: 2024-09-12
Payer: COMMERCIAL

## 2024-09-12 DIAGNOSIS — M25.561 CHRONIC PAIN OF RIGHT KNEE: Primary | ICD-10-CM

## 2024-09-12 DIAGNOSIS — G89.29 CHRONIC PAIN OF RIGHT KNEE: Primary | ICD-10-CM

## 2024-09-12 PROCEDURE — 97140 MANUAL THERAPY 1/> REGIONS: CPT | Mod: GP | Performed by: PHYSICAL THERAPIST

## 2024-09-12 PROCEDURE — 97110 THERAPEUTIC EXERCISES: CPT | Mod: GP | Performed by: PHYSICAL THERAPIST

## 2024-09-18 ENCOUNTER — OFFICE VISIT (OUTPATIENT)
Dept: FAMILY MEDICINE | Facility: CLINIC | Age: 49
End: 2024-09-18
Payer: COMMERCIAL

## 2024-09-18 VITALS
TEMPERATURE: 97.2 F | HEIGHT: 69 IN | HEART RATE: 71 BPM | OXYGEN SATURATION: 98 % | SYSTOLIC BLOOD PRESSURE: 141 MMHG | RESPIRATION RATE: 16 BRPM | BODY MASS INDEX: 34.37 KG/M2 | WEIGHT: 232.08 LBS | DIASTOLIC BLOOD PRESSURE: 101 MMHG

## 2024-09-18 DIAGNOSIS — I10 BENIGN ESSENTIAL HYPERTENSION: ICD-10-CM

## 2024-09-18 DIAGNOSIS — E11.9 TYPE 2 DIABETES MELLITUS WITHOUT COMPLICATION, WITHOUT LONG-TERM CURRENT USE OF INSULIN (H): Primary | ICD-10-CM

## 2024-09-18 DIAGNOSIS — M17.30 POST-TRAUMATIC OSTEOARTHRITIS OF KNEE, UNSPECIFIED LATERALITY: ICD-10-CM

## 2024-09-18 LAB
CHOLEST SERPL-MCNC: 193 MG/DL
EST. AVERAGE GLUCOSE BLD GHB EST-MCNC: 134 MG/DL
FASTING STATUS PATIENT QL REPORTED: NO
HBA1C MFR BLD: 6.3 % (ref 0–5.6)
HDLC SERPL-MCNC: 35 MG/DL
LDLC SERPL CALC-MCNC: 109 MG/DL
NONHDLC SERPL-MCNC: 158 MG/DL
TRIGL SERPL-MCNC: 246 MG/DL

## 2024-09-18 PROCEDURE — 83036 HEMOGLOBIN GLYCOSYLATED A1C: CPT | Performed by: FAMILY MEDICINE

## 2024-09-18 PROCEDURE — 99214 OFFICE O/P EST MOD 30 MIN: CPT | Performed by: FAMILY MEDICINE

## 2024-09-18 PROCEDURE — 80061 LIPID PANEL: CPT | Performed by: FAMILY MEDICINE

## 2024-09-18 PROCEDURE — 91320 SARSCV2 VAC 30MCG TRS-SUC IM: CPT | Performed by: FAMILY MEDICINE

## 2024-09-18 PROCEDURE — 90480 ADMN SARSCOV2 VAC 1/ONLY CMP: CPT | Performed by: FAMILY MEDICINE

## 2024-09-18 PROCEDURE — 36415 COLL VENOUS BLD VENIPUNCTURE: CPT | Performed by: FAMILY MEDICINE

## 2024-09-18 RX ORDER — METFORMIN HCL 500 MG
500 TABLET, EXTENDED RELEASE 24 HR ORAL 2 TIMES DAILY WITH MEALS
Qty: 180 TABLET | Refills: 3 | Status: SHIPPED | OUTPATIENT
Start: 2024-09-18

## 2024-09-18 RX ORDER — LOSARTAN POTASSIUM 50 MG/1
75 TABLET ORAL DAILY
Qty: 135 TABLET | Refills: 3 | Status: SHIPPED | OUTPATIENT
Start: 2024-09-18

## 2024-09-18 NOTE — PROGRESS NOTES
"  Assessment & Plan     Type 2 diabetes mellitus without complication, without long-term current use of insulin (H): A1c at goal, 6.3. congratulated him on this. He is only taking jardiance, not sure when he stopped metformin but will restart 500 mg XR BID.   - Hemoglobin A1c  - Lipid Profile  - metFORMIN (GLUCOPHAGE XR) 500 MG 24 hr tablet  Dispense: 180 tablet; Refill: 3    Benign essential hypertension: Blood pressure elevated on 2 checks today.  Will increase losartan to 75 mg/day.  Return for blood pressure check in 2 weeks.  - losartan (COZAAR) 50 MG tablet  Dispense: 135 tablet; Refill: 3    Post-traumatic osteoarthritis of knee, unspecified laterality: improving well since surgery. No longer having pain and is working on ROM and strength. Will continue working with PT.         Aruna Conner is a 49 year old, presenting for the following health issues:  Follow Up (Toe nail fungus )      9/18/2024     9:54 AM   Additional Questions   Roomed by VICTOR MANUEL LARA MA   Accompanied by SELF     History of Present Illness       Reason for visit:  Toenail fungus  Symptom onset:  More than a month  Symptoms include:  Pain in toe  Symptom intensity:  Severe  Symptom progression:  Worsening  Had these symptoms before:  Yes  Has tried/received treatment for these symptoms:  Yes  Previous treatment was successful:  No  What makes it worse:  Pressure or touching it  What makes it better:  Just leaving it to air out.   He is taking medications regularly.       Wonders if Energy drink might have made BP higher today.     Toenail fungus. Just started medication Ingrown toenail is not hurting anymore.     Diet pop- switched to this.     Right knee isn't hurting anymore. He is about 2 months post op. But seems like he hit a standstill with how far it can bend. Can bend just past 90 degrees. Working with PT.           Objective    BP (!) 141/101   Pulse 71   Temp 97.2  F (36.2  C) (Temporal)   Resp 16   Ht 1.74 m (5' 8.5\")   Wt " 105.3 kg (232 lb 1.3 oz)   SpO2 98%   BMI 34.77 kg/m    Body mass index is 34.77 kg/m .  Physical Exam   GENERAL: alert and no distress  EYES: Eyes grossly normal to inspection and has xanthelasma above both eyelids.  MS: right knee can bend just past 90 degrees  SKIN: no suspicious lesions or rashes  NEURO: Normal strength and tone, mentation intact and speech normal  PSYCH: mentation appears normal, affect normal/bright          Signed Electronically by: Elina Banks MD

## 2024-09-19 ENCOUNTER — THERAPY VISIT (OUTPATIENT)
Dept: PHYSICAL THERAPY | Facility: CLINIC | Age: 49
End: 2024-09-19
Payer: COMMERCIAL

## 2024-09-19 DIAGNOSIS — M25.561 CHRONIC PAIN OF RIGHT KNEE: Primary | ICD-10-CM

## 2024-09-19 DIAGNOSIS — G89.29 CHRONIC PAIN OF RIGHT KNEE: Primary | ICD-10-CM

## 2024-09-19 PROCEDURE — 97110 THERAPEUTIC EXERCISES: CPT | Mod: GP | Performed by: PHYSICAL THERAPIST

## 2024-09-19 PROCEDURE — 97530 THERAPEUTIC ACTIVITIES: CPT | Mod: GP | Performed by: PHYSICAL THERAPIST

## 2024-09-19 PROCEDURE — 97140 MANUAL THERAPY 1/> REGIONS: CPT | Mod: GP | Performed by: PHYSICAL THERAPIST

## 2024-09-26 ENCOUNTER — THERAPY VISIT (OUTPATIENT)
Dept: PHYSICAL THERAPY | Facility: CLINIC | Age: 49
End: 2024-09-26
Payer: COMMERCIAL

## 2024-09-26 DIAGNOSIS — M25.561 CHRONIC PAIN OF RIGHT KNEE: Primary | ICD-10-CM

## 2024-09-26 DIAGNOSIS — G89.29 CHRONIC PAIN OF RIGHT KNEE: Primary | ICD-10-CM

## 2024-09-26 PROCEDURE — 97110 THERAPEUTIC EXERCISES: CPT | Mod: GP | Performed by: PHYSICAL THERAPIST

## 2024-09-26 PROCEDURE — 97530 THERAPEUTIC ACTIVITIES: CPT | Mod: GP | Performed by: PHYSICAL THERAPIST

## 2024-09-26 PROCEDURE — 97140 MANUAL THERAPY 1/> REGIONS: CPT | Mod: GP | Performed by: PHYSICAL THERAPIST

## 2024-10-02 ENCOUNTER — ALLIED HEALTH/NURSE VISIT (OUTPATIENT)
Dept: FAMILY MEDICINE | Facility: CLINIC | Age: 49
End: 2024-10-02
Payer: COMMERCIAL

## 2024-10-02 VITALS
RESPIRATION RATE: 20 BRPM | DIASTOLIC BLOOD PRESSURE: 79 MMHG | HEART RATE: 76 BPM | OXYGEN SATURATION: 97 % | SYSTOLIC BLOOD PRESSURE: 124 MMHG

## 2024-10-02 DIAGNOSIS — I10 BENIGN ESSENTIAL HYPERTENSION: Primary | ICD-10-CM

## 2024-10-02 PROCEDURE — 99207 PR NO CHARGE NURSE ONLY: CPT

## 2024-10-02 NOTE — PROGRESS NOTES
I met with Dalila Malone at the request of Dr. Banks to recheck his blood pressure.  Blood pressure medications on the med list were reviewed with patient.    Patient has taken all medications as per usual regimen: Yes  Patient reports tolerating them without any issues or concerns: Yes    /79   Pulse 76   Resp 20   SpO2 97%     Patient already took BP medication (75 mg of losartan) this morning. Medication seems to be working well for his BP.     Blood pressure was taken, previous encounter was reviewed, recorded blood pressure below 140/90.  Patient was discharged and the note will be sent to the provider for final review.    Will route RN BP check visit to Dr. Banks to review and advise.    KATRIN Rodriguez, RN   [facility]

## 2024-10-03 ENCOUNTER — THERAPY VISIT (OUTPATIENT)
Dept: PHYSICAL THERAPY | Facility: CLINIC | Age: 49
End: 2024-10-03
Payer: COMMERCIAL

## 2024-10-03 DIAGNOSIS — G89.29 CHRONIC PAIN OF RIGHT KNEE: Primary | ICD-10-CM

## 2024-10-03 DIAGNOSIS — M25.561 CHRONIC PAIN OF RIGHT KNEE: Primary | ICD-10-CM

## 2024-10-03 PROCEDURE — 97140 MANUAL THERAPY 1/> REGIONS: CPT | Mod: GP | Performed by: PHYSICAL THERAPIST

## 2024-10-03 PROCEDURE — 97110 THERAPEUTIC EXERCISES: CPT | Mod: GP | Performed by: PHYSICAL THERAPIST

## 2024-10-03 PROCEDURE — 97530 THERAPEUTIC ACTIVITIES: CPT | Mod: GP | Performed by: PHYSICAL THERAPIST

## 2024-10-14 DIAGNOSIS — E11.9 TYPE 2 DIABETES MELLITUS WITHOUT COMPLICATION, WITHOUT LONG-TERM CURRENT USE OF INSULIN (H): ICD-10-CM

## 2024-10-14 RX ORDER — EMPAGLIFLOZIN 25 MG/1
25 TABLET, FILM COATED ORAL DAILY
Qty: 90 TABLET | Refills: 0 | Status: SHIPPED | OUTPATIENT
Start: 2024-10-14

## 2024-10-21 ENCOUNTER — ALLIED HEALTH/NURSE VISIT (OUTPATIENT)
Dept: FAMILY MEDICINE | Facility: CLINIC | Age: 49
End: 2024-10-21
Payer: COMMERCIAL

## 2024-10-21 ENCOUNTER — LAB (OUTPATIENT)
Dept: LAB | Facility: CLINIC | Age: 49
End: 2024-10-21
Payer: COMMERCIAL

## 2024-10-21 VITALS — OXYGEN SATURATION: 96 % | DIASTOLIC BLOOD PRESSURE: 79 MMHG | SYSTOLIC BLOOD PRESSURE: 131 MMHG | HEART RATE: 75 BPM

## 2024-10-21 DIAGNOSIS — L60.0 INGROWING TOENAIL: ICD-10-CM

## 2024-10-21 DIAGNOSIS — E11.9 TYPE 2 DIABETES MELLITUS WITHOUT COMPLICATION, WITHOUT LONG-TERM CURRENT USE OF INSULIN (H): ICD-10-CM

## 2024-10-21 DIAGNOSIS — B35.1 ONYCHOMYCOSIS: ICD-10-CM

## 2024-10-21 DIAGNOSIS — I10 BENIGN ESSENTIAL HYPERTENSION: Primary | ICD-10-CM

## 2024-10-21 DIAGNOSIS — Z11.4 SCREENING FOR HIV (HUMAN IMMUNODEFICIENCY VIRUS): Primary | ICD-10-CM

## 2024-10-21 LAB
ALBUMIN SERPL BCG-MCNC: 4.4 G/DL (ref 3.5–5.2)
ALP SERPL-CCNC: 97 U/L (ref 40–150)
ALT SERPL W P-5'-P-CCNC: 45 U/L (ref 0–70)
AST SERPL W P-5'-P-CCNC: 23 U/L (ref 0–45)
BILIRUB DIRECT SERPL-MCNC: <0.2 MG/DL (ref 0–0.3)
BILIRUB SERPL-MCNC: 0.7 MG/DL
EST. AVERAGE GLUCOSE BLD GHB EST-MCNC: 148 MG/DL
HBA1C MFR BLD: 6.8 % (ref 0–5.6)
PROT SERPL-MCNC: 7.6 G/DL (ref 6.4–8.3)

## 2024-10-21 PROCEDURE — 80076 HEPATIC FUNCTION PANEL: CPT

## 2024-10-21 PROCEDURE — 36415 COLL VENOUS BLD VENIPUNCTURE: CPT

## 2024-10-21 PROCEDURE — 99207 PR NO CHARGE NURSE ONLY: CPT

## 2024-10-21 PROCEDURE — 83036 HEMOGLOBIN GLYCOSYLATED A1C: CPT

## 2024-10-21 NOTE — PROGRESS NOTES
I met with Dalila Malone at the request of Dr. Banks to recheck his blood pressure.  Blood pressure medications on the med list were reviewed with patient.    Patient has taken all medications as per usual regimen: Yes  Patient reports tolerating them without any issues or concerns: Yes    Vitals:    10/21/24 1033   BP: 131/79   Pulse: 75   SpO2: 96%       Blood pressure was taken, previous encounter was reviewed, recorded blood pressure below 140/90.  Patient was discharged and the note will be sent to the provider for final review.      Hung Malone RN  [facility]

## 2024-10-24 ENCOUNTER — THERAPY VISIT (OUTPATIENT)
Dept: PHYSICAL THERAPY | Facility: CLINIC | Age: 49
End: 2024-10-24
Payer: COMMERCIAL

## 2024-10-24 ENCOUNTER — MYC REFILL (OUTPATIENT)
Dept: FAMILY MEDICINE | Facility: CLINIC | Age: 49
End: 2024-10-24

## 2024-10-24 ENCOUNTER — MYC MEDICAL ADVICE (OUTPATIENT)
Dept: FAMILY MEDICINE | Facility: CLINIC | Age: 49
End: 2024-10-24

## 2024-10-24 DIAGNOSIS — M17.30 POST-TRAUMATIC OSTEOARTHRITIS OF KNEE, UNSPECIFIED LATERALITY: ICD-10-CM

## 2024-10-24 DIAGNOSIS — R42 DIZZINESS: ICD-10-CM

## 2024-10-24 DIAGNOSIS — M25.561 CHRONIC PAIN OF RIGHT KNEE: Primary | ICD-10-CM

## 2024-10-24 DIAGNOSIS — G89.29 CHRONIC PAIN OF RIGHT KNEE: Primary | ICD-10-CM

## 2024-10-24 DIAGNOSIS — L60.0 INGROWING TOENAIL: ICD-10-CM

## 2024-10-24 DIAGNOSIS — B35.1 ONYCHOMYCOSIS: ICD-10-CM

## 2024-10-24 PROCEDURE — 97140 MANUAL THERAPY 1/> REGIONS: CPT | Mod: GP | Performed by: PHYSICAL THERAPIST

## 2024-10-24 PROCEDURE — 97110 THERAPEUTIC EXERCISES: CPT | Mod: GP | Performed by: PHYSICAL THERAPIST

## 2024-10-24 PROCEDURE — 97530 THERAPEUTIC ACTIVITIES: CPT | Mod: GP | Performed by: PHYSICAL THERAPIST

## 2024-10-24 RX ORDER — MECLIZINE HCL 12.5 MG 12.5 MG/1
12.5 TABLET ORAL 4 TIMES DAILY PRN
Qty: 30 TABLET | Refills: 1 | Status: SHIPPED | OUTPATIENT
Start: 2024-10-24

## 2024-10-24 RX ORDER — TERBINAFINE HYDROCHLORIDE 250 MG/1
250 TABLET ORAL DAILY
Qty: 90 TABLET | Refills: 0 | OUTPATIENT
Start: 2024-10-24

## 2024-10-24 RX ORDER — ACETAMINOPHEN 500 MG
500-1000 TABLET ORAL EVERY 6 HOURS PRN
Qty: 100 TABLET | Refills: 0 | Status: SHIPPED | OUTPATIENT
Start: 2024-10-24

## 2024-10-24 RX ORDER — IBUPROFEN 800 MG/1
800 TABLET, FILM COATED ORAL EVERY 8 HOURS PRN
Qty: 90 TABLET | Refills: 11 | Status: SHIPPED | OUTPATIENT
Start: 2024-10-24

## 2024-10-24 NOTE — TELEPHONE ENCOUNTER
Medication Question or Refill    Contacts       Contact Date/Time Type Contact Phone/Fax    10/24/2024 03:59 PM CDT Phone (Outgoing) Dalila Malone (Self) [phone] (M)    Talked with Patient             What medication are you calling about (include dose and sig)?: Terbinafine.  Provider sent in 90 tabs.  Patient received 30 tabs per bottle.  Medication can be picked up per pharmacy.      Preferred Pharmacy:   WRITTEN PRESCRIPTION REQUESTED  No address on file      [id number] - [address]  [address] [place] [address]  Phone: [phone] Fax: [phone]      Controlled Substance Agreement on file:   CSA -- Patient Level:    CSA: None found at the patient level.       Who prescribed the medication?: Dr. Gonzalez    Do you need a refill? Yes    When did you use the medication last? Completely out

## 2024-10-24 NOTE — PROGRESS NOTES
10/24/24 0500   Appointment Info   Signing clinician's name / credentials Rocio Parker, ZOIET, OCS, MTC   Total/Authorized Visits 12   Visits Used 10   Medical Diagnosis R TKA   PT Tx Diagnosis R knee pain   Progress Note/Certification   Start of Care Date 07/31/24   Onset of illness/injury or Date of Surgery 07/09/24   Therapy Frequency 1x every other week/every three weeks   Predicted Duration 8 weeks   Certification date from 10/24/24   Certification date to 12/19/24   GOALS   PT Goals 2   PT Goal 1   Goal Identifier ambulation   Goal Description pt will demonstrate normal gait mechanics for 100' with no assistive device   Rationale to maximize safety and independence with performance of ADLs and functional tasks;to maximize safety and independence within the home;to maximize safety and independence within the community   Goal Progress min antalgic   Target Date 12/19/24   PT Goal 2   Goal Identifier stairs   Goal Description pt will demo full flight of stairs without railing, no knee pain   Rationale to maximize safety and independence with performance of ADLs and functional tasks;to maximize safety and independence within the home;to maximize safety and independence within the community   Goal Progress challenging with last 25% of eccentric control of step down   Target Date 12/19/24   Subjective Report   Subjective Report Patient reporting he was out on vacaction and also dealing with family emergency.   Objective Measures   Objective Measures Objective Measure 1;Objective Measure 2   Objective Measure 1   Objective Measure Knee AROM:   Details 0-120 post manual   Treatment Interventions (PT)   Interventions Therapeutic Procedure/Exercise;Neuromuscular Re-education;Manual Therapy;Therapeutic Activity   Therapeutic Procedure/Exercise   Therapeutic Procedures: strength, endurance, ROM, flexibility minutes (04063) 10   Therapeutic Procedures Ther Proc 2;Ther Proc 3;Ther Proc 4   Ther Proc 1 Bike   Ther Proc 1 -  "Details x5 minutes today, full revolutions   Ther Proc 2 Single leg leg press, 5 plates   Ther Proc 2 - Details emphasis on eccentric lowering, 2x10   Skilled Intervention ex selection and education on priorities including ROM, gait, funtional mobility   Patient Response/Progress tolerated well, soreness but no sharp pain   Therapeutic Activity   Therapeutic Activities: dynamic activities to improve functional performance minutes (15182) 10   Therapeutic Activities Ther Act 2;Ther Act 3   Ther Act 2 Step ups   Ther Act 2 - Details 8\" x 15   Ther Act 3 Fwd step downs   Ther Act 3 - Details 6\", cues to control with L LE to minimize compensatory mechanics   Skilled Intervention direct supervision/education provided on tasks below, in order to improve independence with functional activities in home and community   Patient Response/Progress improved gait   Manual Therapy   Manual Therapy: Mobilization, MFR, MLD, friction massage minutes (32953) 15   Manual Therapy Manual Therapy 2   Manual Therapy 1 contract relax   Manual Therapy 1 - Details quad, in prone; 2 rounds   Manual Therapy 2 STM   Manual Therapy 2 - Details emphasis on inferior quad, scar tissue mobs completed with and without romnaa alva   Skilled Intervention intended to increase local circulation, reduce pain, as well as to reduce muscle stiffness and or improve joint mobility   Patient Response/Progress reduced anterior knee pain, improved knee ext   Education   Learner/Method Patient;No Barriers to Learning   Education Comments educated on diagnosis, prognosis, expectations of therapy, and importance of movement   Comments   Comments knee flexion range maintaining. continues to demo quad weakness in SL dominant functional activities. will benefit from just a handful more sessions to address strength deficits.   Total Session Time   Timed Code Treatment Minutes 35   Total Treatment Time (sum of timed and untimed services) 35         [facility]                                                                                   OUTPATIENT PHYSICAL THERAPY    PLAN OF TREATMENT FOR OUTPATIENT REHABILITATION   Patient's Last Name, First Name, Dalila Lynne YOB: 1975   Provider's Name   [facility]   Medical Record No.  [id number]     Onset Date: 07/09/24  Start of Care Date: 07/31/24     Medical Diagnosis:  R TKA      PT Treatment Diagnosis:  R knee pain Plan of Treatment  Frequency/Duration: 1x every other week/every three weeks/ 8 weeks    Certification date from 10/24/24 to 12/19/24         See note for plan of treatment details and functional goals     Rocio Parker, PT                         I CERTIFY THE NEED FOR THESE SERVICES FURNISHED UNDER        THIS PLAN OF TREATMENT AND WHILE UNDER MY CARE     (Physician attestation of this document indicates review and certification of the therapy plan).              Referring Provider:  Mika Henry    Initial Assessment  See Epic Evaluation- Start of Care Date: 07/31/24

## 2024-10-25 ENCOUNTER — TRANSFERRED RECORDS (OUTPATIENT)
Dept: HEALTH INFORMATION MANAGEMENT | Facility: CLINIC | Age: 49
End: 2024-10-25
Payer: COMMERCIAL

## 2024-10-29 ENCOUNTER — MYC REFILL (OUTPATIENT)
Dept: FAMILY MEDICINE | Facility: CLINIC | Age: 49
End: 2024-10-29
Payer: COMMERCIAL

## 2024-10-29 DIAGNOSIS — R05.1 ACUTE COUGH: ICD-10-CM

## 2024-10-29 RX ORDER — ALBUTEROL SULFATE 90 UG/1
INHALANT RESPIRATORY (INHALATION)
Qty: 18 G | Refills: 1 | Status: CANCELLED | OUTPATIENT
Start: 2024-10-29

## 2024-10-29 NOTE — TELEPHONE ENCOUNTER
Clinic RN: Please investigate patient's chart or contact patient if the information cannot be found because this medication was prescribed for an acute condition. Confirm current symptoms/need for medication and possible need for appointment. If necessary, document reason and route refill encounter to provider for approval or denial.    Laura Espinoza RN  [facility]

## 2024-11-07 ENCOUNTER — THERAPY VISIT (OUTPATIENT)
Dept: PHYSICAL THERAPY | Facility: CLINIC | Age: 49
End: 2024-11-07
Payer: COMMERCIAL

## 2024-11-07 ENCOUNTER — NURSE TRIAGE (OUTPATIENT)
Dept: FAMILY MEDICINE | Facility: CLINIC | Age: 49
End: 2024-11-07

## 2024-11-07 DIAGNOSIS — G89.29 CHRONIC PAIN OF RIGHT KNEE: Primary | ICD-10-CM

## 2024-11-07 DIAGNOSIS — M25.561 CHRONIC PAIN OF RIGHT KNEE: Primary | ICD-10-CM

## 2024-11-07 PROCEDURE — 97530 THERAPEUTIC ACTIVITIES: CPT | Mod: GP | Performed by: PHYSICAL THERAPIST

## 2024-11-07 PROCEDURE — 97110 THERAPEUTIC EXERCISES: CPT | Mod: GP | Performed by: PHYSICAL THERAPIST

## 2024-11-07 PROCEDURE — 97140 MANUAL THERAPY 1/> REGIONS: CPT | Mod: GP | Performed by: PHYSICAL THERAPIST

## 2024-11-07 NOTE — TELEPHONE ENCOUNTER
Dalila Lockett Medication Refill Pool - Primary Care (supporting Ayah Hayes MD)12 minutes ago (10:46 AM)       Cough and sort of breath when walking distances or working raking leaves.        You  Dalila Malone22 minutes ago (10:36 AM)     AT  [facility],     Our records show this medication was last prescribed for an acute cough in October 2023.  Could you please let us know if you are having a cough, or other symptoms?     Take care,  KATRIN Song, RN-[facility]       Dalila Lockett Medication Refill Pool - Primary Care9 days ago       Refills have been requested for the following medications:         albuterol (VENTOLIN HFA) 108 (90 Base) MCG/ACT inhaler [Ayah Hayes]     Preferred pharmacy: [id number] [address] AT [facility]          Called patient:  Left message to call back and ask to speak with an available nurse.      KATRIN Song, RN-[facility]

## 2024-11-07 NOTE — TELEPHONE ENCOUNTER
Writer responded via [employer].  NEREIDA SongN, RN-BC  [facility]

## 2024-11-07 NOTE — TELEPHONE ENCOUNTER
Writer responded via [employer].    See 11/7/24 nurse triage.    KATRIN Song, RN-[facility]

## 2024-11-08 NOTE — TELEPHONE ENCOUNTER
"Writer attempt #1 to call patient in attempt to triage patient based on reported symptoms of \"cough and SOB\". No answer, left VM with call back number.    If patient returns call back, please transfer call to an available RN to triage patient for reported symptoms. Thanks.    Aris Parker, BSN, RN, PHN   [facility]    "

## 2024-11-10 ENCOUNTER — MYC REFILL (OUTPATIENT)
Dept: FAMILY MEDICINE | Facility: CLINIC | Age: 49
End: 2024-11-10
Payer: COMMERCIAL

## 2024-11-10 ENCOUNTER — MYC MEDICAL ADVICE (OUTPATIENT)
Dept: FAMILY MEDICINE | Facility: CLINIC | Age: 49
End: 2024-11-10
Payer: COMMERCIAL

## 2024-11-10 DIAGNOSIS — R05.1 ACUTE COUGH: ICD-10-CM

## 2024-11-10 DIAGNOSIS — E11.9 TYPE 2 DIABETES MELLITUS WITHOUT COMPLICATION, WITHOUT LONG-TERM CURRENT USE OF INSULIN (H): ICD-10-CM

## 2024-11-10 RX ORDER — ALBUTEROL SULFATE 90 UG/1
INHALANT RESPIRATORY (INHALATION)
Qty: 18 G | Refills: 1 | Status: SHIPPED | OUTPATIENT
Start: 2024-11-10

## 2024-11-11 NOTE — TELEPHONE ENCOUNTER
Nurse Triage SBAR    Is this a 2nd Level Triage? NO    Situation:   Patient SOB, coughing with exertion.    Background:   Only uses albuterol as needed.  Ran out 2-3 months ago    Assessment:   Per patient, he last used Albuterol inhaler 2-3 months ago  Patient states he does have some wheezing while talking on the phone  Coughing only occur when he is working and walking around.  Cough/SOB is mild  The last time patient had symptoms , was prescribed inhaler and helped with symptoms  Patient does have asthma in the past  No recent travel per patient  No fever    Patient is requesting for Albuterol inhaler to be refilled, if possible.    Protocol Recommended Disposition:   See in Office within 2 weeks    Recommendation:   Care Advice given to patient.  Patient is open to seeing anyone who's available between hours of 10 am- 12 noon. Writer assisted in scheduling patient for:    Nov 14, 2024 10:40 AM  (Arrive by 10:20 AM)  Provider Visit with Sandy Henry MD  [facility] ([facility] ) [phone]     Patient verbalizes understanding, agrees with plan and has no further questions.    Will NOT route to provider, as patient obtained an appointment to be seen.    Does the patient meet one of the following criteria for ADS visit consideration? No    NEREIDA RodriguezN, RN, PHN   [facility]    Reason for Disposition   MILD longstanding difficulty breathing (e.g., speaks in phrases, SOB even at rest, pulse 100-120) and SAME as normal    Additional Information   Negative: SEVERE difficulty breathing (e.g., struggling for each breath, speaks in single words, pulse > 120)   Negative: Breathing stopped and hasn't returned   Negative: Choking on something   Negative: Bluish (or gray) lips or face   Negative: Difficult to awaken or acting confused (e.g., disoriented, slurred speech)   Negative: Passed out (i.e., fainted, collapsed and was not responding)    "Negative: Wheezing started suddenly after medicine, an allergic food, or bee sting   Negative: Stridor (harsh sound while breathing in)   Negative: Slow, shallow and weak breathing   Negative: Sounds like a life-threatening emergency to the triager   Negative: Chest pain   Negative: Wheezing (high pitched whistling sound) and previous asthma attacks or use of asthma medicines   Negative: Difficulty breathing and within 14 days of COVID-19 Exposure   Negative: Difficulty breathing and only present when coughing   Negative: Difficulty breathing and only from stuffy nose   Negative: Difficulty breathing and only from stuffy nose or runny nose from common cold   Negative: MODERATE difficulty breathing (e.g., speaks in phrases, SOB even at rest, pulse 100-120) of new-onset or worse than normal   Negative: Oxygen level (e.g., pulse oximetry) 90% or lower   Negative: Wheezing can be heard across the room   Negative: Drooling or spitting out saliva (because can't swallow)   Negative: Any history of prior \"blood clot\" in leg or lungs   Negative: Illness requiring prolonged bedrest in past month (e.g., immobilization, long hospital stay)   Negative: Hip or leg fracture (broken bone) in past month (or had cast on leg or ankle in past month)   Negative: Major surgery in the past month   Negative: Long-distance travel in past month (e.g., car, bus, train, plane; with trip lasting 6 or more hours)   Negative: Cancer treatment in past six months (or has cancer now)   Negative: Extra heartbeats, irregular heart beating, or heart is beating very fast (i.e., 'palpitations')   Negative: Fever > 103 F (39.4 C)   Negative: Fever > 101 F (38.3 C) and over 60 years of age   Negative: Fever > 100.0 F (37.8 C) and bedridden (e.g., nursing home patient, stroke, chronic illness, recovering from surgery)   Negative: Fever > 100.0 F (37.8 C) and diabetes mellitus or weak immune system (e.g., HIV positive, cancer chemo, splenectomy, organ " transplant, chronic steroids)   Negative: Periods where breathing stops and then resumes normally and bedridden (e.g., nursing home patient, CVA)   Negative: Pregnant or postpartum (from 0 to 6 weeks after delivery)   Negative: Patient sounds very sick or weak to the triager   Negative: MILD difficulty breathing (e.g., minimal/no SOB at rest, SOB with walking, pulse < 100) of new-onset or worse than normal   Negative: Longstanding difficulty breathing (e.g., CHF, COPD, emphysema) and worse than normal   Negative: Longstanding difficulty breathing and not responding to usual therapy   Negative: Continuous (nonstop) coughing   Negative: Patient wants to be seen   Negative: Oxygen level (e.g., pulse oximetry) 91 to 94%   Negative: MODERATE longstanding difficulty breathing (e.g., speaks in phrases, SOB even at rest, pulse 100-120) and SAME as normal    Protocols used: Breathing Difficulty-A-OH

## 2024-11-14 ENCOUNTER — OFFICE VISIT (OUTPATIENT)
Dept: FAMILY MEDICINE | Facility: CLINIC | Age: 49
End: 2024-11-14
Payer: COMMERCIAL

## 2024-11-14 ENCOUNTER — ANCILLARY PROCEDURE (OUTPATIENT)
Dept: GENERAL RADIOLOGY | Facility: CLINIC | Age: 49
End: 2024-11-14
Attending: FAMILY MEDICINE
Payer: COMMERCIAL

## 2024-11-14 VITALS
HEIGHT: 68 IN | RESPIRATION RATE: 26 BRPM | OXYGEN SATURATION: 97 % | HEART RATE: 88 BPM | DIASTOLIC BLOOD PRESSURE: 64 MMHG | BODY MASS INDEX: 35.05 KG/M2 | WEIGHT: 231.25 LBS | TEMPERATURE: 97.8 F | SYSTOLIC BLOOD PRESSURE: 128 MMHG

## 2024-11-14 DIAGNOSIS — J45.30 MILD PERSISTENT ASTHMA WITHOUT COMPLICATION: Primary | ICD-10-CM

## 2024-11-14 DIAGNOSIS — M25.561 ACUTE PAIN OF RIGHT KNEE: ICD-10-CM

## 2024-11-14 DIAGNOSIS — E66.01 CLASS 2 SEVERE OBESITY DUE TO EXCESS CALORIES WITH SERIOUS COMORBIDITY IN ADULT, UNSPECIFIED BMI (H): ICD-10-CM

## 2024-11-14 DIAGNOSIS — R06.09 DYSPNEA ON EXERTION: ICD-10-CM

## 2024-11-14 DIAGNOSIS — E66.812 CLASS 2 SEVERE OBESITY DUE TO EXCESS CALORIES WITH SERIOUS COMORBIDITY IN ADULT, UNSPECIFIED BMI (H): ICD-10-CM

## 2024-11-14 DIAGNOSIS — E11.9 TYPE 2 DIABETES MELLITUS WITHOUT COMPLICATION, WITHOUT LONG-TERM CURRENT USE OF INSULIN (H): ICD-10-CM

## 2024-11-14 PROCEDURE — 71046 X-RAY EXAM CHEST 2 VIEWS: CPT | Mod: TC | Performed by: RADIOLOGY

## 2024-11-14 PROCEDURE — 99214 OFFICE O/P EST MOD 30 MIN: CPT | Performed by: FAMILY MEDICINE

## 2024-11-14 PROCEDURE — 73562 X-RAY EXAM OF KNEE 3: CPT | Mod: TC | Performed by: RADIOLOGY

## 2024-11-14 RX ORDER — FLUTICASONE PROPIONATE 110 UG/1
1 AEROSOL, METERED RESPIRATORY (INHALATION) 2 TIMES DAILY
Qty: 12 G | Refills: 1 | Status: SHIPPED | OUTPATIENT
Start: 2024-11-14

## 2024-11-14 ASSESSMENT — ENCOUNTER SYMPTOMS: COUGH: 1

## 2024-11-14 NOTE — LETTER
2024    Dalila Maolne   1975        To Whom it May Concern;    Please excuse Dalila Malone from work/school for a healthcare visit on 2024. Please excuse today  and tomorrow 11/15 due to injury.     Sincerely,        Sandy Henry MD

## 2024-11-14 NOTE — PROGRESS NOTES
"  Assessment & Plan     Type 2 diabetes mellitus without complication, without long-term current use of insulin (H)  Requests refill. A1c at goal.   - empagliflozin (JARDIANCE) 25 MG TABS tablet  Dispense: 90 tablet; Refill: 3    Dyspnea on exertion  Asthma  Reports symptoms increase every winter x 2 years, triggered by cold air and exertion. Normal exam today. Using albuterol 3x/week. CXR today. PFTs advised to confirm diagnosis. Start flovent and follow up in about a month. If PFTs are normal or if controller not helping, recommend work up for other causes of dyspnea.   - General PFT Lab (Please always keep checked)  - Pulmonary Function Test  - XR Chest 2 Views  - fluticasone (FLOVENT HFA) 110 MCG/ACT inhaler  Dispense: 12 g; Refill: 1      Acute pain of right knee  Twisted getting out of car at clinic today. Xray shows small effusion. Recommend he be seen in [place]. If he does not go to [place] today, recommend compression, ice, elevation, and [place] tomorrow if not improving. 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 Contact us if unable to return Monday.    - XR Knee Right 3 Views        BMI  Estimated body mass index is 35.66 kg/m  as calculated from the following:    Height as of this encounter: 1.715 m (5' 7.52\").    Weight as of this encounter: 104.9 kg (231 lb 4 oz).             Subjective   Heu is a 49 year old, presenting for the following health issues:  Cough and Knee Pain (Recent knee surgery)        11/14/2024    10:14 AM   Additional Questions   Roomed by Alexia STRINGER   Accompanied by self     History of Present Illness     Asthma:  He presents for follow up of asthma.  He has no cough, some wheezing, and some shortness of breath.  He is using a relief medication a few times a week. He does not have a controller medication. Patient is aware of the following triggers: cold air and exercise or sports. The patient has not had a visit to the Emergency Room, Urgent Care or " "Hospital due to asthma since the last clinic visit.     Reason for visit:  Breathing    He eats 2-3 servings of fruits and vegetables daily.He consumes 2 sweetened beverage(s) daily.He exercises with enough effort to increase his heart rate 30 to 60 minutes per day.  He exercises with enough effort to increase his heart rate 3 or less days per week.   He is taking medications regularly.     Right knee pain: had knee surgery in July - TKA, was fully recovered and doing well, but today when getting out of car at clinic, he thinks he twisted it, made a sudden move to compensate, and felt worse. Sharp pain deep inside. Hurts to bear weight.     Asthma: out of breath after walking a short distance x 2 years, comes and goes. Worse in winter - worse in cold air.  Inhaler helps. Wheezing comes when mucous builds up - worse in morning, gets better when he clears mucous. Some cough, typically only in winter. Has been using albuterol inhaler a few times a week. Has not had PFTs or spirometry. Has never been on a controller.     No chest pain.     Former smoker - quit 3 years ago. Smoked almost 1/2ppd.                   Objective    /64   Pulse 88   Temp 97.8  F (36.6  C) (Oral)   Resp 26   Ht 1.715 m (5' 7.52\")   Wt 104.9 kg (231 lb 4 oz)   SpO2 97%   BMI 35.66 kg/m    Body mass index is 35.66 kg/m .  Physical Exam   GENERAL: alert and no distress  EYES: Eyes grossly normal to inspection, conjunctivae and sclerae normal  RESP: lungs clear to auscultation - no rales, rhonchi or wheezes  CV: regular rate and rhythm, normal S1 S2, no S3 or S4, no murmur, click or rub, no peripheral edema  MS: right knee swelling, mild warmth, pan w palpation lateral aspect             Signed Electronically by: Sandy Henry MD    "

## 2024-11-29 ENCOUNTER — MYC REFILL (OUTPATIENT)
Dept: FAMILY MEDICINE | Facility: CLINIC | Age: 49
End: 2024-11-29
Payer: COMMERCIAL

## 2024-11-29 DIAGNOSIS — L60.0 INGROWING TOENAIL: ICD-10-CM

## 2024-11-29 DIAGNOSIS — B35.1 ONYCHOMYCOSIS: ICD-10-CM

## 2024-11-29 DIAGNOSIS — E11.9 TYPE 2 DIABETES MELLITUS WITHOUT COMPLICATION, WITHOUT LONG-TERM CURRENT USE OF INSULIN (H): ICD-10-CM

## 2024-12-01 RX ORDER — METFORMIN HYDROCHLORIDE 500 MG/1
500 TABLET, EXTENDED RELEASE ORAL 2 TIMES DAILY WITH MEALS
Qty: 180 TABLET | Refills: 3 | OUTPATIENT
Start: 2024-12-01

## 2024-12-02 RX ORDER — TERBINAFINE HYDROCHLORIDE 250 MG/1
250 TABLET ORAL DAILY
Qty: 90 TABLET | Refills: 0 | Status: SHIPPED | OUTPATIENT
Start: 2024-12-02

## 2024-12-05 ENCOUNTER — THERAPY VISIT (OUTPATIENT)
Dept: PHYSICAL THERAPY | Facility: CLINIC | Age: 49
End: 2024-12-05
Payer: COMMERCIAL

## 2024-12-05 DIAGNOSIS — G89.29 CHRONIC PAIN OF RIGHT KNEE: Primary | ICD-10-CM

## 2024-12-05 DIAGNOSIS — M25.561 CHRONIC PAIN OF RIGHT KNEE: Primary | ICD-10-CM

## 2024-12-19 ENCOUNTER — OFFICE VISIT (OUTPATIENT)
Dept: PULMONOLOGY | Facility: CLINIC | Age: 49
End: 2024-12-19
Attending: FAMILY MEDICINE
Payer: COMMERCIAL

## 2024-12-19 ENCOUNTER — THERAPY VISIT (OUTPATIENT)
Dept: PHYSICAL THERAPY | Facility: CLINIC | Age: 49
End: 2024-12-19
Payer: COMMERCIAL

## 2024-12-19 DIAGNOSIS — M25.561 CHRONIC PAIN OF RIGHT KNEE: Primary | ICD-10-CM

## 2024-12-19 DIAGNOSIS — R06.09 DYSPNEA ON EXERTION: ICD-10-CM

## 2024-12-19 DIAGNOSIS — G89.29 CHRONIC PAIN OF RIGHT KNEE: Primary | ICD-10-CM

## 2024-12-19 LAB
DLCOCOR-%PRED-PRE: 130 %
DLCOCOR-PRE: 35.49 ML/MIN/MMHG
DLCOUNC-%PRED-PRE: 132 %
DLCOUNC-PRE: 35.88 ML/MIN/MMHG
DLCOUNC-PRED: 27.1 ML/MIN/MMHG
ERV-%PRED-PRE: 39 %
ERV-PRE: 0.5 L
ERV-PRED: 1.27 L
EXPTIME-PRE: 8.86 SEC
FEF2575-%PRED-PRE: 78 %
FEF2575-PRE: 2.49 L/SEC
FEF2575-PRED: 3.18 L/SEC
FEFMAX-%PRED-PRE: 97 %
FEFMAX-PRE: 8.98 L/SEC
FEFMAX-PRED: 9.23 L/SEC
FEV1-%PRED-PRE: 92 %
FEV1-PRE: 3.12 L
FEV1FEV6-PRE: 78 %
FEV1FEV6-PRED: 80 %
FEV1FVC-PRE: 76 %
FEV1FVC-PRED: 80 %
FEV1SVC-PRE: 77 %
FEV1SVC-PRED: 68 %
FIFMAX-PRE: 7.1 L/SEC
FRCPLETH-%PRED-PRE: 91 %
FRCPLETH-PRE: 2.94 L
FRCPLETH-PRED: 3.22 L
FVC-%PRED-PRE: 97 %
FVC-PRE: 4.08 L
FVC-PRED: 4.17 L
HGB BLD-MCNC: 15 G/DL
IC-%PRED-PRE: 101 %
IC-PRE: 3.54 L
IC-PRED: 3.51 L
RVPLETH-%PRED-PRE: 115 %
RVPLETH-PRE: 2.43 L
RVPLETH-PRED: 2.1 L
TLCPLETH-%PRED-PRE: 98 %
TLCPLETH-PRE: 6.48 L
TLCPLETH-PRED: 6.57 L
VA-%PRED-PRE: 99 %
VA-PRE: 6.01 L
VC-%PRED-PRE: 82 %
VC-PRE: 4.05 L
VC-PRED: 4.91 L

## 2024-12-19 ASSESSMENT — ACTIVITIES OF DAILY LIVING (ADL)
KNEEL ON THE FRONT OF YOUR KNEE: ACTIVITY IS MINIMALLY DIFFICULT
KNEE_ACTIVITY_OF_DAILY_LIVING_SUM: 65
HOW_WOULD_YOU_RATE_THE_CURRENT_FUNCTION_OF_YOUR_KNEE_DURING_YOUR_USUAL_DAILY_ACTIVITIES_ON_A_SCALE_FROM_0_TO_100_WITH_100_BEING_YOUR_LEVEL_OF_KNEE_FUNCTION_PRIOR_TO_YOUR_INJURY_AND_0_BEING_THE_INABILITY_TO_PERFORM_ANY_OF_YOUR_USUAL_DAILY_ACTIVITIES?: 95
GIVING WAY, BUCKLING OR SHIFTING OF KNEE: I DO NOT HAVE THE SYMPTOM
RISE FROM A CHAIR: ACTIVITY IS NOT DIFFICULT
SWELLING: I DO NOT HAVE THE SYMPTOM
STAND: ACTIVITY IS NOT DIFFICULT
GO UP STAIRS: ACTIVITY IS NOT DIFFICULT
WEAKNESS: I HAVE THE SYMPTOM BUT IT DOES NOT AFFECT MY ACTIVITY
RAW_SCORE: 65
LIMPING: I DO NOT HAVE THE SYMPTOM
WALK: ACTIVITY IS NOT DIFFICULT
AS_A_RESULT_OF_YOUR_KNEE_INJURY,_HOW_WOULD_YOU_RATE_YOUR_CURRENT_LEVEL_OF_DAILY_ACTIVITY?: NORMAL
PAIN: I DO NOT HAVE THE SYMPTOM
HOW_WOULD_YOU_RATE_THE_OVERALL_FUNCTION_OF_YOUR_KNEE_DURING_YOUR_USUAL_DAILY_ACTIVITIES?: NORMAL
GO DOWN STAIRS: ACTIVITY IS MINIMALLY DIFFICULT
STIFFNESS: I HAVE THE SYMPTOM BUT IT DOES NOT AFFECT MY ACTIVITY
SQUAT: ACTIVITY IS MINIMALLY DIFFICULT
KNEE_ACTIVITY_OF_DAILY_LIVING_SCORE: 92.86
SIT WITH YOUR KNEE BENT: ACTIVITY IS NOT DIFFICULT

## 2024-12-19 NOTE — PROGRESS NOTES
12/19/24 0500   Appointment Info   Signing clinician's name / credentials Rocio Parker, ZOIET, OCS, MTC   Total/Authorized Visits 13   Visits Used 13   Medical Diagnosis R TKA   PT Tx Diagnosis R knee pain   Progress Note/Certification   Start of Care Date 07/31/24   Onset of illness/injury or Date of Surgery 07/09/24   Therapy Frequency 1x every other week/every three weeks   Predicted Duration 8 weeks   Certification date from 10/24/24   Certification date to 12/19/24   Progress Note Completed Date 10/24/24   GOALS   PT Goals 2   PT Goal 1   Goal Identifier ambulation   Goal Description pt will demonstrate normal gait mechanics for 100' with no assistive device   Rationale to maximize safety and independence with performance of ADLs and functional tasks;to maximize safety and independence within the home;to maximize safety and independence within the community   Goal Progress min antalgic   Target Date 12/19/24   Date Met 12/19/24   PT Goal 2   Goal Identifier stairs   Goal Description pt will demo full flight of stairs without railing, no knee pain   Rationale to maximize safety and independence with performance of ADLs and functional tasks;to maximize safety and independence within the home;to maximize safety and independence within the community   Goal Progress improving, not as challenging, minimal discomfort   Target Date 12/19/24   Date Met 12/19/24   Subjective Report   Subjective Report Knee is feeling better. Not as sore or stiff   Objective Measures   Objective Measures Objective Measure 1;Objective Measure 2   Objective Measure 2   Objective Measure Knee AROM:   Details 0-125 post manual   Treatment Interventions (PT)   Interventions Therapeutic Procedure/Exercise;Neuromuscular Re-education;Manual Therapy;Therapeutic Activity   Therapeutic Procedure/Exercise   Therapeutic Procedures: strength, endurance, ROM, flexibility minutes (87038) 12   Therapeutic Procedures Ther Proc 2;Ther Proc 3;Ther Proc 4  "  Ther Proc 1 Bike   Ther Proc 1 - Details x5 minutes today, full revolutions   Ther Proc 2 Resisted side stepping   Ther Proc 2 - Details 3x10, GTB   Ther Proc 3 DL leg press 6 plates   Ther Proc 3 - Details eccentric emphasis x15   Skilled Intervention ex selection and education on priorities including ROM, gait, funtional mobility   Patient Response/Progress tolerated well, soreness but no sharp pain   Therapeutic Activity   Therapeutic Activities: dynamic activities to improve functional performance minutes (69471) 15   Therapeutic Activities Ther Act 2;Ther Act 3;Ther Act 4   Ther Act 1 step downs 6\"   Ther Act 1 - Details x 10, cues to improve eccentric control   Ther Act 2 Reverse lunge holds with emphasis on eccentrics   Ther Act 2 - Details working on + knee shin angle and R LE eccentric control/drive x10, fatigued   Skilled Intervention direct supervision/education provided on tasks below, in order to improve independence with functional activities in home and community   Patient Response/Progress fatigued   Manual Therapy   Manual Therapy: Mobilization, MFR, MLD, friction massage minutes (84100) 10   Manual Therapy Manual Therapy 2;Manual Therapy 3   Manual Therapy 1 Contract relax for quad   Manual Therapy 1 - Details in prone, x 2 rounds   Manual Therapy 2 STM   Manual Therapy 2 - Details emphasis on inferior quad, scar tissue mobs completed with and without romana alva   Skilled Intervention intended to increase local circulation, reduce pain, as well as to reduce muscle stiffness and or improve joint mobility   Patient Response/Progress reduced anterior knee pain, improved knee ext   Education   Learner/Method Patient;No Barriers to Learning   Education Comments educated on diagnosis, prognosis, expectations of therapy, and importance of movement   Comments   Comments discharge - all goals achieved   Total Session Time   Timed Code Treatment Minutes 37   Total Treatment Time (sum of timed and untimed " services) 37         DISCHARGE  Reason for Discharge: Patient has met all goals.    Equipment Issued: TB and HEP    Discharge Plan: Patient to continue home program.    Referring Provider:  Mika Henry

## 2024-12-23 ENCOUNTER — TELEPHONE (OUTPATIENT)
Dept: FAMILY MEDICINE | Facility: CLINIC | Age: 49
End: 2024-12-23
Payer: COMMERCIAL

## 2024-12-23 NOTE — TELEPHONE ENCOUNTER
----- Message from Sandy Henry sent at 12/23/2024  8:26 AM CST -----  Lung/breathing tests are normal, which makes asthma a less likely cause of his shortness of breath. I recommend looking for other causes, such as his heart. I have ordered a stress test (he will walk on a treadmill while hooked up to monitors for his heart) and ultrasound of heart (echocardiogram).

## 2024-12-24 ENCOUNTER — TELEPHONE (OUTPATIENT)
Dept: FAMILY MEDICINE | Facility: CLINIC | Age: 49
End: 2024-12-24
Payer: COMMERCIAL

## 2024-12-24 NOTE — LETTER
December 24, 2024      Dalila Malone  [address] [place] [address]  [place] [address]        Dear ,    We are writing to inform you of your test results.    Lung/breathing tests are normal, which makes asthma a less likely cause of his shortness of breath. I recommend looking for other causes, such as his heart. I have ordered a stress test (he will walk on a treadmill while hooked up to monitors for his heart) and ultrasound of heart (echocardiogram).     Resulted Orders   General PFT Lab (Please always keep checked)   Result Value Ref Range    FVC-Pred 4.17 L    FVC-Pre 4.08 L    FVC-%Pred-Pre 97 %    FEV1-Pre 3.12 L    FEV1-%Pred-Pre 92 %    FEV1FVC-Pred 80 %    FEV1FVC-Pre 76 %    FEFMax-Pred 9.23 L/sec    FEFMax-Pre 8.98 L/sec    FEFMax-%Pred-Pre 97 %    FEF2575-Pred 3.18 L/sec    FEF2575-Pre 2.49 L/sec    GDA8200-%Pred-Pre 78 %    ExpTime-Pre 8.86 sec    FIFMax-Pre 7.10 L/sec    VC-Pred 4.91 L    VC-Pre 4.05 L    VC-%Pred-Pre 82 %    IC-Pred 3.51 L    IC-Pre 3.54 L    IC-%Pred-Pre 101 %    ERV-Pred 1.27 L    ERV-Pre 0.50 L    ERV-%Pred-Pre 39 %    FEV1FEV6-Pred 80 %    FEV1FEV6-Pre 78 %    FRCPleth-Pred 3.22 L    FRCPleth-Pre 2.94 L    FRCPleth-%Pred-Pre 91 %    RVPleth-Pred 2.10 L    RVPleth-Pre 2.43 L    RVPleth-%Pred-Pre 115 %    TLCPleth-Pred 6.57 L    TLCPleth-Pre 6.48 L    TLCPleth-%Pred-Pre 98 %    DLCOunc-Pred 27.10 ml/min/mmHg    DLCOunc-Pre 35.88 ml/min/mmHg    DLCOunc-%Pred-Pre 132 %    DLCOcor-Pre 35.49 ml/min/mmHg    DLCOcor-%Pred-Pre 130 %    VA-Pre 6.01 L    VA-%Pred-Pre 99 %    FEV1SVC-Pred 68 %    FEV1SVC-Pre 77 %    Narrative    FEV1/FVC 76, normal    FEV1 3.12L, -0.54 Z-score, normal    FVC 4.08L, -0.19 Z-score, normal    Normal flow volume loop    TLC 6.48L, -0.13 Z-score, normal    RV/TLC +0.76 Z-score, normal    DLCO when corrected for hemoglobin +1.84 Z-score, increased    Impression: normal pulmonary function testing.        Ramonita Vance MD              This interpretation has been  electronically signed:  Ramonita Vance 12/20/2024  11:34:11 AM         If you have any questions or concerns, please call the clinic at the number listed above.       Sincerely,          Electronically signed

## 2025-01-04 ENCOUNTER — MYC REFILL (OUTPATIENT)
Dept: FAMILY MEDICINE | Facility: CLINIC | Age: 50
End: 2025-01-04
Payer: COMMERCIAL

## 2025-01-04 DIAGNOSIS — M17.30 POST-TRAUMATIC OSTEOARTHRITIS OF KNEE, UNSPECIFIED LATERALITY: ICD-10-CM

## 2025-01-04 DIAGNOSIS — B35.1 ONYCHOMYCOSIS: ICD-10-CM

## 2025-01-04 DIAGNOSIS — L60.0 INGROWING TOENAIL: ICD-10-CM

## 2025-01-04 RX ORDER — TERBINAFINE HYDROCHLORIDE 250 MG/1
250 TABLET ORAL DAILY
Qty: 90 TABLET | Refills: 0 | OUTPATIENT
Start: 2025-01-04

## 2025-01-04 RX ORDER — IBUPROFEN 800 MG/1
800 TABLET, FILM COATED ORAL EVERY 8 HOURS PRN
Qty: 90 TABLET | Refills: 11 | OUTPATIENT
Start: 2025-01-04

## 2025-01-17 SDOH — HEALTH STABILITY: PHYSICAL HEALTH: ON AVERAGE, HOW MANY DAYS PER WEEK DO YOU ENGAGE IN MODERATE TO STRENUOUS EXERCISE (LIKE A BRISK WALK)?: 1 DAY

## 2025-01-17 SDOH — HEALTH STABILITY: PHYSICAL HEALTH: ON AVERAGE, HOW MANY MINUTES DO YOU ENGAGE IN EXERCISE AT THIS LEVEL?: 30 MIN

## 2025-01-17 ASSESSMENT — ASTHMA QUESTIONNAIRES
QUESTION_2 LAST FOUR WEEKS HOW OFTEN HAVE YOU HAD SHORTNESS OF BREATH: ONCE OR TWICE A WEEK
ACT_TOTALSCORE: 19
ACT_TOTALSCORE: 19
QUESTION_5 LAST FOUR WEEKS HOW WOULD YOU RATE YOUR ASTHMA CONTROL: WELL CONTROLLED
QUESTION_3 LAST FOUR WEEKS HOW OFTEN DID YOUR ASTHMA SYMPTOMS (WHEEZING, COUGHING, SHORTNESS OF BREATH, CHEST TIGHTNESS OR PAIN) WAKE YOU UP AT NIGHT OR EARLIER THAN USUAL IN THE MORNING: ONCE A WEEK
QUESTION_1 LAST FOUR WEEKS HOW MUCH OF THE TIME DID YOUR ASTHMA KEEP YOU FROM GETTING AS MUCH DONE AT WORK, SCHOOL OR AT HOME: A LITTLE OF THE TIME
QUESTION_4 LAST FOUR WEEKS HOW OFTEN HAVE YOU USED YOUR RESCUE INHALER OR NEBULIZER MEDICATION (SUCH AS ALBUTEROL): ONCE A WEEK OR LESS

## 2025-01-17 ASSESSMENT — SOCIAL DETERMINANTS OF HEALTH (SDOH): HOW OFTEN DO YOU GET TOGETHER WITH FRIENDS OR RELATIVES?: ONCE A WEEK

## 2025-01-21 ASSESSMENT — PATIENT HEALTH QUESTIONNAIRE - PHQ9
SUM OF ALL RESPONSES TO PHQ QUESTIONS 1-9: 8
SUM OF ALL RESPONSES TO PHQ QUESTIONS 1-9: 8
10. IF YOU CHECKED OFF ANY PROBLEMS, HOW DIFFICULT HAVE THESE PROBLEMS MADE IT FOR YOU TO DO YOUR WORK, TAKE CARE OF THINGS AT HOME, OR GET ALONG WITH OTHER PEOPLE: NOT DIFFICULT AT ALL

## 2025-01-22 ENCOUNTER — OFFICE VISIT (OUTPATIENT)
Dept: FAMILY MEDICINE | Facility: CLINIC | Age: 50
End: 2025-01-22
Attending: FAMILY MEDICINE
Payer: COMMERCIAL

## 2025-01-22 VITALS
TEMPERATURE: 97.2 F | SYSTOLIC BLOOD PRESSURE: 134 MMHG | RESPIRATION RATE: 16 BRPM | BODY MASS INDEX: 36.08 KG/M2 | OXYGEN SATURATION: 97 % | HEART RATE: 64 BPM | HEIGHT: 68 IN | DIASTOLIC BLOOD PRESSURE: 84 MMHG | WEIGHT: 238.08 LBS

## 2025-01-22 DIAGNOSIS — Z11.4 SCREENING FOR HIV (HUMAN IMMUNODEFICIENCY VIRUS): ICD-10-CM

## 2025-01-22 DIAGNOSIS — M17.30 POST-TRAUMATIC OSTEOARTHRITIS OF KNEE, UNSPECIFIED LATERALITY: ICD-10-CM

## 2025-01-22 DIAGNOSIS — E66.01 SEVERE OBESITY (BMI 35.0-35.9 WITH COMORBIDITY) (H): ICD-10-CM

## 2025-01-22 DIAGNOSIS — Z96.651 S/P TKR (TOTAL KNEE REPLACEMENT), RIGHT: ICD-10-CM

## 2025-01-22 DIAGNOSIS — E11.9 TYPE 2 DIABETES MELLITUS WITHOUT COMPLICATION, WITHOUT LONG-TERM CURRENT USE OF INSULIN (H): Primary | ICD-10-CM

## 2025-01-22 DIAGNOSIS — J45.20 MILD INTERMITTENT ASTHMA WITHOUT COMPLICATION: ICD-10-CM

## 2025-01-22 LAB
EST. AVERAGE GLUCOSE BLD GHB EST-MCNC: 151 MG/DL
HBA1C MFR BLD: 6.9 % (ref 0–5.6)

## 2025-01-22 PROCEDURE — G2211 COMPLEX E/M VISIT ADD ON: HCPCS | Performed by: FAMILY MEDICINE

## 2025-01-22 PROCEDURE — 99214 OFFICE O/P EST MOD 30 MIN: CPT | Performed by: FAMILY MEDICINE

## 2025-01-22 PROCEDURE — 82043 UR ALBUMIN QUANTITATIVE: CPT | Performed by: FAMILY MEDICINE

## 2025-01-22 PROCEDURE — 87389 HIV-1 AG W/HIV-1&-2 AB AG IA: CPT | Performed by: FAMILY MEDICINE

## 2025-01-22 PROCEDURE — 36415 COLL VENOUS BLD VENIPUNCTURE: CPT | Performed by: FAMILY MEDICINE

## 2025-01-22 PROCEDURE — 82570 ASSAY OF URINE CREATININE: CPT | Performed by: FAMILY MEDICINE

## 2025-01-22 PROCEDURE — 83036 HEMOGLOBIN GLYCOSYLATED A1C: CPT | Performed by: FAMILY MEDICINE

## 2025-01-22 RX ORDER — METFORMIN HYDROCHLORIDE 500 MG/1
1000 TABLET, EXTENDED RELEASE ORAL 2 TIMES DAILY WITH MEALS
Qty: 360 TABLET | Refills: 3 | Status: SHIPPED | OUTPATIENT
Start: 2025-01-22

## 2025-01-22 RX ORDER — BUDESONIDE AND FORMOTEROL FUMARATE DIHYDRATE 160; 4.5 UG/1; UG/1
2 AEROSOL RESPIRATORY (INHALATION)
Qty: 10.2 G | Refills: 3 | Status: SHIPPED | OUTPATIENT
Start: 2025-01-22

## 2025-01-22 NOTE — PATIENT INSTRUCTIONS
Increase metformin to 1,000 mg (two pills) TWICE DAILY.     New inhaler Symbicort- 2 puffs twice per day and ok to take extra if you need it.

## 2025-01-22 NOTE — PROGRESS NOTES
"Preventive Care Visit  [facility] TANISHACarondelet HealthCULLEN Banks MD, Family Medicine  Jan 22, 2025      Assessment & Plan     Type 2 diabetes mellitus without complication, without long-term current use of insulin (H):  A1c is 6.9, up from 6.3 4 months ago. Will increase metformin to 1,000 mg BID. Continue jardiance also.   - Albumin Random Urine Quantitative with Creat Ratio  - Hemoglobin A1c  - metFORMIN (GLUCOPHAGE XR) 500 MG 24 hr tablet  Dispense: 360 tablet; Refill: 3    Screening for HIV (human immunodeficiency virus)  - HIV Antigen Antibody Combo    Severe obesity (BMI 35.0-35.9 with comorbidity) (H): will try to increase physical activity though is is difficult with knee pain (see below).     Mild intermittent asthma without complication: ACT score is 19 and he is taking sometimes Flovent or sometimes albuterol PRN. Will start symbicort instead, 2 puffs BID and also to use PRN per OFE guidelines. Explained this.    - budesonide-formoterol (SYMBICORT) 160-4.5 MCG/ACT Inhaler  Dispense: 10.2 g; Refill: 3    Posttraumatic osteoarthritis of right knee: still having ongoing pain and swelling even despite doing PT exercises at home. Has been 6 months since TKA. I advised going back to Ortho for further recs.     S/p total knee replacement, right;      Patient has been advised of split billing requirements and indicates understanding: Yes        BMI  Estimated body mass index is 36.72 kg/m  as calculated from the following:    Height as of this encounter: 1.715 m (5' 7.52\").    Weight as of this encounter: 108 kg (238 lb 1.3 oz).       Counseling  Appropriate preventive services were addressed with this patient via screening, questionnaire, or discussion as appropriate for fall prevention, nutrition, physical activity, Tobacco-use cessation, social engagement, weight loss and cognition.  Checklist reviewing preventive services available has been given to the patient.  Reviewed patient's diet, addressing " concerns and/or questions.   He is at risk for lack of exercise and has been provided with information to increase physical activity for the benefit of his well-being.   The patient's PHQ-9 score is consistent with mild depression. He was provided with information regarding depression.       Aruna Conner is a 49 year old, presenting for the following:  Physical (Questions about Surgery knee )        1/22/2025    10:23 AM   Additional Questions   Roomed by VICTOR MANUEL LARA MA   Accompanied by SELF          HPIAnswers submitted by the patient for this visit:  Patient Health Questionnaire (Submitted on 1/21/2025)  If you checked off any problems, how difficult have these problems made it for you to do your work, take care of things at home, or get along with other people?: Not difficult at all  PHQ9 TOTAL SCORE: 8    Gets short of breath after walking for several minutes at work. Takes flovent or albuterol inhaler and this helps for several hours.     Right knee is still painful, 6 months after TKA. He is doing his PT exercises .notices the knee is still swollen as well. Wondering if it should be improving more by now.       Health Care Directive  Patient does not have a Health Care Directive: Discussed advance care planning with patient; information given to patient to review.      1/17/2025   General Health   How would you rate your overall physical health? Good   Feel stress (tense, anxious, or unable to sleep) Only a little   (!) STRESS CONCERN      1/17/2025   Nutrition   Three or more servings of calcium each day? (!) NO   Diet: Low salt    Breakfast skipped   How many servings of fruit and vegetables per day? (!) 0-1   How many sweetened beverages each day? 0-1       Multiple values from one day are sorted in reverse-chronological order         1/17/2025   Exercise   Days per week of moderate/strenous exercise 1 day   Average minutes spent exercising at this level 30 min   (!) EXERCISE CONCERN      1/17/2025   Social  Factors   Frequency of gathering with friends or relatives Once a week   Worry food won't last until get money to buy more No   Food not last or not have enough money for food? No   Do you have housing? (Housing is defined as stable permanent housing and does not include staying ouside in a car, in a tent, in an abandoned building, in an overnight shelter, or couch-surfing.) Yes   Are you worried about losing your housing? No   Lack of transportation? No   Unable to get utilities (heat,electricity)? No         1/17/2025   Dental   Dentist two times every year? Yes         1/17/2025   TB Screening   Were you born outside of the [place]? Yes       Today's PHQ-9 Score:       1/21/2025     2:32 PM   PHQ-9 SCORE   PHQ-9 Total Score MyChart 8 (Mild depression)   PHQ-9 Total Score 8        Patient-reported         1/17/2025   Substance Use   Alcohol more than 3/day or more than 7/wk No   Do you use any other substances recreationally? No     Social History     Tobacco Use    Smoking status: Former     Current packs/day: 0.00     Average packs/day: 0.5 packs/day for 25.6 years (12.8 ttl pk-yrs)     Types: Cigarettes     Start date: 1/1/1996     Quit date: 8/4/2021     Years since quitting: 3.4     Passive exposure: Never    Smokeless tobacco: Former   Vaping Use    Vaping status: Never Used   Substance Use Topics    Alcohol use: Not Currently     Comment: Alcoholic Drinks/day: occasional    Drug use: Never             1/17/2025   One time HIV Screening   Previous HIV test? No         1/17/2025   STI Screening   New sexual partner(s) since last STI/HIV test? No   ASCVD Risk   The 10-year ASCVD risk score (Zane BARRON, et al., 2019) is: 10.4%    Values used to calculate the score:      Age: 49 years      Sex: Male      Is Non- : No      Diabetic: Yes      Tobacco smoker: No      Systolic Blood Pressure: 134 mmHg      Is BP treated: Yes      HDL Cholesterol: 35 mg/dL      Total Cholesterol: 193  "mg/dL        1/17/2025   Contraception/Family Planning   Questions about contraception or family planning No        Reviewed and updated as needed this visit by Provider                    Past Medical History:   Diagnosis Date    Arthritis 02/04/2021    Knees, elbows, and ankle hurt.    Diabetes (H)     Hypertension 2/12/2019    Medical history non-contributory     Uncomplicated asthma 09/01/2021    Tightness in the chest         Review of Systems  Constitutional, HEENT, cardiovascular, pulmonary, gi and gu systems are negative, except as otherwise noted.     Objective    Exam  /84   Pulse 64   Temp 97.2  F (36.2  C) (Temporal)   Resp 16   Ht 1.715 m (5' 7.52\")   Wt 108 kg (238 lb 1.3 oz)   SpO2 97%   BMI 36.72 kg/m     Estimated body mass index is 36.72 kg/m  as calculated from the following:    Height as of this encounter: 1.715 m (5' 7.52\").    Weight as of this encounter: 108 kg (238 lb 1.3 oz).    Physical Exam  GENERAL: alert and no distress  EYES: Eyes grossly normal to inspection, PERRL and conjunctivae and sclerae normal  HENT: ear canals and TM's normal, nose and mouth without ulcers or lesions  NECK: no adenopathy, no asymmetry, masses, or scars  RESP: lungs clear to auscultation - no rales, rhonchi or wheezes  CV: regular rate and rhythm, normal S1 S2, no S3 or S4, no murmur, click or rub, no peripheral edema  ABDOMEN: soft, nontender, no hepatosplenomegaly, no masses and bowel sounds normal  MS: right knee is swollen compared to left and does not have full ROM.   SKIN: no suspicious lesions or rashes  NEURO: Normal strength and tone, mentation intact and speech normal  PSYCH: mentation appears normal, affect normal/bright  Diabetic foot exam: normal DP and PT pulses, no trophic changes or ulcerative lesions, and normal sensory exam        Signed Electronically by: Elina Banks MD    "

## 2025-01-23 LAB
CREAT UR-MCNC: 61.3 MG/DL
HIV 1+2 AB+HIV1 P24 AG SERPL QL IA: NONREACTIVE
MICROALBUMIN UR-MCNC: <12 MG/L
MICROALBUMIN/CREAT UR: NORMAL MG/G{CREAT}

## 2025-02-06 ENCOUNTER — MYC REFILL (OUTPATIENT)
Dept: FAMILY MEDICINE | Facility: CLINIC | Age: 50
End: 2025-02-06
Payer: COMMERCIAL

## 2025-02-06 DIAGNOSIS — L60.0 INGROWING TOENAIL: ICD-10-CM

## 2025-02-06 DIAGNOSIS — B35.1 ONYCHOMYCOSIS: ICD-10-CM

## 2025-02-06 RX ORDER — TERBINAFINE HYDROCHLORIDE 250 MG/1
250 TABLET ORAL DAILY
Qty: 90 TABLET | Refills: 0 | Status: SHIPPED | OUTPATIENT
Start: 2025-02-06

## 2025-02-23 ENCOUNTER — MYC REFILL (OUTPATIENT)
Dept: FAMILY MEDICINE | Facility: CLINIC | Age: 50
End: 2025-02-23
Payer: COMMERCIAL

## 2025-02-23 DIAGNOSIS — E11.9 TYPE 2 DIABETES MELLITUS WITHOUT COMPLICATION, WITHOUT LONG-TERM CURRENT USE OF INSULIN (H): ICD-10-CM

## 2025-02-23 DIAGNOSIS — E78.1 HYPERTRIGLYCERIDEMIA: ICD-10-CM

## 2025-02-23 DIAGNOSIS — I10 BENIGN ESSENTIAL HYPERTENSION: ICD-10-CM

## 2025-02-24 RX ORDER — PRAVASTATIN SODIUM 40 MG
40 TABLET ORAL DAILY
Qty: 90 TABLET | Refills: 3 | OUTPATIENT
Start: 2025-02-24

## 2025-02-24 RX ORDER — LOSARTAN POTASSIUM 50 MG/1
75 TABLET ORAL DAILY
Qty: 135 TABLET | Refills: 3 | OUTPATIENT
Start: 2025-02-24

## 2025-03-15 ENCOUNTER — HEALTH MAINTENANCE LETTER (OUTPATIENT)
Age: 50
End: 2025-03-15

## 2025-03-31 ASSESSMENT — ASTHMA QUESTIONNAIRES
QUESTION_4 LAST FOUR WEEKS HOW OFTEN HAVE YOU USED YOUR RESCUE INHALER OR NEBULIZER MEDICATION (SUCH AS ALBUTEROL): TWO OR THREE TIMES PER WEEK
QUESTION_5 LAST FOUR WEEKS HOW WOULD YOU RATE YOUR ASTHMA CONTROL: WELL CONTROLLED
QUESTION_1 LAST FOUR WEEKS HOW MUCH OF THE TIME DID YOUR ASTHMA KEEP YOU FROM GETTING AS MUCH DONE AT WORK, SCHOOL OR AT HOME: A LITTLE OF THE TIME
QUESTION_2 LAST FOUR WEEKS HOW OFTEN HAVE YOU HAD SHORTNESS OF BREATH: THREE TO SIX TIMES A WEEK
ACT_TOTALSCORE: 19
QUESTION_3 LAST FOUR WEEKS HOW OFTEN DID YOUR ASTHMA SYMPTOMS (WHEEZING, COUGHING, SHORTNESS OF BREATH, CHEST TIGHTNESS OR PAIN) WAKE YOU UP AT NIGHT OR EARLIER THAN USUAL IN THE MORNING: NOT AT ALL

## 2025-04-01 ENCOUNTER — OFFICE VISIT (OUTPATIENT)
Dept: FAMILY MEDICINE | Facility: CLINIC | Age: 50
End: 2025-04-01
Payer: COMMERCIAL

## 2025-04-01 ENCOUNTER — TELEPHONE (OUTPATIENT)
Dept: FAMILY MEDICINE | Facility: CLINIC | Age: 50
End: 2025-04-01

## 2025-04-01 VITALS
RESPIRATION RATE: 15 BRPM | DIASTOLIC BLOOD PRESSURE: 77 MMHG | HEIGHT: 67 IN | BODY MASS INDEX: 38.14 KG/M2 | SYSTOLIC BLOOD PRESSURE: 119 MMHG | HEART RATE: 73 BPM | OXYGEN SATURATION: 100 % | WEIGHT: 243 LBS | TEMPERATURE: 98 F

## 2025-04-01 DIAGNOSIS — E11.9 TYPE 2 DIABETES MELLITUS WITHOUT COMPLICATION, WITHOUT LONG-TERM CURRENT USE OF INSULIN (H): ICD-10-CM

## 2025-04-01 DIAGNOSIS — M79.675 PAIN OF TOE OF LEFT FOOT: Primary | ICD-10-CM

## 2025-04-01 PROCEDURE — 99213 OFFICE O/P EST LOW 20 MIN: CPT | Performed by: FAMILY MEDICINE

## 2025-04-01 PROCEDURE — 3078F DIAST BP <80 MM HG: CPT | Performed by: FAMILY MEDICINE

## 2025-04-01 PROCEDURE — 3074F SYST BP LT 130 MM HG: CPT | Performed by: FAMILY MEDICINE

## 2025-04-01 NOTE — PATIENT INSTRUCTIONS
-[facility]  -It was a pleasure to see you today.  -Please take a look at the information below for more specific details regarding the treatment plan and recommendations.  -In this after visit summary is a list of your medications and specific instructions.  Please review this carefully as there may be changes made to your medication list.  -If there are any particular questions or concerns, please feel free to reach out to Dr. Gonzalez.  -If any labs have been completed, we will reach out to you about results.  If the results are normal or not concerning, a letter or [employer] message will be sent to you.  If any follow-up is needed, either Dr. Gonzalez or the nurse will give you a call.  If you have not heard regarding results after 2 weeks, please reach out to the clinic.    Patient Instructions:    -Use the medications as prescribed.   -Do the exercises as given.    -Take ibuprofen/Advil 600 mg once every 6 hours as needed for pain.  Daily long-term use for ibuprofen is not recommended due to potential side effects.  -Take acetaminophen/Tylenol 1000 mg once every 8 hours as needed for pain.    -Rest and limit usage of the affected area.  -Try to remain active and limit your activity based on discomfort.  -Apply a cold pack to the affected area for a maximum of 20 minutes at a time, once an hour as needed for pain and swelling.  Application of the cold pack for more than 20 minutes can increase risk of injuries to surrounding tissue.  -Apply a warm pack to the affected area as needed for discomforts.  The warm pack will help to improve blood flow and relax surrounding tissues.  You may make your own warm pack by doing the following: Take a sock, fill the sock with uncooked rice, and tie it off at the opened end.  You may place the sock and rice in the microwave for 30-60 seconds at a time or until warm.  Be cautious that the sock/rice is not too hot.  -Do stretches to  help relax the surrounding muscles.  When doing stretches, be sure to hold your body in that position (avoid moving) and hold the stretch for 30 seconds.       Please seek immediate medical attention (go to the emergency room or urgent care) for the following reasons: worsening symptoms, redness, swelling, fevers/chills, or any concerning changes.      --------------------------------------------------------------------------------------------------------------------    -We are always looking for ways to improve.  You may be selected to receive a survey regarding your visit today.  We encourage you to complete the survey and provide specific, constructive feedback to help us improve our processes.  Thank you for your time!  -Please review the contact information listed on the after visit summary and in the electronic chart.  Below is the phone number that we have on file.  If there are any changes that are needed to be made, please reach out to the clinic.  [phone] (home)

## 2025-04-01 NOTE — TELEPHONE ENCOUNTER
Provider is unable to find the lidocaine 4% cream ordered in the EHR system.  Can pharmacy to take verbal?  Prescription will be for lidocaine 4% cream to be used 2 times daily as needed for burning/pain sensations.  Dispense 85 g tube, 0 refills.    Esdras Gonzalez MD  [facility] [place] [address]  Phone: [phone]  Fax: [phone]    4/1/2025  3:40 PM

## 2025-04-01 NOTE — TELEPHONE ENCOUNTER
Called to [facility] on [address] in [place], spoke with Pharmacist, Jess. Relayed verbal order for prescription:    Prescription will be for lidocaine 4% cream to be used 2 times daily as needed for burning/pain sensations.  Dispense 85 g tube, 0 refills      Pharmacist took Dr. Gonzalez's verbal order for RX. No further actions needed.       Shital Thomas, MSN, RN   [facility]

## 2025-04-01 NOTE — PROGRESS NOTES
OFFICE VISIT    Assessment/Plan:     Patient Instructions:    -Use the medications as prescribed.   -Do the exercises as given.    -Take ibuprofen/Advil 600 mg once every 6 hours as needed for pain.  Daily long-term use for ibuprofen is not recommended due to potential side effects.  -Take acetaminophen/Tylenol 1000 mg once every 8 hours as needed for pain.    -Rest and limit usage of the affected area.  -Try to remain active and limit your activity based on discomfort.  -Apply a cold pack to the affected area for a maximum of 20 minutes at a time, once an hour as needed for pain and swelling.  Application of the cold pack for more than 20 minutes can increase risk of injuries to surrounding tissue.  -Apply a warm pack to the affected area as needed for discomforts.  The warm pack will help to improve blood flow and relax surrounding tissues.  You may make your own warm pack by doing the following: Take a sock, fill the sock with uncooked rice, and tie it off at the opened end.  You may place the sock and rice in the microwave for 30-60 seconds at a time or until warm.  Be cautious that the sock/rice is not too hot.  -Do stretches to help relax the surrounding muscles.  When doing stretches, be sure to hold your body in that position (avoid moving) and hold the stretch for 30 seconds.       Please seek immediate medical attention (go to the emergency room or urgent care) for the following reasons: worsening symptoms, redness, swelling, fevers/chills, or any concerning changes.      Heu was seen today for left foot toe pain.  Diagnoses and all orders for this visit:    Pain of toe of left foot  Comments:  Fourth digit on extension of knee and plantar flexion of foot.  Type 2 diabetes mellitus without complication, without long-term current use of insulin (H): likely tendon injury. Symptoms noted only on full extension of knee and plantar flexion of foot. Plan to treat conservatively. Medications prescribed as below.  Home exercises given.   -     diclofenac (VOLTAREN) 1 % topical gel; Apply 1 g topically 4 times daily as needed (burning/pain sensations (avoid face/groin/armpits)).  -     Lidocaine HCl 5 % GEL; Externally apply 1 g topically 2 times daily as needed (pain/burning sensations).        Return in about 1 month (around 5/1/2025), or if symptoms worsen or fail to improve.    The diagnoses, treatment options, risk, benefits, and recommendations were reviewed with patient/guardian.  Questions were answered to patient's/guardian satisfaction.  Red flag signs were reviewed.  Patient/guardian is in agreement with above plan.      Subjective: 50 year old male with history of ingrowing toenail and onychomycosis (bilateral), posttraumatic osteoarthritis of knee chronic right knee pain s/p total knee replacement, morbid obesity, diabetes mellitus type 2, FLYNN, hypertension, mild intermittent asthma who presents to clinic for the following complaints:   Patient presents with:  LEFT FOOT TOE PAIN    Answers submitted by the patient for this visit:  General Questionnaire (Submitted on 3/31/2025)  Chief Complaint: Chronic problems general questions HPI Form  How many days per week do you miss taking your medication?: 0  General Concern (Submitted on 3/31/2025)  Chief Complaint: Chronic problems general questions HPI Form  What is the reason for your visit today?: Toe on left foot hurts when stretched  When did your symptoms begin?: 1-2 weeks ago  What are your symptoms?: Burning sensation  How would you describe these symptoms?: Severe  Are your symptoms:: Staying the same  Have you had these symptoms before?: No  Is there anything that makes you feel worse?: Stretching  Is there anything that makes you feel better?: Nothing  Questionnaire about: Chronic problems general questions HPI Form (Submitted on 3/31/2025)  Chief Complaint: Chronic problems general questions HPI Form    Symptoms started about 1-2 weeks ago. When he sits and  "extends his leg, he has pain in the toes. It feels like there is a burning sensation and pain. Things that make symptoms better include nothing, other not doing the above motions. Stretching the toe makes the symptoms worse. Denies any redness, swelling, increased warmth, pus, discharge, falls, injuries, or other changes from baseline.      The 10 point review of system is negative except as stated in the HPI.    Allergies were reviewed and updated.    Objective:   /77   Pulse 73   Temp 98  F (36.7  C) (Oral)   Resp 15   Ht 1.702 m (5' 7\")   Wt 110.2 kg (243 lb)   SpO2 100%   BMI 38.06 kg/m    General: Active, alert, nontoxic-appearing.  No acute distress.  HEENT: Normocephalic, atraumatic.  Pupils are equal and round.  Sclera is clear.  Normal external ears. Nares patent.  Moist mucous membranes.    Cardiac: Normal skin tone.  Cap refill less than 2 seconds.  Respiratory/chest: Speaking full sentences.  Breathing is not labored.  No accessory muscle usage.  Extremities: Left lower extremity: On full extension of the knee and plantarflexion of the foot, patient endorses burning and painful sensations on the fourth digit.  Palpation examination of the foot, ankle, and lower leg otherwise is within normal limits. RLE: WNL.  Voluntary movements intact.  Integumentary: No concerning rash or skin changes appreciated.        Esdras Gonzalez MD  [facility] [place] [address]  Phone: [phone]  Fax: [phone]    4/3/2025  11:42 PM            Current Outpatient Medications   Medication Sig Dispense Refill    acetaminophen (TYLENOL) 500 MG tablet Take 1-2 tablets (500-1,000 mg) by mouth every 6 hours as needed for mild pain. 100 tablet 0    aspirin-acetaminophen-caffeine (EXCEDRIN MIGRAINE) 250-250-65 MG tablet Take 2 tablets by mouth daily as needed for headaches 60 tablet 1    blood glucose (NO BRAND SPECIFIED) test strip Use to test blood sugar 1 times daily or as directed. " To accompany: Blood Glucose Monitor Brands: per insurance. 100 strip 6    blood glucose monitoring (NO BRAND SPECIFIED) meter device kit Use to test blood sugar 1 times daily or as directed. Preferred blood glucose meter OR supplies to accompany: Blood Glucose Monitor Brands: per insurance. 1 kit 0    budesonide-formoterol (SYMBICORT) 160-4.5 MCG/ACT Inhaler Inhale 2 puffs into the lungs two times daily. Ok to take extra times for shortness of breath when needed. 10.2 g 3    cyclobenzaprine (FLEXERIL) 5 MG tablet Take 1 tablet (5 mg) by mouth nightly as needed for muscle spasms 30 tablet 0    diclofenac (VOLTAREN) 1 % topical gel Apply 1 g topically 4 times daily as needed (burning/pain sensations (avoid face/groin/armpits)). 50 g 0    empagliflozin (JARDIANCE) 25 MG TABS tablet Take 1 tablet (25 mg) by mouth daily. 90 tablet 3    ibuprofen (ADVIL/MOTRIN) 800 MG tablet Take 1 tablet (800 mg) by mouth every 8 hours as needed for fever. 90 tablet 11    Lidocaine HCl 5 % GEL Externally apply 1 g topically 2 times daily as needed (pain/burning sensations). 85 g 0    losartan (COZAAR) 50 MG tablet Take 1.5 tablets (75 mg) by mouth daily. 135 tablet 3    meclizine (ANTIVERT) 12.5 MG tablet Take 1 tablet (12.5 mg) by mouth 4 times daily as needed for dizziness. 30 tablet 1    metFORMIN (GLUCOPHAGE XR) 500 MG 24 hr tablet Take 2 tablets (1,000 mg) by mouth 2 times daily (with meals). 360 tablet 3    pravastatin (PRAVACHOL) 40 MG tablet Take 1 tablet (40 mg) by mouth daily 90 tablet 3    terbinafine (LAMISIL) 250 MG tablet Take 1 tablet (250 mg) by mouth daily. 90 tablet 0    thin (NO BRAND SPECIFIED) lancets Use with lanceting device. To accompany: Blood Glucose Monitor Brands: per insurance. 100 each 6     No current facility-administered medications for this visit.       No Known Allergies    Patient Active Problem List    Diagnosis Date Noted    Mild intermittent asthma without complication 01/22/2025     Priority:  Medium    S/P TKR (total knee replacement), right 01/22/2025     Priority: Medium    Class 2 severe obesity due to excess calories with serious comorbidity in adult (H) 11/14/2024     Priority: Medium    Chronic pain of right knee 05/23/2024     Priority: Medium    Obstructive sleep apnea 05/13/2024     Priority: Medium    Post-traumatic osteoarthritis of knee, unspecified laterality 01/04/2024     Priority: Medium     S/p right TKA in 7/2024      Type 2 diabetes mellitus without complication, without long-term current use of insulin (H) 10/03/2023     Priority: Medium    Sacroiliac joint pain 07/25/2019     Priority: Medium    Benign essential hypertension 07/19/2019     Priority: Medium       Family History   Problem Relation Age of Onset    Diabetes Mother     Cerebrovascular Disease Mother     Hypertension Mother     Cerebrovascular Disease Father     Hypertension Father     Diabetes Father     Diabetes Sister     Hypertension Sister     Hyperlipidemia Sister     Obesity Sister     Diabetes Sister     Hypertension Sister     Cerebrovascular Disease Brother     Other Cancer Other        Past Surgical History:   Procedure Laterality Date    NO PAST SURGERIES          Social History     Socioeconomic History    Marital status:      Spouse name: Not on file    Number of children: Not on file    Years of education: Not on file    Highest education level: Not on file   Occupational History    Not on file   Tobacco Use    Smoking status: Former     Current packs/day: 0.00     Average packs/day: 0.5 packs/day for 25.6 years (12.8 ttl pk-yrs)     Types: Cigarettes     Start date: 1/1/1996     Quit date: 8/4/2021     Years since quitting: 3.6     Passive exposure: Never    Smokeless tobacco: Former   Vaping Use    Vaping status: Never Used   Substance and Sexual Activity    Alcohol use: Not Currently     Comment: Alcoholic Drinks/day: occasional    Drug use: Never    Sexual activity: Yes     Partners: Female      Birth control/protection: None   Other Topics Concern    Parent/sibling w/ CABG, MI or angioplasty before 65F 55M? No   Social History Narrative    Not on file     Social Drivers of Health     Financial Resource Strain: Low Risk  (1/17/2025)    Financial Resource Strain     Within the past 12 months, have you or your family members you live with been unable to get utilities (heat, electricity) when it was really needed?: No   Food Insecurity: Low Risk  (1/17/2025)    Food Insecurity     Within the past 12 months, did you worry that your food would run out before you got money to buy more?: No     Within the past 12 months, did the food you bought just not last and you didn t have money to get more?: No   Transportation Needs: Low Risk  (1/17/2025)    Transportation Needs     Within the past 12 months, has lack of transportation kept you from medical appointments, getting your medicines, non-medical meetings or appointments, work, or from getting things that you need?: No   Physical Activity: Insufficiently Active (1/17/2025)    Exercise Vital Sign     Days of Exercise per Week: 1 day     Minutes of Exercise per Session: 30 min   Stress: No Stress Concern Present (1/17/2025)    [employer] [place] [employer] - Occupational Stress Questionnaire     Feeling of Stress : Only a little   Social Connections: Unknown (1/17/2025)    Social Connection and Isolation Panel [NHANES]     Frequency of Communication with Friends and Family: Not on file     Frequency of Social Gatherings with Friends and Family: Once a week     Attends Yarsanism Services: Not on file     Active Member of Clubs or Organizations: Not on file     Attends Club or Organization Meetings: Not on file     Marital Status: Not on file   Interpersonal Safety: Low Risk  (1/22/2025)    Interpersonal Safety     Do you feel physically and emotionally safe where you currently live?: Yes     Within the past 12 months, have you been hit, slapped, kicked or  otherwise physically hurt by someone?: No     Within the past 12 months, have you been humiliated or emotionally abused in other ways by your partner or ex-partner?: No   Housing Stability: Low Risk  (1/17/2025)    Housing Stability     Do you have housing? : Yes     Are you worried about losing your housing?: No

## 2025-04-01 NOTE — TELEPHONE ENCOUNTER
General Call    Contacts       Contact Date/Time Type Contact Phone/Fax    04/01/2025 01:45 PM CDT Phone (Incoming) [id number] - [place] [id number] [facility]  [facility]acy[facility] [phone]          Reason for Call:  Lidocaine HCl 5 % GEL  is not cover by pt's insurance. However, insurance does cover Lidocaine HCL 4% cream if provider like to chnage to this one. Please re-send Rx to pharmacy.    Date of last appointment with provider: 4/1/2025

## 2025-04-24 DIAGNOSIS — J45.20 MILD INTERMITTENT ASTHMA WITHOUT COMPLICATION: ICD-10-CM

## 2025-04-24 RX ORDER — BUDESONIDE AND FORMOTEROL FUMARATE DIHYDRATE 160; 4.5 UG/1; UG/1
AEROSOL RESPIRATORY (INHALATION)
Qty: 10.2 G | Refills: 3 | Status: SHIPPED | OUTPATIENT
Start: 2025-04-24

## 2025-04-26 ENCOUNTER — HEALTH MAINTENANCE LETTER (OUTPATIENT)
Age: 50
End: 2025-04-26

## 2025-04-30 DIAGNOSIS — E78.1 HYPERTRIGLYCERIDEMIA: ICD-10-CM

## 2025-04-30 RX ORDER — PRAVASTATIN SODIUM 40 MG
40 TABLET ORAL DAILY
Qty: 90 TABLET | Refills: 1 | Status: SHIPPED | OUTPATIENT
Start: 2025-04-30

## 2025-06-02 ENCOUNTER — MYC REFILL (OUTPATIENT)
Dept: FAMILY MEDICINE | Facility: CLINIC | Age: 50
End: 2025-06-02
Payer: COMMERCIAL

## 2025-06-02 DIAGNOSIS — B35.1 ONYCHOMYCOSIS: ICD-10-CM

## 2025-06-02 DIAGNOSIS — L60.0 INGROWING TOENAIL: ICD-10-CM

## 2025-06-03 RX ORDER — TERBINAFINE HYDROCHLORIDE 250 MG/1
250 TABLET ORAL DAILY
Qty: 90 TABLET | Refills: 0 | Status: SHIPPED | OUTPATIENT
Start: 2025-06-03

## 2025-06-07 DIAGNOSIS — I10 BENIGN ESSENTIAL HYPERTENSION: ICD-10-CM

## 2025-06-07 RX ORDER — LOSARTAN POTASSIUM 50 MG/1
75 TABLET ORAL DAILY
Qty: 135 TABLET | Refills: 3 | OUTPATIENT
Start: 2025-06-07

## 2025-06-10 DIAGNOSIS — E11.9 TYPE 2 DIABETES MELLITUS WITHOUT COMPLICATION, WITHOUT LONG-TERM CURRENT USE OF INSULIN (H): Primary | ICD-10-CM

## 2025-06-23 ENCOUNTER — TELEPHONE (OUTPATIENT)
Dept: FAMILY MEDICINE | Facility: CLINIC | Age: 50
End: 2025-06-23
Payer: COMMERCIAL

## 2025-06-23 NOTE — TELEPHONE ENCOUNTER
Patient Quality Outreach    Patient is due for the following:   Diabetes -  A1C and Eye Exam  Asthma  -  AAP  Physical Preventive Adult Physical      Topic Date Due    Hepatitis B Vaccine (2 of 3 - 19+ 3-dose series) 10/31/2023    Zoster (Shingles) Vaccine (1 of 2) 02/16/2025     BMP  Annual Review of  Orders    Action(s) Taken:   Patient has upcoming appointment, these items will be addressed at that time.    Type of outreach:    Chart review performed, no outreach needed.    Questions for provider review:    None         Regino Morrell MA  Chart routed to None.

## 2025-06-23 NOTE — TELEPHONE ENCOUNTER
Patient Quality Outreach    Patient is due for the following:   Diabetes -  A1C and Eye Exam  Asthma  -  AAP  Physical Preventive Adult Physical    Action(s) Taken:   Schedule a Adult Preventative. He has an upcoming appt so can address it at that time too.    Type of outreach:    Sent [employer] message.    Questions for provider review:    None         Isaura Parker, [facility]  Chart routed to None.

## 2025-07-17 ENCOUNTER — PATIENT OUTREACH (OUTPATIENT)
Dept: CARE COORDINATION | Facility: CLINIC | Age: 50
End: 2025-07-17
Payer: COMMERCIAL

## 2025-07-27 ENCOUNTER — NURSE TRIAGE (OUTPATIENT)
Dept: FAMILY MEDICINE | Facility: CLINIC | Age: 50
End: 2025-07-27
Payer: COMMERCIAL

## 2025-07-28 NOTE — TELEPHONE ENCOUNTER
"Nurse Triage SBAR    Is this a 2nd Level Triage? NO    Situation: ingrown toenails    Background:     Ongoing for 2 weeks now     Assessment:   - pain is at 10/10 on right big toe  - there is some redness around the toe  - no other symptoms    Protocol Recommended Disposition:   See in Office Within 3 Days    Recommendation:  offered appt but patient refused. He stated his wife is calling around to see if someone could cut it for him today. Writer also recommended urgent care if they are unable to get in today. He verbalized understanding.       Sadiq Bird, BSN RN, PHN  [facility]      Reason for Disposition   MODERATE pain (e.g., limping, interferes with normal activities) and present > 3 days    Additional Information   Negative: Doesn't match the SYMPTOMS for ingrown toenail   Negative: Patient sounds very sick or weak to the triager   Negative: Looks infected (spreading redness, red streak, pus) and fever   Negative: Red streaking and longer than 1 inch (2.5 cm)   Negative: Skin around the nail has become red and larger than 2 inches (5 cm)   Negative: Entire toe is red or swollen   Negative: Yellow pus seen in skin around toenail (cuticle area), or pus seen under toenail    Answer Assessment - Initial Assessment Questions  1. LOCATION: \"Which toe?\"       Right big toe    2. APPEARANCE: \"What does it look like?\"       Redness on the right side of toe    3. ONSET: \"When did it start?\"       2 weeks ago    4. PAIN: \"Is there any pain?\" If Yes, ask: \"How bad is the pain?\"   (Scale 1-10; or mild, moderate, severe)      10/10    5. REDNESS: \"Is there any redness of the skin?\" If Yes, ask: \"How much of the toe is red?\"      Yes     6. OTHER SYMPTOMS: \"Do you have any other symptoms?\" (e.g., chills, fever, red streak up foot)      No    7. PREGNANCY: \"Is there any chance you are pregnant?\" \"When was your last menstrual period?\"      no    Protocols used: Toenail - Ingrown-A-OH    "

## 2025-08-06 ENCOUNTER — OFFICE VISIT (OUTPATIENT)
Dept: PODIATRY | Facility: CLINIC | Age: 50
End: 2025-08-06
Payer: COMMERCIAL

## 2025-08-06 VITALS — WEIGHT: 235 LBS | HEIGHT: 69 IN | BODY MASS INDEX: 34.8 KG/M2

## 2025-08-06 DIAGNOSIS — L60.0 INGROWING NAIL: Primary | ICD-10-CM

## 2025-08-06 RX ORDER — LIDOCAINE HYDROCHLORIDE 20 MG/ML
6 INJECTION, SOLUTION INFILTRATION; PERINEURAL ONCE
Status: COMPLETED | OUTPATIENT
Start: 2025-08-06 | End: 2025-08-06

## 2025-08-06 RX ADMIN — LIDOCAINE HYDROCHLORIDE 6 ML: 20 INJECTION, SOLUTION INFILTRATION; PERINEURAL at 11:07

## 2025-08-09 ENCOUNTER — HEALTH MAINTENANCE LETTER (OUTPATIENT)
Age: 50
End: 2025-08-09